# Patient Record
Sex: FEMALE | Race: WHITE | NOT HISPANIC OR LATINO | Employment: FULL TIME | URBAN - METROPOLITAN AREA
[De-identification: names, ages, dates, MRNs, and addresses within clinical notes are randomized per-mention and may not be internally consistent; named-entity substitution may affect disease eponyms.]

---

## 2017-01-17 ENCOUNTER — ALLSCRIPTS OFFICE VISIT (OUTPATIENT)
Dept: OTHER | Facility: OTHER | Age: 47
End: 2017-01-17

## 2017-01-17 ENCOUNTER — GENERIC CONVERSION - ENCOUNTER (OUTPATIENT)
Dept: OTHER | Facility: OTHER | Age: 47
End: 2017-01-17

## 2017-08-12 ENCOUNTER — ALLSCRIPTS OFFICE VISIT (OUTPATIENT)
Dept: OTHER | Facility: OTHER | Age: 47
End: 2017-08-12

## 2018-01-13 VITALS
DIASTOLIC BLOOD PRESSURE: 70 MMHG | HEIGHT: 66 IN | BODY MASS INDEX: 30.53 KG/M2 | SYSTOLIC BLOOD PRESSURE: 122 MMHG | HEART RATE: 88 BPM | WEIGHT: 190 LBS | OXYGEN SATURATION: 97 %

## 2018-01-13 VITALS
HEIGHT: 66 IN | BODY MASS INDEX: 30.86 KG/M2 | WEIGHT: 192 LBS | TEMPERATURE: 97.9 F | DIASTOLIC BLOOD PRESSURE: 60 MMHG | HEART RATE: 80 BPM | RESPIRATION RATE: 18 BRPM | SYSTOLIC BLOOD PRESSURE: 100 MMHG

## 2018-03-08 ENCOUNTER — OFFICE VISIT (OUTPATIENT)
Dept: FAMILY MEDICINE CLINIC | Facility: CLINIC | Age: 48
End: 2018-03-08
Payer: COMMERCIAL

## 2018-03-08 VITALS
SYSTOLIC BLOOD PRESSURE: 110 MMHG | HEART RATE: 90 BPM | RESPIRATION RATE: 16 BRPM | WEIGHT: 201 LBS | TEMPERATURE: 98.2 F | BODY MASS INDEX: 32.3 KG/M2 | OXYGEN SATURATION: 96 % | DIASTOLIC BLOOD PRESSURE: 60 MMHG | HEIGHT: 66 IN

## 2018-03-08 DIAGNOSIS — F41.9 ANXIETY: ICD-10-CM

## 2018-03-08 DIAGNOSIS — B37.3 CANDIDA VAGINITIS: ICD-10-CM

## 2018-03-08 DIAGNOSIS — J06.9 UPPER RESPIRATORY TRACT INFECTION, UNSPECIFIED TYPE: ICD-10-CM

## 2018-03-08 DIAGNOSIS — R05.3 COUGH, PERSISTENT: Primary | ICD-10-CM

## 2018-03-08 DIAGNOSIS — E66.09 CLASS 1 OBESITY DUE TO EXCESS CALORIES WITHOUT SERIOUS COMORBIDITY WITH BODY MASS INDEX (BMI) OF 32.0 TO 32.9 IN ADULT: ICD-10-CM

## 2018-03-08 PROCEDURE — 3008F BODY MASS INDEX DOCD: CPT | Performed by: FAMILY MEDICINE

## 2018-03-08 PROCEDURE — 99214 OFFICE O/P EST MOD 30 MIN: CPT | Performed by: FAMILY MEDICINE

## 2018-03-08 RX ORDER — BENZONATATE 100 MG/1
CAPSULE ORAL
COMMUNITY
Start: 2018-03-05 | End: 2018-03-08 | Stop reason: SDUPTHER

## 2018-03-08 RX ORDER — PHENTERMINE HYDROCHLORIDE 37.5 MG/1
37.5 CAPSULE ORAL EVERY MORNING
Qty: 30 CAPSULE | Refills: 2 | Status: SHIPPED | OUTPATIENT
Start: 2018-03-08 | End: 2020-12-08 | Stop reason: SDUPTHER

## 2018-03-08 RX ORDER — BENZONATATE 200 MG/1
200 CAPSULE ORAL 3 TIMES DAILY PRN
Qty: 45 CAPSULE | Refills: 0 | Status: SHIPPED | OUTPATIENT
Start: 2018-03-08 | End: 2019-02-05 | Stop reason: SDUPTHER

## 2018-03-08 RX ORDER — AMOXICILLIN AND CLAVULANATE POTASSIUM 875; 125 MG/1; MG/1
TABLET, FILM COATED ORAL
COMMUNITY
Start: 2018-03-05 | End: 2018-11-14 | Stop reason: SDUPTHER

## 2018-03-08 RX ORDER — ALPRAZOLAM 0.5 MG/1
0.5 TABLET ORAL 3 TIMES DAILY PRN
Qty: 30 TABLET | Refills: 0 | Status: SHIPPED | OUTPATIENT
Start: 2018-03-08 | End: 2019-02-05 | Stop reason: SDUPTHER

## 2018-03-08 RX ORDER — ALPRAZOLAM 0.5 MG/1
1 TABLET ORAL EVERY 6 HOURS
COMMUNITY
Start: 2016-02-04 | End: 2018-03-08 | Stop reason: SDUPTHER

## 2018-03-08 RX ORDER — FLUCONAZOLE 150 MG/1
TABLET ORAL
Qty: 2 TABLET | Refills: 0 | Status: SHIPPED | OUTPATIENT
Start: 2018-03-08 | End: 2018-03-15

## 2018-03-08 NOTE — PROGRESS NOTES
[address]        NAME: Scottie Sandhu is a 41 y o  female  : 1970    MRN: [id number]  DATE: 2018  TIME: 4:57 PM    Assessment and Plan   Cough, persistent [R05]  1  Cough, persistent  HYDROcodone-homatropine (HYDROMET) 5-1 5 mg/5 mL syrup    benzonatate (TESSALON) 200 MG capsule   2  Upper respiratory tract infection, unspecified type     3  Anxiety  ALPRAZolam (XANAX) 0 5 mg tablet   4  Class 1 obesity due to excess calories without serious comorbidity with body mass index (BMI) of 32 0 to 32 9 in adult  phentermine 37 5 MG capsule   5  Candida vaginitis  fluconazole (DIFLUCAN) 150 mg tablet         Patient Instructions     Patient Instructions     Ian Persaud out her Augmentin  Use the Tessalon during the day for the cough in use the hydrocodone cough syrup at night  Refill on Xanax to be use 2-3 times per day maximum as needed  Diflucan once weekly for vaginal yeast infection secondary to antibiotic  Trial of phentermine for weight loss via appetite suppressant  We should check it back about 2 months in to starting this  Chief Complaint     Chief Complaint   Patient presents with    Cough     post nasal drip, wants rx for cough medicine    History of Present Illness       Patient has been know of last 4-5 days  She called the [facility] and received a prescription for Augmentin  She had not had any fevers or chills  She has minimal sinus pressure with some postnasal drip  There been no night sweats or documented fevers  Cough currently has a very dry cough  It does disturb her sleep  Pt does note starting with vaginal itching since on Augmentin  Anxiety- uses Xanax prn- 1-3/week   Patient also interested in trying phentermine to help with appetite so she may lose some weight  Current BMI 32  Cough   Associated symptoms include rhinorrhea   Pertinent negatives include no chest pain, chills, ear pain, eye redness, fever, sore throat, shortness of breath or wheezing  Review of Systems   Review of Systems   Constitutional: Negative for appetite change, chills, diaphoresis and fever  HENT: Positive for rhinorrhea  Negative for ear pain, sinus pressure and sore throat  Eyes: Negative for discharge, redness and itching  Respiratory: Positive for cough  Negative for shortness of breath and wheezing  Cardiovascular: Negative for chest pain and palpitations  Rapid or slow heart rate   Gastrointestinal: Negative for abdominal pain, diarrhea, nausea and vomiting  Genitourinary: Positive for vaginal discharge  Negative for difficulty urinating and dysuria  Psychiatric/Behavioral: Positive for dysphoric mood and sleep disturbance  Negative for decreased concentration  The patient is nervous/anxious            Current Medications       Current Outpatient Prescriptions:     ALPRAZolam (XANAX) 0 5 mg tablet, Take 1 tablet (0 5 mg total) by mouth 3 (three) times a day as needed for anxiety, Disp: 30 tablet, Rfl: 0    HYDROcodone-homatropine (HYDROMET) 5-1 5 mg/5 mL syrup, Take 5 mL by mouth every 4 (four) hours as needed for cough Max Daily Amount: 30 mL, Disp: 473 mL, Rfl: 0    amoxicillin-clavulanate (AUGMENTIN) 875-125 mg per tablet, , Disp: , Rfl:     benzonatate (TESSALON) 200 MG capsule, Take 1 capsule (200 mg total) by mouth 3 (three) times a day as needed for cough, Disp: 45 capsule, Rfl: 0    fluconazole (DIFLUCAN) 150 mg tablet, 1 tab for vaginal itching, repeat 1 week if needed, Disp: 2 tablet, Rfl: 0    phentermine 37 5 MG capsule, Take 1 capsule (37 5 mg total) by mouth every morning, Disp: 30 capsule, Rfl: 2    Current Allergies     Allergies as of 03/08/2018 - Reviewed 03/08/2018   Allergen Reaction Noted    Doxycycline GI Intolerance and Vomiting 01/17/2017    Latex  05/09/2016            The following portions of the patient's history were reviewed and updated as appropriate: allergies, current medications, past family history, past medical history, past social history, past surgical history and problem list      No past medical history on file  No past surgical history on file  Family History   Problem Relation Age of Onset    Hypertension Father     Breast cancer Paternal Grandmother     Diabetes Paternal Grandmother     Substance Abuse Neg Hx     Mental illness Neg Hx          Medications have been verified  Objective   /60 (BP Location: Right arm, Patient Position: Sitting, Cuff Size: Large)   Pulse 90   Temp 98 2 °F (36 8 °C) (Oral)   Resp 16   Ht 5' 5 5" (1 664 m)   Wt 91 2 kg (201 lb)   SpO2 96%   BMI 32 94 kg/m²        Physical Exam     Physical Exam   Constitutional: She appears well-developed and well-nourished  No distress  HENT:   Head: Normocephalic and atraumatic  Right Ear: Tympanic membrane and external ear normal  No drainage  Left Ear: Tympanic membrane normal  There is drainage  Mouth/Throat: No oropharyngeal exudate  Eyes: Conjunctivae and EOM are normal  Right eye exhibits no discharge  Left eye exhibits no discharge  Neck: Normal range of motion  Neck supple  No thyromegaly present  Cardiovascular: Normal rate, regular rhythm and normal heart sounds  Pulmonary/Chest: Effort normal  No respiratory distress  She has no wheezes  She has no rales  Abdominal: Soft  She exhibits no mass  There is no tenderness  Lymphadenopathy:     She has no cervical adenopathy  Skin: Skin is warm and dry  Psychiatric: She has a normal mood and affect   Her behavior is normal

## 2018-03-08 NOTE — PATIENT INSTRUCTIONS
Finish out her Augmentin  Use the Tessalon during the day for the cough in use the hydrocodone cough syrup at night  Refill on Xanax to be use 2-3 times per day maximum as needed  Diflucan once weekly for vaginal yeast infection secondary to antibiotic  Trial of phentermine for weight loss via appetite suppressant  We should check it back about 2 months in to starting this

## 2018-04-02 ENCOUNTER — TELEPHONE (OUTPATIENT)
Dept: FAMILY MEDICINE CLINIC | Facility: CLINIC | Age: 48
End: 2018-04-02

## 2018-04-02 DIAGNOSIS — R05.3 COUGH, PERSISTENT: ICD-10-CM

## 2018-11-14 ENCOUNTER — OFFICE VISIT (OUTPATIENT)
Dept: FAMILY MEDICINE CLINIC | Facility: CLINIC | Age: 48
End: 2018-11-14
Payer: COMMERCIAL

## 2018-11-14 ENCOUNTER — TELEPHONE (OUTPATIENT)
Dept: FAMILY MEDICINE CLINIC | Facility: CLINIC | Age: 48
End: 2018-11-14

## 2018-11-14 VITALS
DIASTOLIC BLOOD PRESSURE: 76 MMHG | HEIGHT: 66 IN | WEIGHT: 187 LBS | BODY MASS INDEX: 30.05 KG/M2 | SYSTOLIC BLOOD PRESSURE: 118 MMHG | HEART RATE: 85 BPM | OXYGEN SATURATION: 98 %

## 2018-11-14 DIAGNOSIS — H66.90 ACUTE OTITIS MEDIA, UNSPECIFIED OTITIS MEDIA TYPE: Primary | ICD-10-CM

## 2018-11-14 PROCEDURE — 99213 OFFICE O/P EST LOW 20 MIN: CPT | Performed by: FAMILY MEDICINE

## 2018-11-14 PROCEDURE — 3008F BODY MASS INDEX DOCD: CPT | Performed by: FAMILY MEDICINE

## 2018-11-14 PROCEDURE — 1036F TOBACCO NON-USER: CPT | Performed by: FAMILY MEDICINE

## 2018-11-14 RX ORDER — AMOXICILLIN AND CLAVULANATE POTASSIUM 875; 125 MG/1; MG/1
1 TABLET, FILM COATED ORAL EVERY 12 HOURS SCHEDULED
Qty: 20 TABLET | Refills: 0 | Status: SHIPPED | OUTPATIENT
Start: 2018-11-14 | End: 2018-11-24

## 2018-11-14 NOTE — PROGRESS NOTES
[facility]        NAME: Trevor Lam is a 57 y o  female  : 1970    MRN: [id number]  DATE: 2018  TIME: 2:44 PM    Assessment and Plan   Acute otitis media, unspecified otitis media type [H66 90]  1  Acute otitis media, unspecified otitis media type  amoxicillin-clavulanate (AUGMENTIN) 875-125 mg per tablet         Patient Instructions     Patient Instructions   See meds---flush R ear if Sx cont          Chief Complaint     Chief Complaint   Patient presents with    Earache     sorethroat          History of Present Illness       R ear pain after flying      Earache    Pertinent negatives include no abdominal pain, coughing, diarrhea, rhinorrhea, sore throat or vomiting  Review of Systems   Review of Systems   Constitutional: Negative for appetite change, chills, diaphoresis and fever  HENT: Positive for ear pain  Negative for rhinorrhea, sinus pressure and sore throat  Eyes: Negative for discharge, redness and itching  Respiratory: Negative for cough, shortness of breath and wheezing  Cardiovascular: Negative for chest pain and palpitations  Gastrointestinal: Negative for abdominal pain, diarrhea, nausea and vomiting           Current Medications       Current Outpatient Prescriptions:     ALPRAZolam (XANAX) 0 5 mg tablet, Take 1 tablet (0 5 mg total) by mouth 3 (three) times a day as needed for anxiety, Disp: 30 tablet, Rfl: 0    amoxicillin-clavulanate (AUGMENTIN) 875-125 mg per tablet, Take 1 tablet by mouth every 12 (twelve) hours for 10 days, Disp: 20 tablet, Rfl: 0    benzonatate (TESSALON) 200 MG capsule, Take 1 capsule (200 mg total) by mouth 3 (three) times a day as needed for cough (Patient not taking: Reported on 2018 ), Disp: 45 capsule, Rfl: 0    HYDROcodone-homatropine (HYDROMET) 5-1 5 mg/5 mL syrup, Take 5 mL by mouth every 4 (four) hours as needed for cough Max Daily Amount: 30 mL (Patient not taking: Reported on 2018 ), Disp: 473 mL, Rfl: 0    phentermine 37 5 MG capsule, Take 1 capsule (37 5 mg total) by mouth every morning (Patient not taking: Reported on 11/14/2018 ), Disp: 30 capsule, Rfl: 2    Current Allergies     Allergies as of 11/14/2018 - Reviewed 11/14/2018   Allergen Reaction Noted    Doxycycline GI Intolerance and Vomiting 01/17/2017    Latex  05/09/2016            The following portions of the patient's history were reviewed and updated as appropriate: allergies, current medications, past family history, past medical history, past social history, past surgical history and problem list      History reviewed  No pertinent past medical history  History reviewed  No pertinent surgical history  Family History   Problem Relation Age of Onset    Hypertension Father     Breast cancer Paternal Grandmother     Diabetes Paternal Grandmother     Substance Abuse Neg Hx     Mental illness Neg Hx          Medications have been verified  Objective   /76   Pulse 85   Ht 5' 5 5" (1 664 m)   Wt 84 8 kg (187 lb)   SpO2 98%   BMI 30 65 kg/m²        Physical Exam     Physical Exam   Constitutional: She appears well-developed and well-nourished  No distress  HENT:   Right Ear: Tympanic membrane and ear canal normal  Tympanic membrane is not injected  Left Ear: Tympanic membrane and ear canal normal  Tympanic membrane is not injected  Nose: Nose normal    Mouth/Throat: Oropharynx is clear and moist and mucous membranes are normal    R ear full of cerumen   Eyes: Pupils are equal, round, and reactive to light  Conjunctivae and EOM are normal  Right eye exhibits no discharge  Left eye exhibits no discharge  Neck: Normal range of motion  Neck supple  No thyromegaly present  Cardiovascular: Normal rate, regular rhythm and normal heart sounds  No murmur heard  Pulmonary/Chest: Effort normal and breath sounds normal  No respiratory distress  She has no wheezes     Lymphadenopathy:     She has no cervical adenopathy  Skin: She is not diaphoretic  Nursing note and vitals reviewed

## 2018-11-14 NOTE — TELEPHONE ENCOUNTER
Sore throat, ear pain crackling and clogged, - just got back from [facility] her an appointment for tomorrow but pt wants to be seen today since she is off work today    Do you want to see patient today or should I send to urgent care    [phone]

## 2018-11-19 DIAGNOSIS — B37.9 YEAST INFECTION: Primary | ICD-10-CM

## 2018-11-19 RX ORDER — FLUCONAZOLE 150 MG/1
TABLET ORAL
Qty: 1 TABLET | Refills: 1 | Status: SHIPPED | OUTPATIENT
Start: 2018-11-19 | End: 2018-11-19

## 2018-11-28 DIAGNOSIS — R05.3 COUGH, PERSISTENT: ICD-10-CM

## 2018-11-28 NOTE — TELEPHONE ENCOUNTER
Spoke to patient, states she saw Dr Nico Saab 2 weeks ago ear ache, sore throat, cough    Was given antibiotic Rx and now cough has worsened  Would like same cough syrup that she had back in the spring from Dr Blade Hewitt      Rx written and sent to Dr Nico Saab to sign

## 2019-02-05 ENCOUNTER — TELEPHONE (OUTPATIENT)
Dept: FAMILY MEDICINE CLINIC | Facility: CLINIC | Age: 49
End: 2019-02-05

## 2019-02-05 ENCOUNTER — OFFICE VISIT (OUTPATIENT)
Dept: FAMILY MEDICINE CLINIC | Facility: CLINIC | Age: 49
End: 2019-02-05
Payer: COMMERCIAL

## 2019-02-05 VITALS
TEMPERATURE: 98.3 F | BODY MASS INDEX: 29.25 KG/M2 | HEIGHT: 66 IN | OXYGEN SATURATION: 98 % | DIASTOLIC BLOOD PRESSURE: 70 MMHG | WEIGHT: 182 LBS | HEART RATE: 88 BPM | RESPIRATION RATE: 16 BRPM | SYSTOLIC BLOOD PRESSURE: 100 MMHG

## 2019-02-05 DIAGNOSIS — F41.9 ANXIETY: ICD-10-CM

## 2019-02-05 DIAGNOSIS — G89.29 CHRONIC BILATERAL LOW BACK PAIN WITHOUT SCIATICA: ICD-10-CM

## 2019-02-05 DIAGNOSIS — R05.3 COUGH, PERSISTENT: ICD-10-CM

## 2019-02-05 DIAGNOSIS — R05.8 POST-VIRAL COUGH SYNDROME: ICD-10-CM

## 2019-02-05 DIAGNOSIS — M54.50 CHRONIC BILATERAL LOW BACK PAIN WITHOUT SCIATICA: ICD-10-CM

## 2019-02-05 DIAGNOSIS — E66.3 OVERWEIGHT (BMI 25.0-29.9): ICD-10-CM

## 2019-02-05 DIAGNOSIS — Z23 NEED FOR VACCINATION: Primary | ICD-10-CM

## 2019-02-05 PROCEDURE — 1036F TOBACCO NON-USER: CPT | Performed by: FAMILY MEDICINE

## 2019-02-05 PROCEDURE — 99214 OFFICE O/P EST MOD 30 MIN: CPT | Performed by: FAMILY MEDICINE

## 2019-02-05 PROCEDURE — 3008F BODY MASS INDEX DOCD: CPT | Performed by: FAMILY MEDICINE

## 2019-02-05 RX ORDER — METAXALONE 800 MG/1
800 TABLET ORAL 3 TIMES DAILY
Qty: 60 TABLET | Refills: 2 | Status: SHIPPED | OUTPATIENT
Start: 2019-02-05 | End: 2020-12-08 | Stop reason: SDUPTHER

## 2019-02-05 RX ORDER — ALPRAZOLAM 0.5 MG/1
0.5 TABLET ORAL 3 TIMES DAILY PRN
Qty: 30 TABLET | Refills: 2 | Status: SHIPPED | OUTPATIENT
Start: 2019-02-05 | End: 2020-02-24 | Stop reason: SDUPTHER

## 2019-02-05 RX ORDER — ALBUTEROL SULFATE 90 UG/1
2 AEROSOL, METERED RESPIRATORY (INHALATION) EVERY 6 HOURS PRN
Qty: 8.5 G | Refills: 1 | Status: SHIPPED | OUTPATIENT
Start: 2019-02-05 | End: 2022-08-03

## 2019-02-05 RX ORDER — PREDNISONE 20 MG/1
TABLET ORAL
Qty: 15 TABLET | Refills: 0 | Status: SHIPPED | OUTPATIENT
Start: 2019-02-05 | End: 2022-08-03

## 2019-02-05 RX ORDER — BENZONATATE 200 MG/1
200 CAPSULE ORAL 3 TIMES DAILY PRN
Qty: 45 CAPSULE | Refills: 0 | Status: SHIPPED | OUTPATIENT
Start: 2019-02-05 | End: 2020-02-24 | Stop reason: SDUPTHER

## 2019-02-05 NOTE — TELEPHONE ENCOUNTER
Patients states has had this cough since November, was seen 11/14/2018 for ear problems was given abx, then called 2weeks later with cough was given cough med--pt still c/o of this cough  Wants to know if she can get more cough meds?

## 2019-02-05 NOTE — PATIENT INSTRUCTIONS
Trial of short course of prednisone to help with the cough  Will refill the cough syrup  If this does not correct the problem I would advise a chest x-ray and a trial of a bronchodilator inhaler  Refill other medications for low back pain on an intermittent basis  Also for the anxiety with as needed Xanax usually only 1-2 times per week  Weight Management   AMBULATORY CARE:   Why it is important to manage your weight:  Being overweight increases your risk of health conditions such as heart disease, high blood pressure, type 2 diabetes, and certain types of cancer  It can also increase your risk for osteoarthritis, sleep apnea, and other respiratory problems  Aim for a slow, steady weight loss  Even a small amount of weight loss can lower your risk of health problems  How to lose weight safely:  A safe and healthy way to lose weight is to eat fewer calories and get regular exercise  You can lose up about 1 pound a week by decreasing the number of calories you eat by 500 calories each day  You can decrease calories by eating smaller portion sizes or by cutting out high-calorie foods  Read labels to find out how many calories are in the foods you eat  You can also burn calories with exercise such as walking, swimming, or biking  You will be more likely to keep weight off if you make these changes part of your lifestyle  Healthy meal plan for weight management:  A healthy meal plan includes a variety of foods, contains fewer calories, and helps you stay healthy  A healthy meal plan includes the following:  · Eat whole-grain foods more often  A healthy meal plan should contain fiber  Fiber is the part of grains, fruits, and vegetables that is not broken down by your body  Whole-grain foods are healthy and provide extra fiber in your diet  Some examples of whole-grain foods are whole-wheat breads and pastas, oatmeal, brown rice, and bulgur  · Eat a variety of vegetables every day    Include dark, leafy greens such as spinach, kale, theron greens, and mustard greens  Eat yellow and orange vegetables such as carrots, sweet potatoes, and winter squash  · Eat a variety of fruits every day  Choose fresh or canned fruit (canned in its own juice or light syrup) instead of juice  Fruit juice has very little or no fiber  · Eat low-fat dairy foods  Drink fat-free (skim) milk or 1% milk  Eat fat-free yogurt and low-fat cottage cheese  Try low-fat cheeses such as mozzarella and other reduced-fat cheeses  · Choose meat and other protein foods that are low in fat  Choose beans or other legumes such as split peas or lentils  Choose fish, skinless poultry (chicken or turkey), or lean cuts of red meat (beef or pork)  Before you cook meat or poultry, cut off any visible fat  · Use less fat and oil  Try baking foods instead of frying them  Add less fat, such as margarine, sour cream, regular salad dressing and mayonnaise to foods  Eat fewer high-fat foods  Some examples of high-fat foods include french fries, doughnuts, ice cream, and cakes  · Eat fewer sweets  Limit foods and drinks that are high in sugar  This includes candy, cookies, regular soda, and sweetened drinks  Ways to decrease calories:   · Eat smaller portions  ¨ Use a small plate with smaller servings  ¨ Do not eat second helpings  ¨ When you eat at a restaurant, ask for a box and place half of your meal in the box before you eat  ¨ Share an entrée with someone else  · Replace high-calorie snacks with healthy, low-calorie snacks  ¨ Choose fresh fruit, vegetables, fat-free rice cakes, or air-popped popcorn instead of potato chips, nuts, or chocolate  ¨ Choose water or calorie-free drinks instead of soda or sweetened drinks  · Eat regular meals  Skipping meals can lead to overeating later in the day   Eat a healthy snack in place of a meal if you do not have time to eat a regular meal      · Do not shop for groceries when you are hungry  You may be more likely to make unhealthy food choices  Take a grocery list of healthy foods and shop after you have eaten  Exercise:  Exercise at least 30 minutes per day on most days of the week  Some examples of exercise include walking, biking, dancing, and swimming  You can also fit in more physical activity by taking the stairs instead of the elevator or parking farther away from stores  Ask your healthcare provider about the best exercise plan for you  Other things to consider as you try to lose weight:   · Be aware of situations that may give you the urge to overeat, such as eating while watching television  Find ways to avoid these situations  For example, read a book, go for a walk, or do crafts  · Meet with a weight loss support group or friends who are also trying to lose weight  This may help you stay motivated to continue working on your weight loss goals  © 2017 [employer] [employer] Information is for End User's use only and may not be sold, redistributed or otherwise used for commercial purposes  All illustrations and images included in CareNotes® are the copyrighted property of [employer] , [employer]  or [employer]  The above information is an  only  It is not intended as medical advice for individual conditions or treatments  Talk to your doctor, nurse or pharmacist before following any medical regimen to see if it is safe and effective for you  Heart Healthy Diet   AMBULATORY CARE:   A heart healthy diet  is an eating plan low in total fat, unhealthy fats, and sodium (salt)  A heart healthy diet helps decrease your risk for heart disease and stroke  Limit the amount of fat you eat to 25% to 35% of your total daily calories  Limit sodium to less than 2,300 mg each day  Healthy fats:  Healthy fats can help improve cholesterol levels   The risk for heart disease is decreased when cholesterol levels are normal  Choose healthy fats, such as the following:  · Unsaturated fat  is found in foods such as soybean, canola, olive, corn, and safflower oils  It is also found in soft tub margarine that is made with liquid vegetable oil  · Omega-3 fat  is found in certain fish, such as salmon, tuna, and trout, and in walnuts and flaxseed  Unhealthy fats:  Unhealthy fats can cause unhealthy cholesterol levels in your blood and increase your risk of heart disease  Limit unhealthy fats, such as the following:  · Cholesterol  is found in animal foods, such as eggs and lobster, and in dairy products made from whole milk  Limit cholesterol to less than 300 milligrams (mg) each day  You may need to limit cholesterol to 200 mg each day if you have heart disease  · Saturated fat  is found in meats, such as whitley and hamburger  It is also found in chicken or turkey skin, whole milk, and butter  Limit saturated fat to less than 7% of your total daily calories  Limit saturated fat to less than 6% if you have heart disease or are at increased risk for it  · Trans fat  is found in packaged foods, such as potato chips and cookies  It is also in hard margarine, some fried foods, and shortening  Avoid trans fats as much as possible    Heart healthy foods and drinks to include:  Ask your dietitian or healthcare provider how many servings to have from each of the following food groups:  · Grains:      ¨ Whole-wheat breads, cereals, and pastas, and brown rice    ¨ Low-fat, low-sodium crackers and chips    · Vegetables:      ¨ Broccoli, green beans, green peas, and spinach    ¨ Collards, kale, and lima beans    ¨ Carrots, sweet potatoes, tomatoes, and peppers    ¨ Canned vegetables with no salt added    · Fruits:      ¨ Bananas, peaches, pears, and pineapple    ¨ Grapes, raisins, and dates    ¨ Oranges, tangerines, grapefruit, orange juice, and grapefruit juice    ¨ Apricots, mangoes, melons, and papaya    ¨ Raspberries and strawberries    ¨ Canned fruit with no added sugar    · Low-fat dairy products:      ¨ Nonfat (skim) milk, 1% milk, and low-fat almond, cashew, or soy milks fortified with calcium    ¨ Low-fat cheese, regular or frozen yogurt, and cottage cheese    · Meats and proteins , such as lean cuts of beef and pork (loin, leg, round), skinless chicken and turkey, legumes, soy products, egg whites, and nuts  Foods and drinks to limit or avoid:  Ask your dietitian or healthcare provider about these and other foods that are high in unhealthy fat, sodium, and sugar:  · Snack or packaged foods , such as frozen dinners, cookies, macaroni and cheese, and cereals with more than 300 mg of sodium per serving    · Canned or dry mixes  for cakes, soups, sauces, or gravies    · Vegetables with added sodium , such as instant potatoes, vegetables with added sauces, or regular canned vegetables    · Other foods high in sodium , such as ketchup, barbecue sauce, salad dressing, pickles, olives, soy sauce, and miso    · High-fat dairy foods  such as whole or 2% milk, cream cheese, or sour cream, and cheeses     · High-fat protein foods  such as high-fat cuts of beef (T-bone steaks, ribs), chicken or turkey with skin, and organ meats, such as liver    · Cured or smoked meats , such as hot dogs, whitley, and sausage    · Unhealthy fats and oils , such as butter, stick margarine, shortening, and cooking oils such as coconut or palm oil    · Food and drinks high in sugar , such as soft drinks (soda), sports drinks, sweetened tea, candy, cake, cookies, pies, and doughnuts  Other diet guidelines to follow:   · Eat more foods containing omega-3 fats  Eat fish high in omega-3 fats at least 2 times a week  · Limit alcohol  Too much alcohol can damage your heart and raise your blood pressure  Women should limit alcohol to 1 drink a day  Men should limit alcohol to 2 drinks a day  A drink of alcohol is 12 ounces of beer, 5 ounces of wine, or 1½ ounces of liquor  · Choose low-sodium foods  High-sodium foods can lead to high blood pressure  Add little or no salt to food you prepare  Use herbs and spices in place of salt  · Eat more fiber  to help lower cholesterol levels  Eat at least 5 servings of fruits and vegetables each day  Eat 3 ounces of whole-grain foods each day  Legumes (beans) are also a good source of fiber  Lifestyle guidelines:   · Do not smoke  Nicotine and other chemicals in cigarettes and cigars can cause lung and heart damage  Ask your healthcare provider for information if you currently smoke and need help to quit  E-cigarettes or smokeless tobacco still contain nicotine  Talk to your healthcare provider before you use these products  · Exercise regularly  to help you maintain a healthy weight and improve your blood pressure and cholesterol levels  Ask your healthcare provider about the best exercise plan for you  Do not start an exercise program without asking your healthcare provider  Follow up with your healthcare provider as directed:  Write down your questions so you remember to ask them during your visits  © 2017 [address] [facility] Information is for End User's use only and may not be sold, redistributed or otherwise used for commercial purposes  All illustrations and images included in CareNotes® are the copyrighted property of [employer] , [employer]  or [employer]  The above information is an  only  It is not intended as medical advice for individual conditions or treatments  Talk to your doctor, nurse or pharmacist before following any medical regimen to see if it is safe and effective for you

## 2019-02-05 NOTE — PROGRESS NOTES
[id number]         NAME: Larissa Stewart is a 57 y o  female  : 1970    MRN: [id number]  DATE: 2019  TIME: 5:35 PM    Assessment and Plan   Need for vaccination [Z23]  1  Need for vaccination  PREFERRED: influenza vaccine, 8627-2329, quadrivalent, recombinant, PF, 0 5 mL (FLUBLOK)   2  Post-viral cough syndrome  predniSONE 20 mg tablet    XR chest pa & lateral    albuterol (PROAIR HFA) 90 mcg/act inhaler   3  Anxiety  ALPRAZolam (XANAX) 0 5 mg tablet   4  Chronic bilateral low back pain without sciatica  metaxalone (SKELAXIN) 800 mg tablet   5  Overweight (BMI 25 0-29 9)     6  Cough, persistent  benzonatate (TESSALON) 200 MG capsule    HYDROcodone-homatropine (HYDROMET) 5-1 5 mg/5 mL syrup       No problem-specific Assessment & Plan notes found for this encounter  Patient Instructions     Patient Instructions   Trial of short course of prednisone to help with the cough  Will refill the cough syrup  If this does not correct the problem I would advise a chest x-ray and a trial of a bronchodilator inhaler  Refill other medications for low back pain on an intermittent basis  Also for the anxiety with as needed Xanax usually only 1-2 times per week  Weight Management   AMBULATORY CARE:   Why it is important to manage your weight:  Being overweight increases your risk of health conditions such as heart disease, high blood pressure, type 2 diabetes, and certain types of cancer  It can also increase your risk for osteoarthritis, sleep apnea, and other respiratory problems  Aim for a slow, steady weight loss  Even a small amount of weight loss can lower your risk of health problems  How to lose weight safely:  A safe and healthy way to lose weight is to eat fewer calories and get regular exercise  You can lose up about 1 pound a week by decreasing the number of calories you eat by 500 calories each day   You can decrease calories by eating smaller portion sizes or by cutting out high-calorie foods  Read labels to find out how many calories are in the foods you eat  You can also burn calories with exercise such as walking, swimming, or biking  You will be more likely to keep weight off if you make these changes part of your lifestyle  Healthy meal plan for weight management:  A healthy meal plan includes a variety of foods, contains fewer calories, and helps you stay healthy  A healthy meal plan includes the following:  · Eat whole-grain foods more often  A healthy meal plan should contain fiber  Fiber is the part of grains, fruits, and vegetables that is not broken down by your body  Whole-grain foods are healthy and provide extra fiber in your diet  Some examples of whole-grain foods are whole-wheat breads and pastas, oatmeal, brown rice, and bulgur  · Eat a variety of vegetables every day  Include dark, leafy greens such as spinach, kale, theron greens, and mustard greens  Eat yellow and orange vegetables such as carrots, sweet potatoes, and winter squash  · Eat a variety of fruits every day  Choose fresh or canned fruit (canned in its own juice or light syrup) instead of juice  Fruit juice has very little or no fiber  · Eat low-fat dairy foods  Drink fat-free (skim) milk or 1% milk  Eat fat-free yogurt and low-fat cottage cheese  Try low-fat cheeses such as mozzarella and other reduced-fat cheeses  · Choose meat and other protein foods that are low in fat  Choose beans or other legumes such as split peas or lentils  Choose fish, skinless poultry (chicken or turkey), or lean cuts of red meat (beef or pork)  Before you cook meat or poultry, cut off any visible fat  · Use less fat and oil  Try baking foods instead of frying them  Add less fat, such as margarine, sour cream, regular salad dressing and mayonnaise to foods  Eat fewer high-fat foods  Some examples of high-fat foods include french fries, doughnuts, ice cream, and cakes      · Eat fewer sweets  Limit foods and drinks that are high in sugar  This includes candy, cookies, regular soda, and sweetened drinks  Ways to decrease calories:   · Eat smaller portions  ¨ Use a small plate with smaller servings  ¨ Do not eat second helpings  ¨ When you eat at a restaurant, ask for a box and place half of your meal in the box before you eat  ¨ Share an entrée with someone else  · Replace high-calorie snacks with healthy, low-calorie snacks  ¨ Choose fresh fruit, vegetables, fat-free rice cakes, or air-popped popcorn instead of potato chips, nuts, or chocolate  ¨ Choose water or calorie-free drinks instead of soda or sweetened drinks  · Eat regular meals  Skipping meals can lead to overeating later in the day  Eat a healthy snack in place of a meal if you do not have time to eat a regular meal      · Do not shop for groceries when you are hungry  You may be more likely to make unhealthy food choices  Take a grocery list of healthy foods and shop after you have eaten  Exercise:  Exercise at least 30 minutes per day on most days of the week  Some examples of exercise include walking, biking, dancing, and swimming  You can also fit in more physical activity by taking the stairs instead of the elevator or parking farther away from stores  Ask your healthcare provider about the best exercise plan for you  Other things to consider as you try to lose weight:   · Be aware of situations that may give you the urge to overeat, such as eating while watching television  Find ways to avoid these situations  For example, read a book, go for a walk, or do crafts  · Meet with a weight loss support group or friends who are also trying to lose weight  This may help you stay motivated to continue working on your weight loss goals  © 2017 [employer] Information is for End User's use only and may not be sold, redistributed or otherwise used for commercial purposes   All illustrations and images included in CareNotes® are the copyrighted property of [employer]  or [employer]  The above information is an  only  It is not intended as medical advice for individual conditions or treatments  Talk to your doctor, nurse or pharmacist before following any medical regimen to see if it is safe and effective for you  Heart Healthy Diet   AMBULATORY CARE:   A heart healthy diet  is an eating plan low in total fat, unhealthy fats, and sodium (salt)  A heart healthy diet helps decrease your risk for heart disease and stroke  Limit the amount of fat you eat to 25% to 35% of your total daily calories  Limit sodium to less than 2,300 mg each day  Healthy fats:  Healthy fats can help improve cholesterol levels  The risk for heart disease is decreased when cholesterol levels are normal  Choose healthy fats, such as the following:  · Unsaturated fat  is found in foods such as soybean, canola, olive, corn, and safflower oils  It is also found in soft tub margarine that is made with liquid vegetable oil  · Omega-3 fat  is found in certain fish, such as salmon, tuna, and trout, and in walnuts and flaxseed  Unhealthy fats:  Unhealthy fats can cause unhealthy cholesterol levels in your blood and increase your risk of heart disease  Limit unhealthy fats, such as the following:  · Cholesterol  is found in animal foods, such as eggs and lobster, and in dairy products made from whole milk  Limit cholesterol to less than 300 milligrams (mg) each day  You may need to limit cholesterol to 200 mg each day if you have heart disease  · Saturated fat  is found in meats, such as whitley and hamburger  It is also found in chicken or turkey skin, whole milk, and butter  Limit saturated fat to less than 7% of your total daily calories  Limit saturated fat to less than 6% if you have heart disease or are at increased risk for it       · Trans fat  is found in packaged foods, such as potato chips and cookies  It is also in hard margarine, some fried foods, and shortening  Avoid trans fats as much as possible    Heart healthy foods and drinks to include:  Ask your dietitian or healthcare provider how many servings to have from each of the following food groups:  · Grains:      ¨ Whole-wheat breads, cereals, and pastas, and brown rice    ¨ Low-fat, low-sodium crackers and chips    · Vegetables:      ¨ Broccoli, green beans, green peas, and spinach    ¨ Collards, kale, and lima beans    ¨ Carrots, sweet potatoes, tomatoes, and peppers    ¨ Canned vegetables with no salt added    · Fruits:      ¨ Bananas, peaches, pears, and pineapple    ¨ Grapes, raisins, and dates    ¨ Oranges, tangerines, grapefruit, orange juice, and grapefruit juice    ¨ Apricots, mangoes, melons, and papaya    ¨ Raspberries and strawberries    ¨ Canned fruit with no added sugar    · Low-fat dairy products:      ¨ Nonfat (skim) milk, 1% milk, and low-fat almond, cashew, or soy milks fortified with calcium    ¨ Low-fat cheese, regular or frozen yogurt, and cottage cheese    · Meats and proteins , such as lean cuts of beef and pork (loin, leg, round), skinless chicken and turkey, legumes, soy products, egg whites, and nuts  Foods and drinks to limit or avoid:  Ask your dietitian or healthcare provider about these and other foods that are high in unhealthy fat, sodium, and sugar:  · Snack or packaged foods , such as frozen dinners, cookies, macaroni and cheese, and cereals with more than 300 mg of sodium per serving    · Canned or dry mixes  for cakes, soups, sauces, or gravies    · Vegetables with added sodium , such as instant potatoes, vegetables with added sauces, or regular canned vegetables    · Other foods high in sodium , such as ketchup, barbecue sauce, salad dressing, pickles, olives, soy sauce, and miso    · High-fat dairy foods  such as whole or 2% milk, cream cheese, or sour cream, and cheeses     · High-fat protein foods  such as high-fat cuts of beef (T-bone steaks, ribs), chicken or turkey with skin, and organ meats, such as liver    · Cured or smoked meats , such as hot dogs, whitley, and sausage    · Unhealthy fats and oils , such as butter, stick margarine, shortening, and cooking oils such as coconut or palm oil    · Food and drinks high in sugar , such as soft drinks (soda), sports drinks, sweetened tea, candy, cake, cookies, pies, and doughnuts  Other diet guidelines to follow:   · Eat more foods containing omega-3 fats  Eat fish high in omega-3 fats at least 2 times a week  · Limit alcohol  Too much alcohol can damage your heart and raise your blood pressure  Women should limit alcohol to 1 drink a day  Men should limit alcohol to 2 drinks a day  A drink of alcohol is 12 ounces of beer, 5 ounces of wine, or 1½ ounces of liquor  · Choose low-sodium foods  High-sodium foods can lead to high blood pressure  Add little or no salt to food you prepare  Use herbs and spices in place of salt  · Eat more fiber  to help lower cholesterol levels  Eat at least 5 servings of fruits and vegetables each day  Eat 3 ounces of whole-grain foods each day  Legumes (beans) are also a good source of fiber  Lifestyle guidelines:   · Do not smoke  Nicotine and other chemicals in cigarettes and cigars can cause lung and heart damage  Ask your healthcare provider for information if you currently smoke and need help to quit  E-cigarettes or smokeless tobacco still contain nicotine  Talk to your healthcare provider before you use these products  · Exercise regularly  to help you maintain a healthy weight and improve your blood pressure and cholesterol levels  Ask your healthcare provider about the best exercise plan for you  Do not start an exercise program without asking your healthcare provider  Follow up with your healthcare provider as directed:  Write down your questions so you remember to ask them during your visits     © 2017 [facility] Information is for End User's use only and may not be sold, redistributed or otherwise used for commercial purposes  All illustrations and images included in CareNotes® are the copyrighted property of [employer]  or [employer]  The above information is an  only  It is not intended as medical advice for individual conditions or treatments  Talk to your doctor, nurse or pharmacist before following any medical regimen to see if it is safe and effective for you  Chief Complaint     Chief Complaint   Patient presents with    Cough     ongoing cough         History of Present Illness       Patient comes in to evaluate for coughing  This has been fairly persistent over the last 2 and half months since having an ear infection in November  Cough is minimally productive  There is no fevers, night sweats, or chills noted  She does not sense wheezing  Did have antibiotics for the initial ear infection  Patient also needs refill on medications for anxiety  This has been stable  Also has chronic episodic low back pain  She gets relief with an as-needed Skelaxin  Review of Systems   Review of Systems   Constitutional: Negative for appetite change, chills, diaphoresis and fever  HENT: Positive for congestion and rhinorrhea  Negative for ear pain, sinus pressure and sore throat  Eyes: Negative for discharge, redness and itching  Respiratory: Positive for cough  Negative for shortness of breath and wheezing  Cardiovascular: Negative for chest pain and palpitations  Gastrointestinal: Negative for abdominal pain, diarrhea, nausea and vomiting  Psychiatric/Behavioral: Positive for sleep disturbance  Negative for decreased concentration and dysphoric mood  The patient is nervous/anxious            Current Medications       Current Outpatient Prescriptions:     albuterol (PROAIR HFA) 90 mcg/act inhaler, Inhale 2 puffs every 6 (six) hours as needed for wheezing, Disp: 8 5 g, Rfl: 1    ALPRAZolam (XANAX) 0 5 mg tablet, Take 1 tablet (0 5 mg total) by mouth 3 (three) times a day as needed for anxiety, Disp: 30 tablet, Rfl: 2    benzonatate (TESSALON) 200 MG capsule, Take 1 capsule (200 mg total) by mouth 3 (three) times a day as needed for cough, Disp: 45 capsule, Rfl: 0    HYDROcodone-homatropine (HYDROMET) 5-1 5 mg/5 mL syrup, Take 5 mL by mouth every 4 (four) hours as needed for cough Max Daily Amount: 30 mL, Disp: 473 mL, Rfl: 0    metaxalone (SKELAXIN) 800 mg tablet, Take 1 tablet (800 mg total) by mouth 3 (three) times a day, Disp: 60 tablet, Rfl: 2    phentermine 37 5 MG capsule, Take 1 capsule (37 5 mg total) by mouth every morning (Patient not taking: Reported on 11/14/2018 ), Disp: 30 capsule, Rfl: 2    predniSONE 20 mg tablet, 1 tab twice daily for 5 days, then 1 tab daily for 5 days  , Disp: 15 tablet, Rfl: 0    Current Allergies     Allergies as of 02/05/2019 - Reviewed 02/05/2019   Allergen Reaction Noted    Doxycycline GI Intolerance and Vomiting 01/17/2017    Latex  05/09/2016            The following portions of the patient's history were reviewed and updated as appropriate: allergies, current medications, past family history, past medical history, past social history, past surgical history and problem list      No past medical history on file  No past surgical history on file  Family History   Problem Relation Age of Onset    Hypertension Father     Breast cancer Paternal Grandmother     Diabetes Paternal Grandmother     Substance Abuse Neg Hx     Mental illness Neg Hx          Medications have been verified          Objective   /70 (BP Location: Left arm, Patient Position: Sitting, Cuff Size: Standard)   Pulse 88   Temp 98 3 °F (36 8 °C) (Oral)   Resp 16   Ht 5' 5 5" (1 664 m)   Wt 82 6 kg (182 lb)   SpO2 98%   BMI 29 83 kg/m²        Physical Exam     Physical Exam   Constitutional: She appears well-developed and well-nourished  No distress  HENT:   Head: Normocephalic and atraumatic  Right Ear: Tympanic membrane and external ear normal  No drainage  Left Ear: Tympanic membrane normal  No drainage  Nose: Rhinorrhea present  Right sinus exhibits no maxillary sinus tenderness  Left sinus exhibits no maxillary sinus tenderness  Mouth/Throat: No oropharyngeal exudate  Eyes: Conjunctivae and EOM are normal  Right eye exhibits no discharge  Left eye exhibits no discharge  Neck: Normal range of motion  Neck supple  No thyromegaly present  Cardiovascular: Normal rate, regular rhythm and normal heart sounds  Pulmonary/Chest: Effort normal  No respiratory distress  She has no wheezes  She has no rales  Musculoskeletal:        Lumbar back: She exhibits tenderness and bony tenderness  She exhibits no swelling and no spasm  Lymphadenopathy:     She has no cervical adenopathy  Skin: Skin is warm and dry  BMI Counseling: Body mass index is 29 83 kg/m²  Discussed the patient's BMI with her  The BMI is above average  BMI counseling and education was provided to the patient  Nutrition recommendations include reducing portion sizes, decreasing overall calorie intake, 3-5 servings of fruits/vegetables daily, moderation in carbohydrate intake and increasing intake of lean protein  Exercise recommendations include moderate aerobic physical activity for 150 minutes/week and exercising 3-5 times per week

## 2019-02-06 ENCOUNTER — TELEPHONE (OUTPATIENT)
Dept: FAMILY MEDICINE CLINIC | Facility: CLINIC | Age: 49
End: 2019-02-06

## 2019-02-06 DIAGNOSIS — R05.3 COUGH, PERSISTENT: ICD-10-CM

## 2019-02-07 NOTE — TELEPHONE ENCOUNTER
Call from Pharmacist @ [address] [place] [address]       To advise that they can not fill the Rx for the Hydromet that was prescribed yesterday until 2/11/19  Patient had just got an Rx filled a day earlier on 2/5/19 for 240ml which is now the insurance companies limit at a time            [facility] reviewed:    Patient filled Hydromet as below:  12/03/18  473ml = 15 day supply  02/05/19  240ml = 8 day supply    Note sent to Dr Dale Jacob as Da Bob

## 2020-02-24 ENCOUNTER — OFFICE VISIT (OUTPATIENT)
Dept: FAMILY MEDICINE CLINIC | Facility: CLINIC | Age: 50
End: 2020-02-24
Payer: COMMERCIAL

## 2020-02-24 ENCOUNTER — TELEPHONE (OUTPATIENT)
Dept: OTHER | Facility: OTHER | Age: 50
End: 2020-02-24

## 2020-02-24 VITALS
TEMPERATURE: 97.2 F | WEIGHT: 176 LBS | DIASTOLIC BLOOD PRESSURE: 64 MMHG | SYSTOLIC BLOOD PRESSURE: 98 MMHG | HEART RATE: 77 BPM | OXYGEN SATURATION: 97 % | HEIGHT: 65 IN | BODY MASS INDEX: 29.32 KG/M2

## 2020-02-24 DIAGNOSIS — B37.9 ANTIBIOTIC-INDUCED YEAST INFECTION: ICD-10-CM

## 2020-02-24 DIAGNOSIS — F41.9 ANXIETY: ICD-10-CM

## 2020-02-24 DIAGNOSIS — T36.95XA ANTIBIOTIC-INDUCED YEAST INFECTION: ICD-10-CM

## 2020-02-24 DIAGNOSIS — R05.3 COUGH, PERSISTENT: ICD-10-CM

## 2020-02-24 DIAGNOSIS — J01.00 ACUTE NON-RECURRENT MAXILLARY SINUSITIS: Primary | ICD-10-CM

## 2020-02-24 PROCEDURE — 1036F TOBACCO NON-USER: CPT | Performed by: NURSE PRACTITIONER

## 2020-02-24 PROCEDURE — 99213 OFFICE O/P EST LOW 20 MIN: CPT | Performed by: NURSE PRACTITIONER

## 2020-02-24 PROCEDURE — 3008F BODY MASS INDEX DOCD: CPT | Performed by: NURSE PRACTITIONER

## 2020-02-24 RX ORDER — BENZONATATE 200 MG/1
200 CAPSULE ORAL 3 TIMES DAILY PRN
Qty: 45 CAPSULE | Refills: 0 | Status: SHIPPED | OUTPATIENT
Start: 2020-02-24 | End: 2022-08-03

## 2020-02-24 RX ORDER — ALPRAZOLAM 0.5 MG/1
0.5 TABLET ORAL 3 TIMES DAILY PRN
Qty: 30 TABLET | Refills: 2 | Status: SHIPPED | OUTPATIENT
Start: 2020-02-24 | End: 2020-12-08 | Stop reason: SDUPTHER

## 2020-02-24 RX ORDER — FLUCONAZOLE 150 MG/1
150 TABLET ORAL ONCE
Qty: 2 TABLET | Refills: 0 | Status: SHIPPED | OUTPATIENT
Start: 2020-02-24 | End: 2020-02-24

## 2020-02-24 RX ORDER — AZITHROMYCIN 250 MG/1
TABLET, FILM COATED ORAL
Qty: 6 TABLET | Refills: 0 | Status: SHIPPED | OUTPATIENT
Start: 2020-02-24 | End: 2020-02-28

## 2020-02-24 RX ORDER — FLUCONAZOLE 150 MG/1
150 TABLET ORAL ONCE
Qty: 1 TABLET | Refills: 0 | Status: SHIPPED | OUTPATIENT
Start: 2020-02-24 | End: 2020-02-24

## 2020-02-24 NOTE — PROGRESS NOTES
[facility]        NAME: Karmyn Davila is a 54 y o  female  : 1970    MRN: [id number]  DATE: 2020  TIME: 6:35 PM    Assessment and Plan   Acute non-recurrent maxillary sinusitis [J01 00]  1  Acute non-recurrent maxillary sinusitis  azithromycin (ZITHROMAX) 250 mg tablet   2  Antibiotic-induced yeast infection  fluconazole (DIFLUCAN) 150 mg tablet    DISCONTINUED: fluconazole (DIFLUCAN) 150 mg tablet   3  Cough, persistent  HYDROcodone-homatropine (Hydromet) 5-1 5 mg/5 mL syrup    benzonatate (TESSALON) 200 MG capsule   4  Anxiety  ALPRAZolam (XANAX) 0 5 mg tablet         Patient Instructions     Patient Instructions   Discussed viral vs bacterial infections  ABX as ordered  Diflucan as ordered for yeast infection  Salt water gargles as directed for sore throat  Call/return with any problems or concerns    Schedule appointment for annual physical          Chief Complaint     Chief Complaint   Patient presents with    Cough     sore thorat head pressure         History of Present Illness       C/o sinus pain and pressure, sore throat, cough, congestion x 6 days  Requesting refills for cough syrup  Needs refill for Xanax  Takes as needed for anxiety  States if an antibiotic is given she will need diflucan for yeast infection due to taking antibiotics      Review of Systems   Review of Systems   Constitutional: Negative for activity change, chills, fatigue and fever  HENT: Positive for congestion, ear pain, postnasal drip, rhinorrhea, sinus pressure, sinus pain and sore throat  Eyes: Negative for pain, discharge and redness  Respiratory: Positive for cough  Negative for wheezing  Cardiovascular: Negative for chest pain  Gastrointestinal: Negative for constipation, diarrhea, nausea and vomiting  Musculoskeletal: Negative for myalgias  Skin: Negative for rash  Neurological: Positive for headaches  Negative for dizziness           Current Medications Current Outpatient Medications:     ALPRAZolam (XANAX) 0 5 mg tablet, Take 1 tablet (0 5 mg total) by mouth 3 (three) times a day as needed for anxiety, Disp: 30 tablet, Rfl: 2    albuterol (PROAIR HFA) 90 mcg/act inhaler, Inhale 2 puffs every 6 (six) hours as needed for wheezing (Patient not taking: Reported on 2/24/2020), Disp: 8 5 g, Rfl: 1    azithromycin (ZITHROMAX) 250 mg tablet, Take 2 tablets today then 1 tablet daily x 4 days, Disp: 6 tablet, Rfl: 0    benzonatate (TESSALON) 200 MG capsule, Take 1 capsule (200 mg total) by mouth 3 (three) times a day as needed for cough, Disp: 45 capsule, Rfl: 0    fluconazole (DIFLUCAN) 150 mg tablet, Take 1 tablet (150 mg total) by mouth once for 1 dose Take one tablet today, repeat in 1 week, Disp: 2 tablet, Rfl: 0    HYDROcodone-homatropine (Hydromet) 5-1 5 mg/5 mL syrup, Take 5 mL by mouth every 4 (four) hours as needed for coughMax Daily Amount: 30 mL, Disp: 240 mL, Rfl: 0    metaxalone (SKELAXIN) 800 mg tablet, Take 1 tablet (800 mg total) by mouth 3 (three) times a day (Patient not taking: Reported on 2/24/2020), Disp: 60 tablet, Rfl: 2    phentermine 37 5 MG capsule, Take 1 capsule (37 5 mg total) by mouth every morning (Patient not taking: Reported on 11/14/2018 ), Disp: 30 capsule, Rfl: 2    predniSONE 20 mg tablet, 1 tab twice daily for 5 days, then 1 tab daily for 5 days  (Patient not taking: Reported on 2/24/2020), Disp: 15 tablet, Rfl: 0    Current Allergies     Allergies as of 02/24/2020 - Reviewed 02/24/2020   Allergen Reaction Noted    Doxycycline GI Intolerance and Vomiting 01/17/2017    Latex  05/09/2016            The following portions of the patient's history were reviewed and updated as appropriate: allergies, current medications, past family history, past medical history, past social history, past surgical history and problem list      History reviewed  No pertinent past medical history  History reviewed   No pertinent surgical history  Family History   Problem Relation Age of Onset    Hypertension Father     Breast cancer Paternal Grandmother     Diabetes Paternal Grandmother     Substance Abuse Neg Hx     Mental illness Neg Hx          Medications have been verified  Objective   BP 98/64 (BP Location: Left arm, Patient Position: Sitting, Cuff Size: Large)   Pulse 77   Temp (!) 97 2 °F (36 2 °C) (Tympanic)   Ht 5' 5" (1 651 m)   Wt 79 8 kg (176 lb)   LMP 02/19/2020 (Exact Date)   SpO2 97%   BMI 29 29 kg/m²        Physical Exam     Physical Exam   Constitutional: She is oriented to person, place, and time  She appears well-developed and well-nourished  No distress  HENT:   Head: Normocephalic and atraumatic  Right Ear: Tympanic membrane, external ear and ear canal normal    Left Ear: Tympanic membrane, external ear and ear canal normal    Nose: Rhinorrhea present  No epistaxis  Right sinus exhibits maxillary sinus tenderness  Left sinus exhibits maxillary sinus tenderness  Mouth/Throat: Uvula is midline and mucous membranes are normal  Posterior oropharyngeal erythema present  No oropharyngeal exudate  Cardiovascular: Normal rate, regular rhythm and normal heart sounds  Exam reveals no gallop and no friction rub  No murmur heard  Pulmonary/Chest: Effort normal and breath sounds normal  No respiratory distress  She has no wheezes  She has no rales  Abdominal: She exhibits no distension  Musculoskeletal: Normal range of motion  Neurological: She is alert and oriented to person, place, and time  Skin: She is not diaphoretic  Psychiatric: She has a normal mood and affect  Her behavior is normal  Judgment and thought content normal    Nursing note and vitals reviewed  BMI Counseling: Body mass index is 29 29 kg/m²  The BMI is above normal  Nutrition recommendations include reducing portion sizes, moderation in carbohydrate intake and increasing intake of lean protein   Exercise recommendations include exercising 3-5 times per week

## 2020-02-26 NOTE — TELEPHONE ENCOUNTER
Called and spoke to [employer] at [employer]  He states that this was handled as they called office back yesterday to confirm that the fax they received on 2/24/2020 for Xanax was ok as it was not signed    This note is now marked as complete

## 2020-12-08 VITALS — BODY MASS INDEX: 29.32 KG/M2 | TEMPERATURE: 97.3 F | WEIGHT: 176 LBS | HEIGHT: 65 IN

## 2020-12-08 DIAGNOSIS — E66.09 CLASS 1 OBESITY DUE TO EXCESS CALORIES WITHOUT SERIOUS COMORBIDITY WITH BODY MASS INDEX (BMI) OF 32.0 TO 32.9 IN ADULT: ICD-10-CM

## 2020-12-08 DIAGNOSIS — G89.29 CHRONIC BILATERAL LOW BACK PAIN WITHOUT SCIATICA: ICD-10-CM

## 2020-12-08 DIAGNOSIS — F41.9 ANXIETY: ICD-10-CM

## 2020-12-08 DIAGNOSIS — M54.50 CHRONIC BILATERAL LOW BACK PAIN WITHOUT SCIATICA: ICD-10-CM

## 2020-12-08 RX ORDER — METAXALONE 800 MG/1
800 TABLET ORAL 3 TIMES DAILY
Qty: 60 TABLET | Refills: 0 | Status: SHIPPED | OUTPATIENT
Start: 2020-12-08 | End: 2021-07-28 | Stop reason: SDUPTHER

## 2020-12-08 RX ORDER — ALPRAZOLAM 0.5 MG/1
0.5 TABLET ORAL 3 TIMES DAILY PRN
Qty: 30 TABLET | Refills: 0 | Status: SHIPPED | OUTPATIENT
Start: 2020-12-08 | End: 2021-07-28 | Stop reason: SDUPTHER

## 2020-12-08 RX ORDER — PHENTERMINE HYDROCHLORIDE 37.5 MG/1
37.5 CAPSULE ORAL EVERY MORNING
Qty: 30 CAPSULE | Refills: 0 | Status: SHIPPED | OUTPATIENT
Start: 2020-12-08 | End: 2021-07-28 | Stop reason: SDUPTHER

## 2020-12-09 ENCOUNTER — TELEMEDICINE (OUTPATIENT)
Dept: FAMILY MEDICINE CLINIC | Facility: CLINIC | Age: 50
End: 2020-12-09
Payer: COMMERCIAL

## 2020-12-09 DIAGNOSIS — Z20.822 EXPOSURE TO COVID-19 VIRUS: ICD-10-CM

## 2020-12-09 DIAGNOSIS — Z20.822 EXPOSURE TO COVID-19 VIRUS: Primary | ICD-10-CM

## 2020-12-09 PROCEDURE — 3725F SCREEN DEPRESSION PERFORMED: CPT | Performed by: NURSE PRACTITIONER

## 2020-12-09 PROCEDURE — 99213 OFFICE O/P EST LOW 20 MIN: CPT | Performed by: NURSE PRACTITIONER

## 2020-12-09 PROCEDURE — 3008F BODY MASS INDEX DOCD: CPT | Performed by: NURSE PRACTITIONER

## 2020-12-09 PROCEDURE — 1036F TOBACCO NON-USER: CPT | Performed by: NURSE PRACTITIONER

## 2020-12-09 PROCEDURE — U0003 INFECTIOUS AGENT DETECTION BY NUCLEIC ACID (DNA OR RNA); SEVERE ACUTE RESPIRATORY SYNDROME CORONAVIRUS 2 (SARS-COV-2) (CORONAVIRUS DISEASE [COVID-19]), AMPLIFIED PROBE TECHNIQUE, MAKING USE OF HIGH THROUGHPUT TECHNOLOGIES AS DESCRIBED BY CMS-2020-01-R: HCPCS | Performed by: NURSE PRACTITIONER

## 2020-12-10 LAB — SARS-COV-2 RNA SPEC QL NAA+PROBE: NOT DETECTED

## 2020-12-11 ENCOUNTER — TELEPHONE (OUTPATIENT)
Dept: FAMILY MEDICINE CLINIC | Facility: CLINIC | Age: 50
End: 2020-12-11

## 2021-04-20 ENCOUNTER — TELEMEDICINE (OUTPATIENT)
Dept: FAMILY MEDICINE CLINIC | Facility: CLINIC | Age: 51
End: 2021-04-20
Payer: COMMERCIAL

## 2021-04-20 VITALS — TEMPERATURE: 99.3 F | BODY MASS INDEX: 31.65 KG/M2 | HEIGHT: 65 IN | WEIGHT: 190 LBS

## 2021-04-20 DIAGNOSIS — B34.9 VIRAL INFECTION, UNSPECIFIED: Primary | ICD-10-CM

## 2021-04-20 DIAGNOSIS — B34.9 VIRAL INFECTION, UNSPECIFIED: ICD-10-CM

## 2021-04-20 PROCEDURE — U0003 INFECTIOUS AGENT DETECTION BY NUCLEIC ACID (DNA OR RNA); SEVERE ACUTE RESPIRATORY SYNDROME CORONAVIRUS 2 (SARS-COV-2) (CORONAVIRUS DISEASE [COVID-19]), AMPLIFIED PROBE TECHNIQUE, MAKING USE OF HIGH THROUGHPUT TECHNOLOGIES AS DESCRIBED BY CMS-2020-01-R: HCPCS | Performed by: NURSE PRACTITIONER

## 2021-04-20 PROCEDURE — 3008F BODY MASS INDEX DOCD: CPT | Performed by: FAMILY MEDICINE

## 2021-04-20 PROCEDURE — 99213 OFFICE O/P EST LOW 20 MIN: CPT | Performed by: NURSE PRACTITIONER

## 2021-04-20 PROCEDURE — U0005 INFEC AGEN DETEC AMPLI PROBE: HCPCS | Performed by: NURSE PRACTITIONER

## 2021-04-20 NOTE — PROGRESS NOTES
COVID-19 Outpatient Progress Note    Assessment/Plan:    Problem List Items Addressed This Visit     None      Visit Diagnoses     Viral infection, unspecified    -  Primary    Relevant Orders    Novel Coronavirus (Covid-19),PCR [facility] - Collected at Mobile [facility] or [facility]         Disposition:     I recommended COVID-19 PCR testing on or after day 5 since last exposure and if negative can end quarantine after 7 days  Patient was instructed to watch for symptoms until 14 days after exposure  If patient were to develop symptoms, they should immediately self isolate and call our office for further guidance  I referred patient to one of our centralized sites for a COVID-19 swab  Covid test as ordered  Remain home and quarantine as directed  Office will call you with results  Increase fluids/rest   OTC cold/cough/allergy medications as directed for treat your symptoms  Call with any problems or concernsm    I have spent 15 minutes directly with the patient  Greater than 50% of this time was spent in counseling/coordination of care regarding: instructions for management and patient and family education  Encounter provider EMA Markahm    Provider located at [address] [place], [place] [id number]    Recent Visits  No visits were found meeting these conditions  Showing recent visits within past 7 days and meeting all other requirements     Today's Visits  Date Type Provider Dept   04/20/21 Telemedicine EMA Casarez [facility]   Showing today's visits and meeting all other requirements     Future Appointments  No visits were found meeting these conditions  Showing future appointments within next 150 days and meeting all other requirements      This virtual check-in was done via [employer] and patient was informed that this is not a secure, HIPAA-compliant platform  She agrees to proceed      Patient agrees to participate in a virtual check in via telephone or video visit instead of presenting to the office to address urgent/immediate medical needs  Patient is aware this is a billable service  After connecting through [facility], the patient was identified by name and date of birth  Johanna Fox was informed that this was a telemedicine visit and that the exam was being conducted confidentially over secure lines  My office door was closed  No one else was in the room  Johanna Fox acknowledged consent and understanding of privacy and security of the telemedicine visit  I informed the patient that I have reviewed her record in Epic and presented the opportunity for her to ask any questions regarding the visit today  The patient agreed to participate  Subjective:   Johanna Fox is a 52 y o  female who is concerned about COVID-19  Patient's symptoms include fatigue, nasal congestion, anosmia, loss of taste, cough, myalgias and headache  Patient denies fever, chills, malaise, rhinorrhea, sore throat, shortness of breath, chest tightness, abdominal pain, nausea, vomiting and diarrhea  Date of symptom onset: 4/10/2021  Date of exposure: 4/5/2021    Exposure:   Contact with a person who is under investigation (PUI) for or who is positive for COVID-19 within the last 14 days?: Yes    Hospitalized recently for fever and/or lower respiratory symptoms?: No      Currently a healthcare worker that is involved in direct patient care?: No      Works in a special setting where the risk of COVID-19 transmission may be high? (this may include long-term care, correctional and correction facilities; homeless shelters; assisted-living facilities and group homes ): No      Resident in a special setting where the risk of COVID-19 transmission may be high? (this may include long-term care, correctional and correction facilities; homeless shelters; assisted-living facilities and group homes ): No      Exposed on 4/5/2021   Started with body aches, fatigue, sinus congestion on 4/10/21  Loss of taste and smell 4/17/21    Lab Results   Component Value Date    SARSCOV2 Not Detected 12/09/2020     History reviewed  No pertinent past medical history  History reviewed  No pertinent surgical history  Current Outpatient Medications   Medication Sig Dispense Refill    ALPRAZolam (XANAX) 0 5 mg tablet Take 1 tablet (0 5 mg total) by mouth 3 (three) times a day as needed for anxiety 30 tablet 0    benzonatate (TESSALON) 200 MG capsule Take 1 capsule (200 mg total) by mouth 3 (three) times a day as needed for cough 45 capsule 0    HYDROcodone-homatropine (Hydromet) 5-1 5 mg/5 mL syrup Take 5 mL by mouth every 4 (four) hours as needed for coughMax Daily Amount: 30 mL 240 mL 0    metaxalone (SKELAXIN) 800 mg tablet Take 1 tablet (800 mg total) by mouth 3 (three) times a day 60 tablet 0    phentermine 37 5 MG capsule Take 1 capsule (37 5 mg total) by mouth every morning 30 capsule 0    albuterol (PROAIR HFA) 90 mcg/act inhaler Inhale 2 puffs every 6 (six) hours as needed for wheezing (Patient not taking: Reported on 2/24/2020) 8 5 g 1    predniSONE 20 mg tablet 1 tab twice daily for 5 days, then 1 tab daily for 5 days  (Patient not taking: Reported on 4/20/2021) 15 tablet 0     No current facility-administered medications for this visit  Allergies   Allergen Reactions    Doxycycline GI Intolerance and Vomiting    Latex        Review of Systems   Constitutional: Positive for fatigue  Negative for chills and fever  HENT: Positive for congestion  Negative for rhinorrhea and sore throat  Respiratory: Positive for cough  Negative for chest tightness and shortness of breath  Gastrointestinal: Negative for abdominal pain, diarrhea, nausea and vomiting  Musculoskeletal: Positive for myalgias  Neurological: Positive for headaches       Objective:    Vitals:    04/20/21 1029   Temp: 99 3 °F (37 4 °C)   TempSrc: Temporal   Weight: 86 2 kg (190 lb)   Height: 5' 5" (1 651 m)       Physical Exam  Constitutional:       General: She is not in acute distress  Appearance: She is well-developed  She is not ill-appearing  Pulmonary:      Effort: Pulmonary effort is normal  No respiratory distress  Skin:     Coloration: Skin is not pale  Findings: No erythema  Neurological:      Mental Status: She is alert and oriented to person, place, and time  Psychiatric:         Behavior: Behavior normal        VIRTUAL VISIT DISCLAIMER    Aster Peñaanabell acknowledges that she has consented to an online visit or consultation  She understands that the online visit is based solely on information provided by her, and that, in the absence of a face-to-face physical evaluation by the physician, the diagnosis she receives is both limited and provisional in terms of accuracy and completeness  This is not intended to replace a full medical face-to-face evaluation by the physician  Aster Romano understands and accepts these terms

## 2021-04-21 LAB — SARS-COV-2 RNA RESP QL NAA+PROBE: POSITIVE

## 2021-04-22 ENCOUNTER — TELEMEDICINE (OUTPATIENT)
Dept: FAMILY MEDICINE CLINIC | Facility: CLINIC | Age: 51
End: 2021-04-22
Payer: COMMERCIAL

## 2021-04-22 ENCOUNTER — TELEPHONE (OUTPATIENT)
Dept: FAMILY MEDICINE CLINIC | Facility: CLINIC | Age: 51
End: 2021-04-22

## 2021-04-22 DIAGNOSIS — U07.1 COVID-19: Primary | ICD-10-CM

## 2021-04-22 PROCEDURE — 1036F TOBACCO NON-USER: CPT | Performed by: FAMILY MEDICINE

## 2021-04-22 PROCEDURE — 99213 OFFICE O/P EST LOW 20 MIN: CPT | Performed by: FAMILY MEDICINE

## 2021-04-22 NOTE — TELEPHONE ENCOUNTER
----- Message from [address] [place] [address],[address] sent at 4/22/2021 11:01 AM EDT -----  Call pt  Positive covid test  Isolate 10 days from onset of symptoms and fever free  Schedule f/up for Monday

## 2021-04-22 NOTE — PROGRESS NOTES
COVID-19 Outpatient Progress Note    Assessment/Plan:    Problem List Items Addressed This Visit     None         Disposition:     I recommended continued isolation until at least 24 hours have passed since recovery defined as resolution of fever without the use of fever-reducing medications AND improvement in COVID symptoms AND 10 days have passed since onset of symptoms (or 10 days have passed since date of first positive viral diagnostic test for asymptomatic patients)  I have spent 20 minutes directly with the patient  Greater than 50% of this time was spent in counseling/coordination of care regarding: instructions for management, patient and family education and impressions  As patient still having symptoms, would wait another 4-5 days before returning to normal activities  Also as  had similar symptoms but was never tested, would test him today  This would just document his infection and affect visitation with  Daughter and vaccine status  Encounter provider Sebastian Sharma MD    Provider located at [address] [place] [address]    Recent Visits  Date Type Provider Dept   04/20/21 Telemedicine EMA Ponce [address] recent visits within past 7 days and meeting all other requirements     Future Appointments  No visits were found meeting these conditions  Showing future appointments within next 150 days and meeting all other requirements      This virtual check-in was done via [employer] and patient was informed that this is a secure, HIPAA-compliant platform  She agrees to proceed  Patient agrees to participate in a virtual check in via telephone or video visit instead of presenting to the office to address urgent/immediate medical needs  Patient is aware this is a billable service      After connecting through [facility], the patient was identified by name and date of birth  Loli Correa was informed that this was a telemedicine visit and that the exam was being conducted confidentially over secure lines  My office door was closed  No one else was in the room  Loli Correa acknowledged consent and understanding of privacy and security of the telemedicine visit  I informed the patient that I have reviewed her record in Epic and presented the opportunity for her to ask any questions regarding the visit today  The patient agreed to participate  Subjective:   Loli Correa is a 52 y o  female who has been screened for COVID-19  Symptom change since last report: improving  Patient's symptoms include malaise, nasal congestion, anosmia, loss of taste, cough and headache  Patient denies fever, chills, fatigue, sore throat, shortness of breath, chest tightness, abdominal pain, nausea, vomiting, diarrhea and myalgias  Shaheed Hayward has been staying home and has isolated themselves in her home  She is taking care to not share personal items and is cleaning all surfaces that are touched often, like counters, tabletops, and doorknobs using household cleaning sprays or wipes  She is wearing a mask when she leaves her room  Date of symptom onset: 4/10/2021  Date of exposure: 4/5/2021  Date of positive COVID-19 PCR: 4/20/2021    Lab Results   Component Value Date    SARSCOV2 Positive (A) 04/20/2021    SARSCOV2 Not Detected 12/09/2020     No past medical history on file  No past surgical history on file    Current Outpatient Medications   Medication Sig Dispense Refill    albuterol (PROAIR HFA) 90 mcg/act inhaler Inhale 2 puffs every 6 (six) hours as needed for wheezing (Patient not taking: Reported on 2/24/2020) 8 5 g 1    ALPRAZolam (XANAX) 0 5 mg tablet Take 1 tablet (0 5 mg total) by mouth 3 (three) times a day as needed for anxiety 30 tablet 0    benzonatate (TESSALON) 200 MG capsule Take 1 capsule (200 mg total) by mouth 3 (three) times a day as needed for cough 45 capsule 0    HYDROcodone-homatropine (Hydromet) 5-1 5 mg/5 mL syrup Take 5 mL by mouth every 4 (four) hours as needed for coughMax Daily Amount: 30 mL 240 mL 0    metaxalone (SKELAXIN) 800 mg tablet Take 1 tablet (800 mg total) by mouth 3 (three) times a day 60 tablet 0    phentermine 37 5 MG capsule Take 1 capsule (37 5 mg total) by mouth every morning 30 capsule 0    predniSONE 20 mg tablet 1 tab twice daily for 5 days, then 1 tab daily for 5 days  (Patient not taking: Reported on 4/20/2021) 15 tablet 0     No current facility-administered medications for this visit  Allergies   Allergen Reactions    Doxycycline GI Intolerance and Vomiting    Latex        Review of Systems   Constitutional: Negative for chills, fatigue and fever  HENT: Positive for congestion  Negative for sore throat  Respiratory: Positive for cough  Negative for chest tightness and shortness of breath  Gastrointestinal: Negative for abdominal pain, diarrhea, nausea and vomiting  Musculoskeletal: Negative for myalgias  Neurological: Positive for headaches  Objective: There were no vitals filed for this visit  Physical Exam  Constitutional:       General: She is not in acute distress  Appearance: Normal appearance  She is not ill-appearing  HENT:      Head: Normocephalic and atraumatic  Nose: Congestion present  Pulmonary:      Effort: Pulmonary effort is normal  No respiratory distress  Neurological:      Mental Status: She is alert  Psychiatric:         Mood and Affect: Mood normal          Behavior: Behavior normal        VIRTUAL VISIT DISCLAIMER    Garth Dove acknowledges that she has consented to an online visit or consultation  She understands that the online visit is based solely on information provided by her, and that, in the absence of a face-to-face physical evaluation by the physician, the diagnosis she receives is both limited and provisional in terms of accuracy and completeness   This is not intended to replace a full medical face-to-face evaluation by the physician  Roma Gilman understands and accepts these terms

## 2021-07-28 ENCOUNTER — OFFICE VISIT (OUTPATIENT)
Dept: FAMILY MEDICINE CLINIC | Facility: CLINIC | Age: 51
End: 2021-07-28
Payer: COMMERCIAL

## 2021-07-28 VITALS
SYSTOLIC BLOOD PRESSURE: 124 MMHG | OXYGEN SATURATION: 95 % | HEART RATE: 84 BPM | WEIGHT: 201.6 LBS | BODY MASS INDEX: 33.59 KG/M2 | HEIGHT: 65 IN | RESPIRATION RATE: 16 BRPM | DIASTOLIC BLOOD PRESSURE: 78 MMHG

## 2021-07-28 DIAGNOSIS — E66.09 CLASS 1 OBESITY DUE TO EXCESS CALORIES WITHOUT SERIOUS COMORBIDITY WITH BODY MASS INDEX (BMI) OF 32.0 TO 32.9 IN ADULT: ICD-10-CM

## 2021-07-28 DIAGNOSIS — G89.29 CHRONIC BILATERAL LOW BACK PAIN WITHOUT SCIATICA: ICD-10-CM

## 2021-07-28 DIAGNOSIS — R53.83 FATIGUE, UNSPECIFIED TYPE: ICD-10-CM

## 2021-07-28 DIAGNOSIS — E78.2 MIXED HYPERLIPIDEMIA: Primary | ICD-10-CM

## 2021-07-28 DIAGNOSIS — F41.9 ANXIETY: ICD-10-CM

## 2021-07-28 DIAGNOSIS — Z00.00 WELLNESS EXAMINATION: ICD-10-CM

## 2021-07-28 DIAGNOSIS — L28.0 NEURODERMATITIS: ICD-10-CM

## 2021-07-28 DIAGNOSIS — M54.50 CHRONIC BILATERAL LOW BACK PAIN WITHOUT SCIATICA: ICD-10-CM

## 2021-07-28 PROCEDURE — 99214 OFFICE O/P EST MOD 30 MIN: CPT | Performed by: FAMILY MEDICINE

## 2021-07-28 PROCEDURE — 1036F TOBACCO NON-USER: CPT | Performed by: FAMILY MEDICINE

## 2021-07-28 PROCEDURE — 3008F BODY MASS INDEX DOCD: CPT | Performed by: FAMILY MEDICINE

## 2021-07-28 PROCEDURE — 99396 PREV VISIT EST AGE 40-64: CPT | Performed by: FAMILY MEDICINE

## 2021-07-28 RX ORDER — METAXALONE 800 MG/1
800 TABLET ORAL 3 TIMES DAILY
Qty: 90 TABLET | Refills: 3 | Status: SHIPPED | OUTPATIENT
Start: 2021-07-28 | End: 2022-08-01 | Stop reason: SDUPTHER

## 2021-07-28 RX ORDER — LIDOCAINE HYDROCHLORIDE 20 MG/ML
JELLY TOPICAL AS NEEDED
Qty: 30 ML | Refills: 5 | Status: SHIPPED | OUTPATIENT
Start: 2021-07-28 | End: 2022-08-03

## 2021-07-28 RX ORDER — PREDNISONE 20 MG/1
TABLET ORAL
Qty: 15 TABLET | Refills: 0 | Status: SHIPPED | OUTPATIENT
Start: 2021-07-28 | End: 2022-08-03

## 2021-07-28 RX ORDER — PHENTERMINE HYDROCHLORIDE 37.5 MG/1
37.5 CAPSULE ORAL EVERY MORNING
Qty: 30 CAPSULE | Refills: 2 | Status: SHIPPED | OUTPATIENT
Start: 2021-07-28

## 2021-07-28 RX ORDER — HYDROXYZINE PAMOATE 50 MG/1
50 CAPSULE ORAL 3 TIMES DAILY PRN
Qty: 30 CAPSULE | Refills: 3 | Status: SHIPPED | OUTPATIENT
Start: 2021-07-28 | End: 2022-08-03

## 2021-07-28 RX ORDER — ALPRAZOLAM 0.5 MG/1
0.5 TABLET ORAL 3 TIMES DAILY PRN
Qty: 90 TABLET | Refills: 5 | Status: SHIPPED | OUTPATIENT
Start: 2021-07-28 | End: 2022-02-26 | Stop reason: SDUPTHER

## 2021-07-28 NOTE — PROGRESS NOTES
HPI:  Yemi Perez is a 54 y o  female here for her yearly health maintenance exam    Patient Active Problem List   Diagnosis    Anxiety     History reviewed  No pertinent past medical history  1  Advanced Directive: n     2  Durable Power of  for Healthcare: n     3  Social History:           Drug and alcohol History: n                  4  Immunizations up to date: y                 Lifestyle:                           Healthy Diet:y                          Alcohol Use:y                          Tobacco Use:n                          Regular exercise:y                          Weight concerns:y                               5   Over the past 2 weeks, how often have you been bothered by the following:              Little interest or pleasure in doing things:n              Felling down, depressed or hopeless:n       Current Outpatient Medications   Medication Sig Dispense Refill    ALPRAZolam (XANAX) 0 5 mg tablet Take 1 tablet (0 5 mg total) by mouth 3 (three) times a day as needed for anxiety 90 tablet 5    metaxalone (SKELAXIN) 800 mg tablet Take 1 tablet (800 mg total) by mouth 3 (three) times a day 90 tablet 3    phentermine 37 5 MG capsule Take 1 capsule (37 5 mg total) by mouth every morning 30 capsule 2    albuterol (PROAIR HFA) 90 mcg/act inhaler Inhale 2 puffs every 6 (six) hours as needed for wheezing (Patient not taking: Reported on 2/24/2020) 8 5 g 1    benzonatate (TESSALON) 200 MG capsule Take 1 capsule (200 mg total) by mouth 3 (three) times a day as needed for cough (Patient not taking: Reported on 7/28/2021) 45 capsule 0    HYDROcodone-homatropine (Hydromet) 5-1 5 mg/5 mL syrup Take 5 mL by mouth every 4 (four) hours as needed for coughMax Daily Amount: 30 mL (Patient not taking: Reported on 7/28/2021) 240 mL 0    hydrOXYzine pamoate (VISTARIL) 50 mg capsule Take 1 capsule (50 mg total) by mouth 3 (three) times a day as needed for itching 30 capsule 3    lidocaine (XYLOCAINE) 2 % topical gel Apply topically as needed for mild pain 30 mL 5    predniSONE 20 mg tablet 1 tab twice daily for 5 days, then 1 tab daily for 5 days  (Patient not taking: Reported on 4/20/2021) 15 tablet 0    predniSONE 20 mg tablet TAKE 1 TABLET BID X 5 DAYS THEN TAKE 1 TABLET QD FOR 5 DAYS 15 tablet 0     No current facility-administered medications for this visit  Allergies   Allergen Reactions    Doxycycline GI Intolerance and Vomiting    Latex     Other Hives     Immunization History   Administered Date(s) Administered    Influenza Quadrivalent Preservative Free 3 years and older IM 01/17/2017    Tdap 01/12/2015       Patient Care Team:  Paresh Curiel MD as PCP - General    Review of Systems   Constitutional: Negative for appetite change, chills, diaphoresis and fever  HENT: Negative for ear pain, rhinorrhea, sinus pressure and sore throat  Eyes: Negative for discharge, redness and itching  Respiratory: Negative for cough, shortness of breath and wheezing  Cardiovascular: Negative for chest pain and palpitations  Gastrointestinal: Negative for abdominal pain, diarrhea, nausea and vomiting  Physical Exam :  Physical Exam  Vitals and nursing note reviewed  Constitutional:       General: She is not in acute distress  Appearance: She is well-developed  She is not diaphoretic  HENT:      Right Ear: Tympanic membrane, ear canal and external ear normal  Tympanic membrane is not injected  Left Ear: Tympanic membrane, ear canal and external ear normal  Tympanic membrane is not injected  Nose: Nose normal    Eyes:      General:         Right eye: No discharge  Left eye: No discharge  Conjunctiva/sclera: Conjunctivae normal       Pupils: Pupils are equal, round, and reactive to light  Neck:      Thyroid: No thyromegaly  Cardiovascular:      Rate and Rhythm: Normal rate and regular rhythm  Heart sounds: Normal heart sounds  No murmur heard       Pulmonary: Effort: Pulmonary effort is normal  No respiratory distress  Breath sounds: Normal breath sounds  No wheezing  Musculoskeletal:      Cervical back: Normal range of motion and neck supple  Lymphadenopathy:      Cervical: No cervical adenopathy  Assessment and Plan:  1  Mixed hyperlipidemia  Comprehensive metabolic panel    Lipid Panel with Direct LDL reflex    TSH, 3rd generation with Free T4 reflex    T4, free    Comprehensive metabolic panel    Lipid Panel with Direct LDL reflex    TSH, 3rd generation with Free T4 reflex    T4, free   2  Fatigue, unspecified type  Comprehensive metabolic panel    Lipid Panel with Direct LDL reflex    TSH, 3rd generation with Free T4 reflex    T4, free    Comprehensive metabolic panel    Lipid Panel with Direct LDL reflex    TSH, 3rd generation with Free T4 reflex    T4, free   3  Wellness examination     4  Neurodermatitis  lidocaine (XYLOCAINE) 2 % topical gel    predniSONE 20 mg tablet    hydrOXYzine pamoate (VISTARIL) 50 mg capsule   5  Chronic bilateral low back pain without sciatica  metaxalone (SKELAXIN) 800 mg tablet   6  Anxiety  ALPRAZolam (XANAX) 0 5 mg tablet   7   Class 1 obesity due to excess calories without serious comorbidity with body mass index (BMI) of 32 0 to 32 9 in adult  phentermine 37 5 MG capsule       Health Maintenance Due   Topic Date Due    Hepatitis C Screening  Never done    HIV Screening  Never done    COVID-19 Vaccine (1) Never done    Cervical Cancer Screening  Never done    Breast Cancer Screening: Mammogram  06/22/2021    Influenza Vaccine (1) 09/01/2021    BMI: Followup Plan  12/08/2021    Depression Screening PHQ  12/09/2021

## 2021-07-28 NOTE — PROGRESS NOTES
[facility]        NAME: Florence Hylton is a 54 y o  female  : 1970    MRN: [id number]  DATE: 2021  TIME: 10:44 AM    Assessment and Plan   Mixed hyperlipidemia [E78 2]  1  Mixed hyperlipidemia  Comprehensive metabolic panel    Lipid Panel with Direct LDL reflex    TSH, 3rd generation with Free T4 reflex    T4, free    Comprehensive metabolic panel    Lipid Panel with Direct LDL reflex    TSH, 3rd generation with Free T4 reflex    T4, free   2  Fatigue, unspecified type  Comprehensive metabolic panel    Lipid Panel with Direct LDL reflex    TSH, 3rd generation with Free T4 reflex    T4, free    Comprehensive metabolic panel    Lipid Panel with Direct LDL reflex    TSH, 3rd generation with Free T4 reflex    T4, free   3  Wellness examination     4  Neurodermatitis  lidocaine (XYLOCAINE) 2 % topical gel    predniSONE 20 mg tablet    hydrOXYzine pamoate (VISTARIL) 50 mg capsule   5  Chronic bilateral low back pain without sciatica  metaxalone (SKELAXIN) 800 mg tablet   6  Anxiety  ALPRAZolam (XANAX) 0 5 mg tablet   7  Class 1 obesity due to excess calories without serious comorbidity with body mass index (BMI) of 32 0 to 32 9 in adult  phentermine 37 5 MG capsule         Patient Instructions     There are no Patient Instructions on file for this visit  Chief Complaint     Chief Complaint   Patient presents with    Physical Exam         History of Present Illness       F/u assessed med cond--stable      Review of Systems   Review of Systems   Constitutional: Negative for fatigue, fever and unexpected weight change  HENT: Negative for congestion, sinus pain and sore throat  Eyes: Negative for visual disturbance  Respiratory: Negative for shortness of breath and wheezing  Cardiovascular: Negative for chest pain and palpitations  Gastrointestinal: Negative for abdominal pain, nausea and vomiting  Musculoskeletal: Negative  Negative for arthralgias and myalgias  Neurological: Negative for syncope, weakness and numbness  Psychiatric/Behavioral: Negative  Negative for confusion, dysphoric mood and suicidal ideas  Current Medications       Current Outpatient Medications:     ALPRAZolam (XANAX) 0 5 mg tablet, Take 1 tablet (0 5 mg total) by mouth 3 (three) times a day as needed for anxiety, Disp: 90 tablet, Rfl: 5    metaxalone (SKELAXIN) 800 mg tablet, Take 1 tablet (800 mg total) by mouth 3 (three) times a day, Disp: 90 tablet, Rfl: 3    phentermine 37 5 MG capsule, Take 1 capsule (37 5 mg total) by mouth every morning, Disp: 30 capsule, Rfl: 2    albuterol (PROAIR HFA) 90 mcg/act inhaler, Inhale 2 puffs every 6 (six) hours as needed for wheezing (Patient not taking: Reported on 2/24/2020), Disp: 8 5 g, Rfl: 1    benzonatate (TESSALON) 200 MG capsule, Take 1 capsule (200 mg total) by mouth 3 (three) times a day as needed for cough (Patient not taking: Reported on 7/28/2021), Disp: 45 capsule, Rfl: 0    HYDROcodone-homatropine (Hydromet) 5-1 5 mg/5 mL syrup, Take 5 mL by mouth every 4 (four) hours as needed for coughMax Daily Amount: 30 mL (Patient not taking: Reported on 7/28/2021), Disp: 240 mL, Rfl: 0    hydrOXYzine pamoate (VISTARIL) 50 mg capsule, Take 1 capsule (50 mg total) by mouth 3 (three) times a day as needed for itching, Disp: 30 capsule, Rfl: 3    lidocaine (XYLOCAINE) 2 % topical gel, Apply topically as needed for mild pain, Disp: 30 mL, Rfl: 5    predniSONE 20 mg tablet, 1 tab twice daily for 5 days, then 1 tab daily for 5 days   (Patient not taking: Reported on 4/20/2021), Disp: 15 tablet, Rfl: 0    predniSONE 20 mg tablet, TAKE 1 TABLET BID X 5 DAYS THEN TAKE 1 TABLET QD FOR 5 DAYS, Disp: 15 tablet, Rfl: 0    Current Allergies     Allergies as of 07/28/2021 - Reviewed 07/28/2021   Allergen Reaction Noted    Doxycycline GI Intolerance and Vomiting 01/17/2017    Latex  05/09/2016    Other Hives 07/28/2021            The following portions of the patient's history were reviewed and updated as appropriate: allergies, current medications, past family history, past medical history, past social history, past surgical history and problem list      History reviewed  No pertinent past medical history  History reviewed  No pertinent surgical history  Family History   Problem Relation Age of Onset    Hypertension Father     Breast cancer Paternal Grandmother     Diabetes Paternal Grandmother     Substance Abuse Neg Hx     Mental illness Neg Hx          Medications have been verified  Objective   /78   Pulse 84   Resp 16   Ht 5' 5" (1 651 m)   Wt 91 4 kg (201 lb 9 6 oz)   LMP 07/27/2021   SpO2 95%   BMI 33 55 kg/m²        Physical Exam     Physical Exam  Constitutional:       Appearance: She is well-developed  HENT:      Right Ear: Ear canal normal  Tympanic membrane is not injected  Left Ear: Ear canal normal  Tympanic membrane is not injected  Nose: Nose normal    Eyes:      General:         Right eye: No discharge  Left eye: No discharge  Conjunctiva/sclera: Conjunctivae normal       Pupils: Pupils are equal, round, and reactive to light  Neck:      Thyroid: No thyromegaly  Cardiovascular:      Rate and Rhythm: Normal rate and regular rhythm  Heart sounds: Normal heart sounds  No murmur heard  Pulmonary:      Effort: Pulmonary effort is normal  No respiratory distress  Breath sounds: Normal breath sounds  No wheezing  Abdominal:      General: Bowel sounds are normal  There is no distension  Palpations: Abdomen is soft  Tenderness: There is no abdominal tenderness  Musculoskeletal:         General: Normal range of motion  Cervical back: Normal range of motion and neck supple  Lymphadenopathy:      Cervical: No cervical adenopathy  Skin:     General: Skin is warm and dry     Neurological:      Mental Status: She is alert and oriented to person, place, and time  She is not disoriented  Sensory: No sensory deficit  Gait: Gait normal       Deep Tendon Reflexes: Reflexes are normal and symmetric  Psychiatric:         Speech: Speech normal          Behavior: Behavior normal          Thought Content:  Thought content normal          Judgment: Judgment normal

## 2021-07-29 LAB
ALBUMIN SERPL-MCNC: 3.8 G/DL (ref 3.6–5.1)
ALBUMIN/GLOB SERPL: 1.6 (CALC) (ref 1–2.5)
ALP SERPL-CCNC: 68 U/L (ref 31–125)
ALT SERPL-CCNC: 10 U/L (ref 6–29)
AST SERPL-CCNC: 12 U/L (ref 10–35)
BILIRUB SERPL-MCNC: 0.4 MG/DL (ref 0.2–1.2)
BUN SERPL-MCNC: 14 MG/DL (ref 7–25)
BUN/CREAT SERPL: NORMAL (CALC) (ref 6–22)
CALCIUM SERPL-MCNC: 8.8 MG/DL (ref 8.6–10.2)
CHLORIDE SERPL-SCNC: 106 MMOL/L (ref 98–110)
CHOLEST SERPL-MCNC: 217 MG/DL
CHOLEST/HDLC SERPL: 3.6 (CALC)
CO2 SERPL-SCNC: 29 MMOL/L (ref 20–32)
CREAT SERPL-MCNC: 0.69 MG/DL (ref 0.5–1.1)
GLOBULIN SER CALC-MCNC: 2.4 G/DL (CALC) (ref 1.9–3.7)
GLUCOSE SERPL-MCNC: 84 MG/DL (ref 65–99)
HDLC SERPL-MCNC: 61 MG/DL
LDLC SERPL CALC-MCNC: 139 MG/DL (CALC)
NONHDLC SERPL-MCNC: 156 MG/DL (CALC)
POTASSIUM SERPL-SCNC: 4.1 MMOL/L (ref 3.5–5.3)
PROT SERPL-MCNC: 6.2 G/DL (ref 6.1–8.1)
SL AMB EGFR AFRICAN AMERICAN: 118 ML/MIN/1.73M2
SL AMB EGFR NON AFRICAN AMERICAN: 102 ML/MIN/1.73M2
SODIUM SERPL-SCNC: 139 MMOL/L (ref 135–146)
T4 FREE SERPL-MCNC: 1.1 NG/DL (ref 0.8–1.8)
TRIGL SERPL-MCNC: 80 MG/DL
TSH SERPL-ACNC: 2.7 MIU/L

## 2021-07-30 ENCOUNTER — TELEPHONE (OUTPATIENT)
Dept: FAMILY MEDICINE CLINIC | Facility: CLINIC | Age: 51
End: 2021-07-30

## 2021-07-30 NOTE — TELEPHONE ENCOUNTER
----- Message from Kinza Velazco MD sent at 7/30/2021  7:37 AM EDT -----  MM/LABS 12MOS--current labs stable

## 2021-07-30 NOTE — TELEPHONE ENCOUNTER
Pt aware--pt is concerned about the cholesterol numbers--pt states there is high blood pressures & heart disease that runs in the family and wants to now if you think she would need further testing?

## 2021-08-02 ENCOUNTER — TELEPHONE (OUTPATIENT)
Dept: FAMILY MEDICINE CLINIC | Facility: CLINIC | Age: 51
End: 2021-08-02

## 2021-11-11 ENCOUNTER — OFFICE VISIT (OUTPATIENT)
Dept: DERMATOLOGY | Facility: CLINIC | Age: 51
End: 2021-11-11
Payer: COMMERCIAL

## 2021-11-11 VITALS — HEIGHT: 66 IN | TEMPERATURE: 98.1 F | WEIGHT: 196 LBS | BODY MASS INDEX: 31.5 KG/M2

## 2021-11-11 DIAGNOSIS — Z12.83 SKIN EXAM, SCREENING FOR CANCER: Primary | ICD-10-CM

## 2021-11-11 DIAGNOSIS — Z78.9 NORMAL SKIN APPEARANCE: ICD-10-CM

## 2021-11-11 PROBLEM — L85.8 KERATOSIS PILARIS: Status: ACTIVE | Noted: 2021-11-11

## 2021-11-11 PROCEDURE — 99203 OFFICE O/P NEW LOW 30 MIN: CPT | Performed by: DERMATOLOGY

## 2021-11-11 PROCEDURE — 1036F TOBACCO NON-USER: CPT | Performed by: DERMATOLOGY

## 2021-11-11 PROCEDURE — 3008F BODY MASS INDEX DOCD: CPT | Performed by: DERMATOLOGY

## 2022-01-04 ENCOUNTER — TELEMEDICINE (OUTPATIENT)
Dept: FAMILY MEDICINE CLINIC | Facility: CLINIC | Age: 52
End: 2022-01-04
Payer: COMMERCIAL

## 2022-01-04 DIAGNOSIS — Z20.822 EXPOSURE TO COVID-19 VIRUS: Primary | ICD-10-CM

## 2022-01-04 PROCEDURE — U0003 INFECTIOUS AGENT DETECTION BY NUCLEIC ACID (DNA OR RNA); SEVERE ACUTE RESPIRATORY SYNDROME CORONAVIRUS 2 (SARS-COV-2) (CORONAVIRUS DISEASE [COVID-19]), AMPLIFIED PROBE TECHNIQUE, MAKING USE OF HIGH THROUGHPUT TECHNOLOGIES AS DESCRIBED BY CMS-2020-01-R: HCPCS | Performed by: FAMILY MEDICINE

## 2022-01-04 PROCEDURE — U0005 INFEC AGEN DETEC AMPLI PROBE: HCPCS | Performed by: FAMILY MEDICINE

## 2022-01-04 PROCEDURE — 99213 OFFICE O/P EST LOW 20 MIN: CPT | Performed by: FAMILY MEDICINE

## 2022-01-04 NOTE — PROGRESS NOTES
COVID-19 Outpatient Progress Note    Assessment/Plan:    Problem List Items Addressed This Visit     None         Disposition:     I recommended the patient to come to our office to perform rapid antigen testing for COVID-19  Recommended patient to come to the office to test for COVID-19  I have spent 10 minutes directly with the patient  Greater than 50% of this time was spent in counseling/coordination of care regarding: instructions for management and patient and family education  Encounter provider Loc Fox MD    Provider located at [address] [place] [id number]    Recent Visits  No visits were found meeting these conditions  Showing recent visits within past 7 days and meeting all other requirements  Future Appointments  No visits were found meeting these conditions  Showing future appointments within next 150 days and meeting all other requirements     This virtual check-in was done via [facility] and patient was informed that this is a secure, HIPAA-compliant platform  She agrees to proceed  Patient agrees to participate in a virtual check in via telephone or video visit instead of presenting to the office to address urgent/immediate medical needs  Patient is aware this is a billable service  After connecting through [facility], the patient was identified by name and date of birth  Arthur Alvarado was informed that this was a telemedicine visit and that the exam was being conducted confidentially over secure lines  My office door was closed  No one else was in the room  Arthur Alvarado acknowledged consent and understanding of privacy and security of the telemedicine visit  I informed the patient that I have reviewed her record in Epic and presented the opportunity for her to ask any questions regarding the visit today  The patient agreed to participate      Verification of patient location:  Patient is located in the following state in which I hold an active license: [place]    Subjective:   Pedro Levin is a 54 y o  female who is concerned about COVID-19  Patient's symptoms include chills, fatigue, malaise, nasal congestion, rhinorrhea, sore throat, cough and headache  Patient denies fever, anosmia, loss of taste, abdominal pain, nausea, vomiting, diarrhea and myalgias  Date of symptom onset: 1/1/2022  COVID-19 vaccination status: Not vaccinated    Exposure:   Contact with a person who is under investigation (PUI) for or who is positive for COVID-19 within the last 14 days?: Yes    Hospitalized recently for fever and/or lower respiratory symptoms?: No      Currently a healthcare worker that is involved in direct patient care?: No      Works in a special setting where the risk of COVID-19 transmission may be high? (this may include long-term care, correctional and assisted facilities; homeless shelters; assisted-living facilities and group homes ): No      Resident in a special setting where the risk of COVID-19 transmission may be high? (this may include long-term care, correctional and assisted facilities; homeless shelters; assisted-living facilities and group homes ): No      Lab Results   Component Value Date    SARSCOV2 Positive (A) 04/20/2021    Tivis Brooms Not Detected 12/09/2020     No past medical history on file  No past surgical history on file    Current Outpatient Medications   Medication Sig Dispense Refill    albuterol (PROAIR HFA) 90 mcg/act inhaler Inhale 2 puffs every 6 (six) hours as needed for wheezing (Patient not taking: Reported on 2/24/2020) 8 5 g 1    ALPRAZolam (XANAX) 0 5 mg tablet Take 1 tablet (0 5 mg total) by mouth 3 (three) times a day as needed for anxiety 90 tablet 5    benzonatate (TESSALON) 200 MG capsule Take 1 capsule (200 mg total) by mouth 3 (three) times a day as needed for cough (Patient not taking: Reported on 7/28/2021) 45 capsule 0    HYDROcodone-homatropine (Hydromet) 5-1 5 mg/5 mL syrup Take 5 mL by mouth every 4 (four) hours as needed for coughMax Daily Amount: 30 mL (Patient not taking: Reported on 7/28/2021) 240 mL 0    hydrOXYzine pamoate (VISTARIL) 50 mg capsule Take 1 capsule (50 mg total) by mouth 3 (three) times a day as needed for itching (Patient not taking: Reported on 11/11/2021 ) 30 capsule 3    lidocaine (XYLOCAINE) 2 % topical gel Apply topically as needed for mild pain (Patient not taking: Reported on 11/11/2021 ) 30 mL 5    metaxalone (SKELAXIN) 800 mg tablet Take 1 tablet (800 mg total) by mouth 3 (three) times a day (Patient taking differently: Take 800 mg by mouth 3 (three) times a day prn ) 90 tablet 3    phentermine 37 5 MG capsule Take 1 capsule (37 5 mg total) by mouth every morning 30 capsule 2    predniSONE 20 mg tablet 1 tab twice daily for 5 days, then 1 tab daily for 5 days  (Patient not taking: Reported on 4/20/2021) 15 tablet 0    predniSONE 20 mg tablet TAKE 1 TABLET BID X 5 DAYS THEN TAKE 1 TABLET QD FOR 5 DAYS (Patient not taking: Reported on 11/11/2021 ) 15 tablet 0     No current facility-administered medications for this visit  Allergies   Allergen Reactions    Iodine I 131 Tositumomab Rash    Doxycycline GI Intolerance and Vomiting    Latex     Other Hives       Review of Systems   Constitutional: Positive for chills and fatigue  Negative for fever  HENT: Positive for congestion, rhinorrhea and sore throat  Respiratory: Positive for cough  Gastrointestinal: Negative for abdominal pain, diarrhea, nausea and vomiting  Musculoskeletal: Negative for myalgias  Neurological: Positive for headaches  Objective: There were no vitals filed for this visit  Physical Exam  Constitutional:       General: She is not in acute distress  Appearance: Normal appearance  She is not ill-appearing or diaphoretic  HENT:      Head: Normocephalic and atraumatic  Nose: Congestion present     Pulmonary: Effort: Pulmonary effort is normal  No respiratory distress  Neurological:      Mental Status: She is alert  Psychiatric:         Mood and Affect: Mood normal          Behavior: Behavior normal          VIRTUAL VISIT DISCLAIMER    Elena López verbally agrees to participate in [employer]  Pt is aware that [employer] could be limited without vital signs or the ability to perform a full hands-on physical Virgen Hammer understands she or the provider may request at any time to terminate the video visit and request the patient to seek care or treatment in person

## 2022-01-06 LAB — SARS-COV-2 RNA RESP QL NAA+PROBE: NEGATIVE

## 2022-01-06 NOTE — RESULT ENCOUNTER NOTE
Call patient with lab result-COVID test negative  Get progress report  This would suggest some other viral illness

## 2022-01-07 ENCOUNTER — TELEPHONE (OUTPATIENT)
Dept: FAMILY MEDICINE CLINIC | Facility: CLINIC | Age: 52
End: 2022-01-07

## 2022-01-07 NOTE — TELEPHONE ENCOUNTER
----- Message from Annamarie Alejandre MD sent at 1/6/2022  4:50 PM EST -----  Call patient with lab result-COVID test negative  Get progress report  This would suggest some other viral illness

## 2022-02-26 DIAGNOSIS — F41.9 ANXIETY: ICD-10-CM

## 2022-02-26 RX ORDER — ALPRAZOLAM 0.5 MG/1
0.5 TABLET ORAL 3 TIMES DAILY PRN
Qty: 90 TABLET | Refills: 0 | Status: SHIPPED | OUTPATIENT
Start: 2022-02-26 | End: 2022-08-01 | Stop reason: SDUPTHER

## 2022-08-01 DIAGNOSIS — G89.29 CHRONIC BILATERAL LOW BACK PAIN WITHOUT SCIATICA: ICD-10-CM

## 2022-08-01 DIAGNOSIS — M54.50 CHRONIC BILATERAL LOW BACK PAIN WITHOUT SCIATICA: ICD-10-CM

## 2022-08-01 DIAGNOSIS — F41.9 ANXIETY: ICD-10-CM

## 2022-08-01 RX ORDER — METAXALONE 800 MG/1
800 TABLET ORAL 3 TIMES DAILY
Qty: 90 TABLET | Refills: 5 | Status: SHIPPED | OUTPATIENT
Start: 2022-08-01 | End: 2022-09-20

## 2022-08-01 RX ORDER — ALPRAZOLAM 0.5 MG/1
0.5 TABLET ORAL 3 TIMES DAILY PRN
Qty: 90 TABLET | Refills: 5 | Status: SHIPPED | OUTPATIENT
Start: 2022-08-01 | End: 2022-09-27 | Stop reason: SDUPTHER

## 2022-08-01 NOTE — TELEPHONE ENCOUNTER
Pt called in again to request her 2 rx refills  Pt scheduled for med check w/TW 8/4/22  Please approve and send rx

## 2022-08-03 ENCOUNTER — OFFICE VISIT (OUTPATIENT)
Dept: FAMILY MEDICINE CLINIC | Facility: CLINIC | Age: 52
End: 2022-08-03
Payer: COMMERCIAL

## 2022-08-03 VITALS
WEIGHT: 197 LBS | BODY MASS INDEX: 32.82 KG/M2 | HEIGHT: 65 IN | OXYGEN SATURATION: 99 % | TEMPERATURE: 98.5 F | DIASTOLIC BLOOD PRESSURE: 81 MMHG | HEART RATE: 80 BPM | SYSTOLIC BLOOD PRESSURE: 114 MMHG

## 2022-08-03 DIAGNOSIS — Z12.11 SCREEN FOR COLON CANCER: Primary | ICD-10-CM

## 2022-08-03 DIAGNOSIS — Z00.00 WELLNESS EXAMINATION: ICD-10-CM

## 2022-08-03 DIAGNOSIS — F51.01 PRIMARY INSOMNIA: ICD-10-CM

## 2022-08-03 DIAGNOSIS — F41.9 ANXIETY: ICD-10-CM

## 2022-08-03 DIAGNOSIS — E78.2 MIXED HYPERLIPIDEMIA: ICD-10-CM

## 2022-08-03 PROCEDURE — 99214 OFFICE O/P EST MOD 30 MIN: CPT | Performed by: FAMILY MEDICINE

## 2022-08-03 PROCEDURE — 99396 PREV VISIT EST AGE 40-64: CPT | Performed by: FAMILY MEDICINE

## 2022-08-03 PROCEDURE — 3725F SCREEN DEPRESSION PERFORMED: CPT | Performed by: FAMILY MEDICINE

## 2022-08-03 RX ORDER — FAMOTIDINE 20 MG/1
20 TABLET, FILM COATED ORAL 2 TIMES DAILY
Qty: 30 TABLET | Refills: 5 | Status: SHIPPED | OUTPATIENT
Start: 2022-08-03 | End: 2022-09-20

## 2022-08-03 RX ORDER — QUETIAPINE FUMARATE 25 MG/1
25 TABLET, FILM COATED ORAL
Qty: 90 TABLET | Refills: 1 | Status: SHIPPED | OUTPATIENT
Start: 2022-08-03 | End: 2022-09-20

## 2022-08-03 NOTE — PROGRESS NOTES
HPI:  Rizwan Norris is a 50 y o  female here for her yearly health maintenance exam    Patient Active Problem List   Diagnosis    Anxiety    Keratosis pilaris     History reviewed  No pertinent past medical history  1  Advanced Directive: n     2  Durable Power of  for Healthcare: n     3  Social History:           Drug and alcohol History: n                  4  Immunizations up to date: y                 Lifestyle:                           Healthy Diet:y                          Alcohol Use:n                          Tobacco Use:n                          Regular exercise:y                          Weight concerns:n                               5  Over the past 2 weeks, how often have you been bothered by the following:              Little interest or pleasure in doing things:n              Felling down, depressed or hopeless:n       Current Outpatient Medications   Medication Sig Dispense Refill    ALPRAZolam (XANAX) 0 5 mg tablet Take 1 tablet (0 5 mg total) by mouth 3 (three) times a day as needed for anxiety 90 tablet 5    metaxalone (SKELAXIN) 800 mg tablet Take 1 tablet (800 mg total) by mouth 3 (three) times a day prn 90 tablet 5    phentermine 37 5 MG capsule Take 1 capsule (37 5 mg total) by mouth every morning (Patient not taking: Reported on 8/3/2022) 30 capsule 2     No current facility-administered medications for this visit  Allergies   Allergen Reactions    Iodine I 131 Tositumomab Rash    Doxycycline GI Intolerance and Vomiting    Latex     Other Hives     Immunization History   Administered Date(s) Administered    Influenza Quadrivalent Preservative Free 3 years and older IM 01/17/2017    Tdap 01/12/2015       Patient Care Team:  Nicole Valenzuela MD as PCP - General    Review of Systems   Constitutional: Negative for fatigue, fever and unexpected weight change  HENT: Negative for congestion, sinus pain and sore throat  Eyes: Negative for visual disturbance  Respiratory: Negative for shortness of breath and wheezing  Cardiovascular: Negative for chest pain and palpitations  Gastrointestinal: Negative for abdominal pain, nausea and vomiting  Musculoskeletal: Negative  Negative for arthralgias and myalgias  Neurological: Negative for syncope, weakness and numbness  Psychiatric/Behavioral: Negative  Negative for confusion, dysphoric mood and suicidal ideas  Physical Exam :  Physical Exam  Constitutional:       Appearance: She is well-developed  HENT:      Right Ear: Ear canal normal  Tympanic membrane is not injected  Left Ear: Ear canal normal  Tympanic membrane is not injected  Nose: Nose normal    Eyes:      General:         Right eye: No discharge  Left eye: No discharge  Conjunctiva/sclera: Conjunctivae normal       Pupils: Pupils are equal, round, and reactive to light  Neck:      Thyroid: No thyromegaly  Cardiovascular:      Rate and Rhythm: Normal rate and regular rhythm  Heart sounds: Normal heart sounds  No murmur heard  Pulmonary:      Effort: Pulmonary effort is normal  No respiratory distress  Breath sounds: Normal breath sounds  No wheezing  Abdominal:      General: Bowel sounds are normal  There is no distension  Palpations: Abdomen is soft  Tenderness: There is no abdominal tenderness  Musculoskeletal:         General: Normal range of motion  Cervical back: Normal range of motion and neck supple  Lymphadenopathy:      Cervical: No cervical adenopathy  Skin:     General: Skin is warm and dry  Neurological:      Mental Status: She is alert and oriented to person, place, and time  She is not disoriented  Sensory: No sensory deficit  Gait: Gait normal       Deep Tendon Reflexes: Reflexes are normal and symmetric  Psychiatric:         Speech: Speech normal          Behavior: Behavior normal          Thought Content:  Thought content normal          Judgment: Judgment normal            Assessment and Plan:  1  Screen for colon cancer  Cologuard   2  Wellness examination     3   Anxiety         Health Maintenance Due   Topic Date Due    Hepatitis C Screening  Never done    COVID-19 Vaccine (1) Never done    Depression Follow-up Plan  Never done    HIV Screening  Never done    Cervical Cancer Screening  Never done    Colorectal Cancer Screening  Never done    BMI: Followup Plan  02/24/2021    Influenza Vaccine (1) 09/01/2022

## 2022-08-03 NOTE — PROGRESS NOTES
Assessment/Plan:    No problem-specific Assessment & Plan notes found for this encounter  Diagnoses and all orders for this visit:    Screen for colon cancer  -     Cologuard    Wellness examination    Anxiety    Primary insomnia  -     QUEtiapine (SEROquel) 25 mg tablet; Take 1 tablet (25 mg total) by mouth daily at bedtime    Mixed hyperlipidemia  -     famotidine (PEPCID) 20 mg tablet; Take 1 tablet (20 mg total) by mouth 2 (two) times a day  -     CBC and differential; Future  -     Comprehensive metabolic panel; Future  -     Lipid Panel with Direct LDL reflex; Future  -     T4, free; Future  -     TSH, 3rd generation with Free T4 reflex; Future  -     CBC and differential  -     Comprehensive metabolic panel  -     Lipid Panel with Direct LDL reflex  -     T4, free  -     TSH, 3rd generation with Free T4 reflex        Depression Screening Follow-up Plan: Patient's depression screening was positive with a PHQ-2 score of 4  Their PHQ-9 score was 17  Patient assessed for underlying major depression  They have no active suicidal ideations  Brief counseling provided and recommend additional follow-up/re-evaluation next office visit  Subjective:   Chief Complaint   Patient presents with    Physical Exam        Patient ID: Pedro Levin is a 50 y o  female  HPI    The following portions of the patient's history were reviewed and updated as appropriate: allergies, current medications, past family history, past medical history, past social history, past surgical history and problem list     Review of Systems   Constitutional: Negative for appetite change, chills, diaphoresis and fever  HENT: Negative for ear pain, rhinorrhea, sinus pressure and sore throat  Eyes: Negative for discharge, redness and itching  Respiratory: Negative for cough, shortness of breath and wheezing  Cardiovascular: Negative for chest pain and palpitations     Gastrointestinal: Negative for abdominal pain, diarrhea, nausea and vomiting  Objective:  Vitals:    08/03/22 1451   BP: 114/81   BP Location: Right arm   Patient Position: Sitting   Cuff Size: Large   Pulse: 80   Temp: 98 5 °F (36 9 °C)   TempSrc: Tympanic   SpO2: 99%   Weight: 89 4 kg (197 lb)   Height: 5' 5" (1 651 m)      Physical Exam  Vitals and nursing note reviewed  Constitutional:       General: She is not in acute distress  Appearance: She is well-developed  She is not diaphoretic  HENT:      Right Ear: Tympanic membrane, ear canal and external ear normal  Tympanic membrane is not injected  Left Ear: Tympanic membrane, ear canal and external ear normal  Tympanic membrane is not injected  Nose: Nose normal    Eyes:      General:         Right eye: No discharge  Left eye: No discharge  Conjunctiva/sclera: Conjunctivae normal       Pupils: Pupils are equal, round, and reactive to light  Neck:      Thyroid: No thyromegaly  Cardiovascular:      Rate and Rhythm: Normal rate and regular rhythm  Heart sounds: Normal heart sounds  No murmur heard  Pulmonary:      Effort: Pulmonary effort is normal  No respiratory distress  Breath sounds: Normal breath sounds  No wheezing  Musculoskeletal:      Cervical back: Normal range of motion and neck supple  Lymphadenopathy:      Cervical: No cervical adenopathy

## 2022-08-23 LAB
ALBUMIN SERPL-MCNC: 4 G/DL (ref 3.6–5.1)
ALBUMIN/GLOB SERPL: 1.5 (CALC) (ref 1–2.5)
ALP SERPL-CCNC: 92 U/L (ref 37–153)
ALT SERPL-CCNC: 8 U/L (ref 6–29)
AST SERPL-CCNC: 12 U/L (ref 10–35)
BASOPHILS # BLD AUTO: 70 CELLS/UL (ref 0–200)
BASOPHILS NFR BLD AUTO: 1 %
BILIRUB SERPL-MCNC: 0.5 MG/DL (ref 0.2–1.2)
BUN SERPL-MCNC: 10 MG/DL (ref 7–25)
BUN/CREAT SERPL: NORMAL (CALC) (ref 6–22)
CALCIUM SERPL-MCNC: 8.7 MG/DL (ref 8.6–10.4)
CHLORIDE SERPL-SCNC: 104 MMOL/L (ref 98–110)
CHOLEST SERPL-MCNC: 236 MG/DL
CHOLEST/HDLC SERPL: 3.4 (CALC)
CO2 SERPL-SCNC: 24 MMOL/L (ref 20–32)
CREAT SERPL-MCNC: 0.77 MG/DL (ref 0.5–1.03)
EOSINOPHIL # BLD AUTO: 168 CELLS/UL (ref 15–500)
EOSINOPHIL NFR BLD AUTO: 2.4 %
ERYTHROCYTE [DISTWIDTH] IN BLOOD BY AUTOMATED COUNT: 13.5 % (ref 11–15)
GFR/BSA.PRED SERPLBLD CYS-BASED-ARV: 94 ML/MIN/1.73M2
GLOBULIN SER CALC-MCNC: 2.7 G/DL (CALC) (ref 1.9–3.7)
GLUCOSE SERPL-MCNC: 95 MG/DL (ref 65–99)
HCT VFR BLD AUTO: 37.7 % (ref 35–45)
HDLC SERPL-MCNC: 69 MG/DL
HGB BLD-MCNC: 12 G/DL (ref 11.7–15.5)
LDLC SERPL CALC-MCNC: 139 MG/DL (CALC)
LYMPHOCYTES # BLD AUTO: 1330 CELLS/UL (ref 850–3900)
LYMPHOCYTES NFR BLD AUTO: 19 %
MCH RBC QN AUTO: 27.6 PG (ref 27–33)
MCHC RBC AUTO-ENTMCNC: 31.8 G/DL (ref 32–36)
MCV RBC AUTO: 86.9 FL (ref 80–100)
MONOCYTES # BLD AUTO: 581 CELLS/UL (ref 200–950)
MONOCYTES NFR BLD AUTO: 8.3 %
NEUTROPHILS # BLD AUTO: 4851 CELLS/UL (ref 1500–7800)
NEUTROPHILS NFR BLD AUTO: 69.3 %
NONHDLC SERPL-MCNC: 167 MG/DL (CALC)
PLATELET # BLD AUTO: 301 THOUSAND/UL (ref 140–400)
PMV BLD REES-ECKER: 10.1 FL (ref 7.5–12.5)
POTASSIUM SERPL-SCNC: 4.1 MMOL/L (ref 3.5–5.3)
PROT SERPL-MCNC: 6.7 G/DL (ref 6.1–8.1)
RBC # BLD AUTO: 4.34 MILLION/UL (ref 3.8–5.1)
SODIUM SERPL-SCNC: 137 MMOL/L (ref 135–146)
T4 FREE SERPL-MCNC: 1.3 NG/DL (ref 0.8–1.8)
TRIGL SERPL-MCNC: 152 MG/DL
TSH SERPL-ACNC: 4.23 MIU/L
WBC # BLD AUTO: 7 THOUSAND/UL (ref 3.8–10.8)

## 2022-08-24 ENCOUNTER — TELEPHONE (OUTPATIENT)
Dept: FAMILY MEDICINE CLINIC | Facility: CLINIC | Age: 52
End: 2022-08-24

## 2022-08-24 NOTE — TELEPHONE ENCOUNTER
Pt is aware of her lab results and TW's interpretation  Pt awaits her cologuard results  Pt had her annual on 08/3/2022 and is therefore not due again until 08/3/2023

## 2022-08-24 NOTE — TELEPHONE ENCOUNTER
----- Message from Jc Jj MD sent at 8/24/2022  7:39 AM EDT -----  Labs OK---chol elev but HDL good--due annual?

## 2022-08-27 LAB — COLOGUARD RESULT REPORTABLE: NEGATIVE

## 2022-08-29 ENCOUNTER — TELEPHONE (OUTPATIENT)
Dept: FAMILY MEDICINE CLINIC | Facility: CLINIC | Age: 52
End: 2022-08-29

## 2022-09-19 ENCOUNTER — CONSULT (OUTPATIENT)
Dept: VASCULAR SURGERY | Facility: CLINIC | Age: 52
End: 2022-09-19
Payer: COMMERCIAL

## 2022-09-19 ENCOUNTER — HOSPITAL ENCOUNTER (OUTPATIENT)
Dept: VASCULAR ULTRASOUND | Facility: HOSPITAL | Age: 52
Discharge: HOME | End: 2022-09-19
Payer: COMMERCIAL

## 2022-09-19 ENCOUNTER — APPOINTMENT (EMERGENCY)
Dept: CT IMAGING | Facility: HOSPITAL | Age: 52
DRG: 176 | End: 2022-09-19
Payer: COMMERCIAL

## 2022-09-19 ENCOUNTER — HOSPITAL ENCOUNTER (INPATIENT)
Facility: HOSPITAL | Age: 52
LOS: 1 days | Discharge: HOME | DRG: 176 | End: 2022-09-20
Attending: EMERGENCY MEDICINE | Admitting: INTERNAL MEDICINE
Payer: COMMERCIAL

## 2022-09-19 VITALS
HEIGHT: 65 IN | HEART RATE: 102 BPM | BODY MASS INDEX: 34.26 KG/M2 | SYSTOLIC BLOOD PRESSURE: 110 MMHG | DIASTOLIC BLOOD PRESSURE: 62 MMHG | WEIGHT: 205.6 LBS

## 2022-09-19 DIAGNOSIS — I83.811 VARICOSE VEINS OF LEG WITH PAIN, RIGHT: ICD-10-CM

## 2022-09-19 DIAGNOSIS — I80.01 THROMBOPHLEBITIS OF SUPERFICIAL VEINS OF RIGHT LOWER EXTREMITY: Primary | ICD-10-CM

## 2022-09-19 DIAGNOSIS — R07.9 CHEST PAIN: ICD-10-CM

## 2022-09-19 DIAGNOSIS — I26.99 MULTIPLE PULMONARY EMBOLI (HCC): Primary | ICD-10-CM

## 2022-09-19 DIAGNOSIS — F41.9 ANXIETY: ICD-10-CM

## 2022-09-19 DIAGNOSIS — I82.401 DEEP VEIN THROMBOSIS (DVT) OF RIGHT LOWER EXTREMITY (HCC): ICD-10-CM

## 2022-09-19 DIAGNOSIS — I80.01 THROMBOPHLEBITIS OF SUPERFICIAL VEINS OF RIGHT LOWER EXTREMITY: ICD-10-CM

## 2022-09-19 LAB
ALBUMIN SERPL BCP-MCNC: 4 G/DL (ref 3.5–5)
ALP SERPL-CCNC: 92 U/L (ref 34–104)
ALT SERPL W P-5'-P-CCNC: 7 U/L (ref 7–52)
ANION GAP SERPL CALCULATED.3IONS-SCNC: 8 MMOL/L (ref 4–13)
AST SERPL W P-5'-P-CCNC: 12 U/L (ref 13–39)
BASOPHILS # BLD AUTO: 0.1 THOUSANDS/ΜL (ref 0–0.1)
BASOPHILS NFR BLD AUTO: 1 % (ref 0–1)
BILIRUB SERPL-MCNC: 0.41 MG/DL (ref 0.2–1)
BUN SERPL-MCNC: 16 MG/DL (ref 5–25)
CALCIUM SERPL-MCNC: 8.9 MG/DL (ref 8.4–10.2)
CARDIAC TROPONIN I PNL SERPL HS: <2 NG/L
CARDIAC TROPONIN I PNL SERPL HS: <2 NG/L
CHLORIDE SERPL-SCNC: 105 MMOL/L (ref 96–108)
CO2 SERPL-SCNC: 26 MMOL/L (ref 21–32)
CREAT SERPL-MCNC: 0.72 MG/DL (ref 0.6–1.3)
EOSINOPHIL # BLD AUTO: 0.24 THOUSAND/ΜL (ref 0–0.61)
EOSINOPHIL NFR BLD AUTO: 3 % (ref 0–6)
ERYTHROCYTE [DISTWIDTH] IN BLOOD BY AUTOMATED COUNT: 14.4 % (ref 11.6–15.1)
GFR SERPL CREATININE-BSD FRML MDRD: 97 ML/MIN/1.73SQ M
GLUCOSE SERPL-MCNC: 86 MG/DL (ref 65–140)
GLUCOSE SERPL-MCNC: 96 MG/DL (ref 65–140)
HCT VFR BLD AUTO: 36.6 % (ref 34.8–46.1)
HGB BLD-MCNC: 11.3 G/DL (ref 11.5–15.4)
HOLD SPECIMEN: NORMAL
IMM GRANULOCYTES # BLD AUTO: 0.02 THOUSAND/UL (ref 0–0.2)
IMM GRANULOCYTES NFR BLD AUTO: 0 % (ref 0–2)
LYMPHOCYTES # BLD AUTO: 1.83 THOUSANDS/ΜL (ref 0.6–4.47)
LYMPHOCYTES NFR BLD AUTO: 23 % (ref 14–44)
MCH RBC QN AUTO: 26.6 PG (ref 26.8–34.3)
MCHC RBC AUTO-ENTMCNC: 30.9 G/DL (ref 31.4–37.4)
MCV RBC AUTO: 86 FL (ref 82–98)
MONOCYTES # BLD AUTO: 0.69 THOUSAND/ΜL (ref 0.17–1.22)
MONOCYTES NFR BLD AUTO: 9 % (ref 4–12)
NEUTROPHILS # BLD AUTO: 4.95 THOUSANDS/ΜL (ref 1.85–7.62)
NEUTS SEG NFR BLD AUTO: 64 % (ref 43–75)
NRBC BLD AUTO-RTO: 0 /100 WBCS
PLATELET # BLD AUTO: 307 THOUSANDS/UL (ref 149–390)
PMV BLD AUTO: 9.5 FL (ref 8.9–12.7)
POTASSIUM SERPL-SCNC: 3.8 MMOL/L (ref 3.5–5.3)
PROT SERPL-MCNC: 7.3 G/DL (ref 6.4–8.4)
RBC # BLD AUTO: 4.25 MILLION/UL (ref 3.81–5.12)
SODIUM SERPL-SCNC: 139 MMOL/L (ref 135–147)
WBC # BLD AUTO: 7.83 THOUSAND/UL (ref 4.31–10.16)

## 2022-09-19 PROCEDURE — 81240 F2 GENE: CPT | Performed by: EMERGENCY MEDICINE

## 2022-09-19 PROCEDURE — 85303 CLOT INHIBIT PROT C ACTIVITY: CPT | Performed by: EMERGENCY MEDICINE

## 2022-09-19 PROCEDURE — 84484 ASSAY OF TROPONIN QUANT: CPT | Performed by: EMERGENCY MEDICINE

## 2022-09-19 PROCEDURE — 85306 CLOT INHIBIT PROT S FREE: CPT | Performed by: EMERGENCY MEDICINE

## 2022-09-19 PROCEDURE — 86147 CARDIOLIPIN ANTIBODY EA IG: CPT | Performed by: EMERGENCY MEDICINE

## 2022-09-19 PROCEDURE — G1004 CDSM NDSC: HCPCS

## 2022-09-19 PROCEDURE — 93005 ELECTROCARDIOGRAM TRACING: CPT

## 2022-09-19 PROCEDURE — 85613 RUSSELL VIPER VENOM DILUTED: CPT | Performed by: EMERGENCY MEDICINE

## 2022-09-19 PROCEDURE — 99285 EMERGENCY DEPT VISIT HI MDM: CPT

## 2022-09-19 PROCEDURE — 80053 COMPREHEN METABOLIC PANEL: CPT | Performed by: EMERGENCY MEDICINE

## 2022-09-19 PROCEDURE — 85732 THROMBOPLASTIN TIME PARTIAL: CPT | Performed by: EMERGENCY MEDICINE

## 2022-09-19 PROCEDURE — 93971 EXTREMITY STUDY: CPT

## 2022-09-19 PROCEDURE — 82948 REAGENT STRIP/BLOOD GLUCOSE: CPT

## 2022-09-19 PROCEDURE — 93971 EXTREMITY STUDY: CPT | Performed by: SURGERY

## 2022-09-19 PROCEDURE — 85670 THROMBIN TIME PLASMA: CPT | Performed by: EMERGENCY MEDICINE

## 2022-09-19 PROCEDURE — 99204 OFFICE O/P NEW MOD 45 MIN: CPT | Performed by: PHYSICIAN ASSISTANT

## 2022-09-19 PROCEDURE — 86146 BETA-2 GLYCOPROTEIN ANTIBODY: CPT | Performed by: EMERGENCY MEDICINE

## 2022-09-19 PROCEDURE — 99284 EMERGENCY DEPT VISIT MOD MDM: CPT | Performed by: EMERGENCY MEDICINE

## 2022-09-19 PROCEDURE — 85300 ANTITHROMBIN III ACTIVITY: CPT | Performed by: EMERGENCY MEDICINE

## 2022-09-19 PROCEDURE — 36415 COLL VENOUS BLD VENIPUNCTURE: CPT

## 2022-09-19 PROCEDURE — 85305 CLOT INHIBIT PROT S TOTAL: CPT | Performed by: EMERGENCY MEDICINE

## 2022-09-19 PROCEDURE — 85025 COMPLETE CBC W/AUTO DIFF WBC: CPT | Performed by: EMERGENCY MEDICINE

## 2022-09-19 PROCEDURE — 85705 THROMBOPLASTIN INHIBITION: CPT | Performed by: EMERGENCY MEDICINE

## 2022-09-19 PROCEDURE — 71275 CT ANGIOGRAPHY CHEST: CPT

## 2022-09-19 PROCEDURE — 81241 F5 GENE: CPT | Performed by: EMERGENCY MEDICINE

## 2022-09-19 RX ORDER — DIAZEPAM 5 MG/ML
2.5 INJECTION, SOLUTION INTRAMUSCULAR; INTRAVENOUS ONCE
Status: COMPLETED | OUTPATIENT
Start: 2022-09-19 | End: 2022-09-19

## 2022-09-19 RX ORDER — ENOXAPARIN SODIUM 100 MG/ML
1 INJECTION SUBCUTANEOUS EVERY 12 HOURS SCHEDULED
Status: DISCONTINUED | OUTPATIENT
Start: 2022-09-19 | End: 2022-09-20

## 2022-09-19 RX ADMIN — MORPHINE SULFATE 2 MG: 2 INJECTION, SOLUTION INTRAMUSCULAR; INTRAVENOUS at 23:25

## 2022-09-19 RX ADMIN — ENOXAPARIN SODIUM 90 MG: 100 INJECTION SUBCUTANEOUS at 23:25

## 2022-09-19 RX ADMIN — DIAZEPAM 2.5 MG: 10 INJECTION, SOLUTION INTRAMUSCULAR; INTRAVENOUS at 23:26

## 2022-09-19 RX ADMIN — IOHEXOL 85 ML: 350 INJECTION, SOLUTION INTRAVENOUS at 21:38

## 2022-09-19 NOTE — PROGRESS NOTES
Assessment/Plan:    Superficial thrombophlebitis (R anterior thigh)  -     Compression Stocking  -     VAS lower limb venous duplex study, unilateral/limited; Future  -2 weeks of increased pain, redness and firm veins over the R thigh and lateral calf  -No increased activity, trauma or long car rides  -17 year Hx of RLE varicose veins with pain, aching, itching    -Exam:      RIGHT anterior thigh is pink with firm vein   RLE varicosities over the anterior thigh running laterally down the leg into the calf  The lateral leg veins are tender but not firm  Varicosities are up to about 1 cm wide   No leg swelling or stigmata of chronic venous stasis changes  Skin healthy and intact otherwise  Calf non-tender  A/P: Patient with R Leg redness and pain associated with tender varicose veins x 2 weeks    -stat venous duplex to evaluate superficial thrombophlebitis, r/o DVT  -conservative measures for right thigh superficial thrombophlebitis  -thigh-high compression, periodic elevation, warm compresses  -occasional use of NSAIDs for pain may be used (denies c/i)  -call 911 or go to the emergency department if you develop chest pain or increased shortness of breath  -follow-up in about 6-8 weeks or sooner, if needed      Varicose veins with pain  -     Compression Stocking  -     VAS lower limb venous duplex study, unilateral/limited; Future  -17 year Hx of RLE varicose veins since pregnancy  -we had a long and detailed discussion regarding her history, sx and treatment for venous disease  -continue with compression, periodic elevation, exercise and weight loss  -we will re-evaluate vv once superficial thrombophlebitis resolves      Subjective:      Patient ID: Jose Batista is a 50 y o  female  Pt is new and is a self ref for VV of ble w/ no testing  Pt c/o pain, itching and discoloration in RL w / bulging vein from upper thigh to calf        HPI    Jose Batista is a 50 y o  female with Hx anxiety who presents for evaluation of varicose veins  Patient reports daily symptoms of painful varicose veins for 17 years  A week or 2 ago she developed sudden purple-bruising appearance and and severe bulging of varicosities over the R anterior thigh and lateral leg  The area has been extremely painful in the -mid distal anterior thigh has returned pink/red mid to distal thigh which is painful and tender  There seems to be palpable, firm varicosity under the redness  She is been raising her legs and has taken a couple aspirin  No deep calf pain or tenderness  She has chronic shortness of breath for months but has not discussed this with her family doctor so I advised her to do so  She complains bitterly of daily symptoms of right leg pain, aching and tenderness  She often has an achy and itchy R leg  She wore compression 17 years ago for brief time after pregnancy during which time she had a right ankle superficial thrombophlebitis  She tells me that she is prone to clots and is not allowed to take any kind of hormone replacement or BCP  She thinks she is had blood clots in the past but she is never been on an anticoagulant  She reports that her father has arterial and venous disease  The following portions of the patient's history were reviewed and updated as appropriate: allergies, current medications, past family history, past medical history, past social history, past surgical history and problem list     Review of Systems   Constitutional: Negative  HENT: Negative  Eyes: Negative  Respiratory: Negative  Cardiovascular: Negative  Painful veins in rl   Gastrointestinal: Negative  Endocrine: Negative  Genitourinary: Negative  Musculoskeletal: Negative  Rl pain   Skin: Negative  Allergic/Immunologic: Negative  Neurological: Negative  Psychiatric/Behavioral: Negative          Objective:  /62 (BP Location: Right arm, Patient Position: Sitting, Cuff Size: Standard)   Pulse 102   Ht 5' 5" (1 651 m)   Wt 93 3 kg (205 lb 9 6 oz)   BMI 34 21 kg/m²                 RIGHT anterior thigh is pink with firm vein  There are right lower extremity varicosities over the anterior thigh running laterally down the leg into the calf  The lateral leg veins are tender but not firm  No leg swelling or stigmata of chronic venous stasis changes  Skin healthy and intact otherwise  Calf non-tender  Physical Exam  Vitals and nursing note reviewed  Constitutional:       Appearance: She is well-developed  HENT:      Head: Normocephalic and atraumatic  Eyes:      Pupils: Pupils are equal, round, and reactive to light  Neck:      Thyroid: No thyromegaly  Vascular: No JVD  Trachea: Trachea normal    Cardiovascular:      Rate and Rhythm: Normal rate and regular rhythm  Pulses:           Carotid pulses are 2+ on the right side and 2+ on the left side  Radial pulses are 2+ on the right side and 2+ on the left side  Dorsalis pedis pulses are 2+ on the right side  Heart sounds: Normal heart sounds, S1 normal and S2 normal  No murmur heard  No friction rub  No gallop  Pulmonary:      Effort: Pulmonary effort is normal  No accessory muscle usage or respiratory distress  Breath sounds: Normal breath sounds  No wheezing or rales  Abdominal:      General: Bowel sounds are normal  There is no distension  Palpations: Abdomen is soft  Tenderness: There is no abdominal tenderness  Musculoskeletal:         General: No deformity  Normal range of motion  Cervical back: Neck supple  Right lower leg: No edema  Left lower leg: No edema  Skin:     General: Skin is warm and dry  Findings: No lesion or rash  Nails: There is no clubbing  Neurological:      Mental Status: She is alert and oriented to person, place, and time        Comments: Grossly normal    Psychiatric:         Behavior: Behavior is cooperative  I have reviewed and made appropriate changes to the review of systems input by the medical assistant  Vitals:    09/19/22 1403   BP: 110/62   BP Location: Right arm   Patient Position: Sitting   Cuff Size: Standard   Pulse: 102   Weight: 93 3 kg (205 lb 9 6 oz)   Height: 5' 5" (1 651 m)       Patient Active Problem List   Diagnosis    Anxiety    Keratosis pilaris    Primary insomnia       No past surgical history on file      Family History   Problem Relation Age of Onset    Hypertension Father     Breast cancer Paternal Grandmother     Diabetes Paternal Grandmother     Substance Abuse Neg Hx     Mental illness Neg Hx        Social History     Socioeconomic History    Marital status: /Civil Union     Spouse name: Not on file    Number of children: Not on file    Years of education: Not on file    Highest education level: Not on file   Occupational History    Not on file   Tobacco Use    Smoking status: Never Smoker    Smokeless tobacco: Never Used   Vaping Use    Vaping Use: Every day    Substances: Flavoring   Substance and Sexual Activity    Alcohol use: Yes     Comment: seldom    Drug use: No    Sexual activity: Yes     Partners: Male   Other Topics Concern    Not on file   Social History Narrative    Not on file     Social Determinants of Health     Financial Resource Strain: Not on file   Food Insecurity: Not on file   Transportation Needs: Not on file   Physical Activity: Not on file   Stress: Not on file   Social Connections: Not on file   Intimate Partner Violence: Not on file   Housing Stability: Not on file       Allergies   Allergen Reactions    Iodine I 131 Tositumomab Rash    Doxycycline GI Intolerance and Vomiting    Latex     Other Hives         Current Outpatient Medications:     ALPRAZolam (XANAX) 0 5 mg tablet, Take 1 tablet (0 5 mg total) by mouth 3 (three) times a day as needed for anxiety (Patient not taking: Reported on 9/19/2022), Disp: 90 tablet, Rfl: 5    famotidine (PEPCID) 20 mg tablet, Take 1 tablet (20 mg total) by mouth 2 (two) times a day, Disp: 30 tablet, Rfl: 5    metaxalone (SKELAXIN) 800 mg tablet, Take 1 tablet (800 mg total) by mouth 3 (three) times a day prn (Patient not taking: Reported on 9/19/2022), Disp: 90 tablet, Rfl: 5    phentermine 37 5 MG capsule, Take 1 capsule (37 5 mg total) by mouth every morning (Patient not taking: No sig reported), Disp: 30 capsule, Rfl: 2    QUEtiapine (SEROquel) 25 mg tablet, Take 1 tablet (25 mg total) by mouth daily at bedtime, Disp: 90 tablet, Rfl: 1

## 2022-09-19 NOTE — PATIENT INSTRUCTIONS
SVT  -R Leg redness, pain and tender varicose veins  -up to 1mm wide varicose veins  -conservative measures for right thigh superficial thrombophlebitis  -thigh-high compression, periodic elevation, warm compresses  -if no other reason why he can not take it, gentle/occasional use of NSAIDs (ibuprofen) for pain may be used  -stat venous duplex to evaluate superficial thrombophlebitis  -call 911 or go to the emergency department if you develop chest pain or increased shortness of breath  -follow-up in about 6-8 weeks or sooner, if needed    Varicose veins with pain  -continue with compression, periodic elevation, exercise and weight loss  -we will re-evaluate once superficial thrombophlebitis resolves

## 2022-09-20 ENCOUNTER — APPOINTMENT (INPATIENT)
Dept: NON INVASIVE DIAGNOSTICS | Facility: HOSPITAL | Age: 52
DRG: 176 | End: 2022-09-20
Payer: COMMERCIAL

## 2022-09-20 VITALS
TEMPERATURE: 98.6 F | SYSTOLIC BLOOD PRESSURE: 113 MMHG | HEIGHT: 65 IN | DIASTOLIC BLOOD PRESSURE: 71 MMHG | BODY MASS INDEX: 33.66 KG/M2 | RESPIRATION RATE: 20 BRPM | OXYGEN SATURATION: 96 % | HEART RATE: 65 BPM | WEIGHT: 202 LBS

## 2022-09-20 PROBLEM — G43.909 MIGRAINES: Status: ACTIVE | Noted: 2022-09-20

## 2022-09-20 LAB
ANION GAP SERPL CALCULATED.3IONS-SCNC: 6 MMOL/L (ref 4–13)
AORTIC ROOT: 3 CM
APICAL FOUR CHAMBER EJECTION FRACTION: 63 %
ASCENDING AORTA: 3.1 CM
ATRIAL RATE: 84 BPM
B2 GLYCOPROT1 IGA SERPL IA-ACNC: 1.4
B2 GLYCOPROT1 IGG SERPL IA-ACNC: 0.8
B2 GLYCOPROT1 IGM SERPL IA-ACNC: <2.9
BUN SERPL-MCNC: 11 MG/DL (ref 5–25)
CALCIUM SERPL-MCNC: 7.9 MG/DL (ref 8.4–10.2)
CARDIOLIPIN IGA SER IA-ACNC: 2.3
CARDIOLIPIN IGG SER IA-ACNC: 1
CARDIOLIPIN IGM SER IA-ACNC: 4.6
CHLORIDE SERPL-SCNC: 106 MMOL/L (ref 96–108)
CO2 SERPL-SCNC: 23 MMOL/L (ref 21–32)
CREAT SERPL-MCNC: 0.68 MG/DL (ref 0.6–1.3)
DEPRECATED AT III PPP: 90 % OF NORMAL (ref 92–136)
E WAVE DECELERATION TIME: 183 MS
ERYTHROCYTE [DISTWIDTH] IN BLOOD BY AUTOMATED COUNT: 14.5 % (ref 11.6–15.1)
FLUAV RNA RESP QL NAA+PROBE: NEGATIVE
FLUBV RNA RESP QL NAA+PROBE: NEGATIVE
FRACTIONAL SHORTENING: 53 (ref 28–44)
GFR SERPL CREATININE-BSD FRML MDRD: 102 ML/MIN/1.73SQ M
GLUCOSE SERPL-MCNC: 120 MG/DL (ref 65–140)
HCT VFR BLD AUTO: 32.8 % (ref 34.8–46.1)
HGB BLD-MCNC: 10.1 G/DL (ref 11.5–15.4)
INTERVENTRICULAR SEPTUM IN DIASTOLE (PARASTERNAL SHORT AXIS VIEW): 0.9 CM
INTERVENTRICULAR SEPTUM: 0.9 CM (ref 0.6–1.1)
LAAS-AP2: 12.8 CM2
LAAS-AP4: 18 CM2
LEFT ATRIUM SIZE: 3.2 CM
LEFT INTERNAL DIMENSION IN SYSTOLE: 2.1 CM (ref 2.1–4)
LEFT VENTRICULAR INTERNAL DIMENSION IN DIASTOLE: 4.5 CM (ref 3.5–6)
LEFT VENTRICULAR POSTERIOR WALL IN END DIASTOLE: 1 CM
LEFT VENTRICULAR STROKE VOLUME: 77 ML
LVSV (TEICH): 77 ML
MCH RBC QN AUTO: 26.5 PG (ref 26.8–34.3)
MCHC RBC AUTO-ENTMCNC: 30.8 G/DL (ref 31.4–37.4)
MCV RBC AUTO: 86 FL (ref 82–98)
MV E'TISSUE VEL-SEP: 11 CM/S
MV PEAK A VEL: 0.59 M/S
MV PEAK E VEL: 80 CM/S
MV STENOSIS PRESSURE HALF TIME: 53 MS
MV VALVE AREA P 1/2 METHOD: 4.15
P AXIS: 57 DEGREES
PLATELET # BLD AUTO: 253 THOUSANDS/UL (ref 149–390)
PMV BLD AUTO: 9.5 FL (ref 8.9–12.7)
POTASSIUM SERPL-SCNC: 3.5 MMOL/L (ref 3.5–5.3)
PR INTERVAL: 152 MS
QRS AXIS: -12 DEGREES
QRSD INTERVAL: 70 MS
QT INTERVAL: 376 MS
QTC INTERVAL: 444 MS
RBC # BLD AUTO: 3.81 MILLION/UL (ref 3.81–5.12)
RIGHT ATRIAL 2D VOLUME: 27 ML
RIGHT ATRIUM AREA SYSTOLE A4C: 12.5 CM2
RIGHT VENTRICLE ID DIMENSION: 3.5 CM
RSV RNA RESP QL NAA+PROBE: NEGATIVE
SARS-COV-2 RNA RESP QL NAA+PROBE: NEGATIVE
SL CV LEFT ATRIUM LENGTH A2C: 4.4 CM
SL CV LV EF: 60
SL CV PED ECHO LEFT VENTRICLE DIASTOLIC VOLUME (MOD BIPLANE) 2D: 91 ML
SL CV PED ECHO LEFT VENTRICLE SYSTOLIC VOLUME (MOD BIPLANE) 2D: 15 ML
SODIUM SERPL-SCNC: 135 MMOL/L (ref 135–147)
T WAVE AXIS: 54 DEGREES
TR MAX PG: 24 MMHG
TR PEAK VELOCITY: 2.4 M/S
TRICUSPID VALVE PEAK REGURGITATION VELOCITY: 2.43 M/S
VENTRICULAR RATE: 84 BPM
WBC # BLD AUTO: 6.6 THOUSAND/UL (ref 4.31–10.16)

## 2022-09-20 PROCEDURE — 85027 COMPLETE CBC AUTOMATED: CPT | Performed by: NURSE PRACTITIONER

## 2022-09-20 PROCEDURE — 93010 ELECTROCARDIOGRAM REPORT: CPT | Performed by: INTERNAL MEDICINE

## 2022-09-20 PROCEDURE — 99236 HOSP IP/OBS SAME DATE HI 85: CPT | Performed by: INTERNAL MEDICINE

## 2022-09-20 PROCEDURE — 0241U HB NFCT DS VIR RESP RNA 4 TRGT: CPT | Performed by: NURSE PRACTITIONER

## 2022-09-20 PROCEDURE — 93306 TTE W/DOPPLER COMPLETE: CPT | Performed by: INTERNAL MEDICINE

## 2022-09-20 PROCEDURE — 80048 BASIC METABOLIC PNL TOTAL CA: CPT | Performed by: NURSE PRACTITIONER

## 2022-09-20 PROCEDURE — 93306 TTE W/DOPPLER COMPLETE: CPT

## 2022-09-20 RX ORDER — IBUPROFEN 400 MG/1
400 TABLET ORAL EVERY 8 HOURS SCHEDULED
Status: DISCONTINUED | OUTPATIENT
Start: 2022-09-20 | End: 2022-09-20 | Stop reason: HOSPADM

## 2022-09-20 RX ORDER — OXYCODONE HYDROCHLORIDE 5 MG/1
5 TABLET ORAL EVERY 4 HOURS PRN
Status: DISCONTINUED | OUTPATIENT
Start: 2022-09-20 | End: 2022-09-20 | Stop reason: HOSPADM

## 2022-09-20 RX ORDER — ACETAMINOPHEN 325 MG/1
650 TABLET ORAL EVERY 6 HOURS PRN
Status: DISCONTINUED | OUTPATIENT
Start: 2022-09-20 | End: 2022-09-20 | Stop reason: HOSPADM

## 2022-09-20 RX ORDER — ALPRAZOLAM 0.5 MG/1
0.5 TABLET ORAL 3 TIMES DAILY PRN
Status: DISCONTINUED | OUTPATIENT
Start: 2022-09-20 | End: 2022-09-20 | Stop reason: HOSPADM

## 2022-09-20 RX ORDER — OXYCODONE HYDROCHLORIDE 5 MG/1
2.5 TABLET ORAL EVERY 6 HOURS PRN
Qty: 15 TABLET | Refills: 0 | Status: SHIPPED | OUTPATIENT
Start: 2022-09-20 | End: 2022-09-27 | Stop reason: SDUPTHER

## 2022-09-20 RX ORDER — OXYCODONE HYDROCHLORIDE 5 MG/1
2.5 TABLET ORAL EVERY 4 HOURS PRN
Status: DISCONTINUED | OUTPATIENT
Start: 2022-09-20 | End: 2022-09-20 | Stop reason: HOSPADM

## 2022-09-20 RX ORDER — ACETAMINOPHEN 325 MG/1
650 TABLET ORAL EVERY 6 HOURS PRN
Qty: 30 TABLET | Refills: 0 | Status: SHIPPED | OUTPATIENT
Start: 2022-09-20 | End: 2022-09-27 | Stop reason: SDUPTHER

## 2022-09-20 RX ORDER — ALPRAZOLAM 0.25 MG/1
0.25 TABLET ORAL 3 TIMES DAILY PRN
Status: DISCONTINUED | OUTPATIENT
Start: 2022-09-20 | End: 2022-09-20

## 2022-09-20 RX ORDER — IBUPROFEN 400 MG/1
400 TABLET ORAL EVERY 8 HOURS SCHEDULED
Qty: 30 TABLET | Refills: 0 | Status: CANCELLED | OUTPATIENT
Start: 2022-09-20

## 2022-09-20 RX ADMIN — APIXABAN 10 MG: 5 TABLET, FILM COATED ORAL at 16:07

## 2022-09-20 RX ADMIN — ALPRAZOLAM 0.5 MG: 0.5 TABLET ORAL at 03:46

## 2022-09-20 RX ADMIN — IBUPROFEN 400 MG: 400 TABLET, FILM COATED ORAL at 13:53

## 2022-09-20 RX ADMIN — ENOXAPARIN SODIUM 90 MG: 100 INJECTION SUBCUTANEOUS at 07:50

## 2022-09-20 RX ADMIN — OXYCODONE HYDROCHLORIDE 5 MG: 5 TABLET ORAL at 03:46

## 2022-09-20 RX ADMIN — OXYCODONE HYDROCHLORIDE 5 MG: 5 TABLET ORAL at 08:01

## 2022-09-20 NOTE — UTILIZATION REVIEW
Initial Clinical Review    Admission: Date/Time/Statement:   Admission Orders (From admission, onward)     Ordered        09/19/22 2247  INPATIENT ADMISSION  Once                      Orders Placed This Encounter   Procedures    INPATIENT ADMISSION     Standing Status:   Standing     Number of Occurrences:   1     Order Specific Question:   Level of Care     Answer:   Med Surg [16]     Order Specific Question:   Estimated length of stay     Answer:   More than 2 Midnights     Order Specific Question:   Certification     Answer:   I certify that inpatient services are medically necessary for this patient for a duration of greater than two midnights  See H&P and MD Progress Notes for additional information about the patient's course of treatment  ED Arrival Information     Expected   -    Arrival   9/19/2022 18:58    Acuity   Emergent            Means of arrival   Walk-In    Escorted by   Spouse    Service   Hospitalist    Admission type   Emergency            Arrival complaint   Blood clot/SOB            Chief Complaint   Patient presents with    Medical Problem     Pt reports having a Vascular US today and was diagnosed with a DVT in right leg  Pt reports continuing SOB and CP for last few weeks  Sent in for eval and to start eliquis  Initial Presentation: 50 y o  female  PMH of varicosities, anxiety, migraines, possible blood clot with birth of her son,never on [facility], +COVID 01/2022 who presents to ED from home with acute DVT of RLE on outpatient ultrasound  Notes ongoing R calf pain, swelling for the past 2 weeks, was seen earlier in the day by vascular for possible thrombophlebitis  Pt referred to the ED for further work up after US results were available  Pt also reports occasional SOB ans chest discomfort  On exam,  pt anxious, no edema BLE , normal pulmonary effort   CTA chest shows multiple pulmonary emboli  Pt admitted as inpatient with Pulmonary emboli, RLE DVT   PLan - start SQ therapeutic Lovenox, ECHO, thrombosis panel, vascular consult  Date: 9/20   Day 2:   Vascular surgery consult-acute occlusive DVT in the R soleal vein and superficial thrombophlebitis in the anterior accessory saphenous vein  Patient also with multiple bilateral PE  Pulse exam:Femoral: Right: 2+ , Left: 2+   Popliteal: Right: 1+ , Left: 1+   DP: Right: 1+ , Left: 1+   PT: Right: 1+,  Left: 1+  Plan - Thigh hi compression stockings, AC per internal med, Aqua K pad to R anterior thigh , no vascular surgery intervention    Medicine-Pt has a lot of anxiety in regards to her symptoms  Reports chest heaviness w/ deep breaths , Also reports r calf pain  Lungs are clear bilateral  mild left calf swelling with tenderness, also right thigh varicosities and swelling in area of thrombosed vein  Pt to be transitioned to Eliquis   Patient apparently had trouble obtaining this medication on the outpatient setting due to insurance coverage  She will work with the  in obtaining medication prior to discharge  2D echocardiogram was done which was unremarkable for any right heart strain    EF was 60%       ED Triage Vitals   Temperature Pulse Respirations Blood Pressure SpO2   09/19/22 1911 09/19/22 1911 09/19/22 1911 09/19/22 1911 09/19/22 1911   98 2 °F (36 8 °C) 96 20 120/82 98 %      Temp Source Heart Rate Source Patient Position - Orthostatic VS BP Location FiO2 (%)   09/19/22 1911 09/19/22 1911 09/19/22 1911 09/19/22 1911 --   Oral Monitor Sitting Left arm       Pain Score       09/19/22 1915       6          Wt Readings from Last 1 Encounters:   09/20/22 91 6 kg (202 lb)     Additional Vital Signs:   Date/Time Temp Pulse Resp BP MAP (mmHg) SpO2   09/20/22 0815 -- 65 -- 113/71 -- --   09/20/22 07:21:11 98 6 °F (37 °C) 67 20 113/71 85 96 %   09/20/22 0500 -- -- -- -- -- --   09/20/22 03:36:30 98 7 °F (37 1 °C) -- 22 122/69 87 --   09/20/22 0242 -- 74 20 130/68 -- 100 %   09/19/22 2230 -- 86 22 124/58 83 99 %   09/19/22 2015 -- 83 19 126/60 79 100 %       Pertinent Labs/Diagnostic Test Results:   9/19 ECG-Normal sinus rhythm  Poor anterior R wave progression is noted  9/20 ECHO TTE-    Left Ventricle: Left ventricular cavity size is normal  Wall thickness is normal  The left ventricular ejection fraction is 60%  Systolic function is normal  Wall motion is normal  Diastolic function is normal     Right Ventricle: Right ventricular cavity size is normal  Systolic function is normal     Tricuspid Valve: There is trace regurgitation  There is no indirect evidence of pulmonary hypertension          CTA ED chest PE Study   Final Result by John Munoz MD (09/19 2213)      Multiple pulmonary emboli the largest within the distal right main pulmonary artery extending into right middle and lower lobar branches and pulmonary emboli within segmental branches within bilateral upper and lower lobes  Measured RV/LV ratio is within    normal limits at less than 0 9  I personally discussed this study with Lizbeth Villarreal on 9/19/2022 at 10:12 PM                Workstation performed: [id number]         9/19 Venous duplex RLE-as outpt  RIGHT LOWER LIMB  Evidence of acute occlusive deep vein thrombosis noted in the soleal vein in  the calf  Evidence of superficial thrombophlebitis noted in the anterior accessory  saphenous vein at the proximal and mid thigh, as well as varicosities in the  distal lateral thigh  Doppler evaluation shows a normal response to augmentation maneuvers  Popliteal and anterior tibial arterial Doppler waveforms are triphasic  LEFT LOWER LIMB LIMITED  Evaluation shows no evidence of thrombus in the common femoral vein  Doppler evaluation shows a normal response to augmentation maneuvers        Results from last 7 days   Lab Units 09/20/22  0102   SARS-COV-2  Negative     Results from last 7 days   Lab Units 09/20/22  0610 09/19/22  1925   WBC Thousand/uL 6 60 7 83   HEMOGLOBIN g/dL 10 1* 11 3*   HEMATOCRIT % 32 8* 36 6 PLATELETS Thousands/uL 253 307   NEUTROS ABS Thousands/µL  --  4 95         Results from last 7 days   Lab Units 09/20/22  0610 09/19/22  1925   SODIUM mmol/L 135 139   POTASSIUM mmol/L 3 5 3 8   CHLORIDE mmol/L 106 105   CO2 mmol/L 23 26   ANION GAP mmol/L 6 8   BUN mg/dL 11 16   CREATININE mg/dL 0 68 0 72   EGFR ml/min/1 73sq m 102 97   CALCIUM mg/dL 7 9* 8 9     Results from last 7 days   Lab Units 09/19/22  1925   AST U/L 12*   ALT U/L 7   ALK PHOS U/L 92   TOTAL PROTEIN g/dL 7 3   ALBUMIN g/dL 4 0   TOTAL BILIRUBIN mg/dL 0 41     Results from last 7 days   Lab Units 09/19/22  2319   POC GLUCOSE mg/dl 96     Results from last 7 days   Lab Units 09/20/22  0610 09/19/22  1925   GLUCOSE RANDOM mg/dL 120 86           Results from last 7 days   Lab Units 09/19/22  2242 09/19/22  1925   HS TNI 0HR ng/L  --  <2   HS TNI 2HR ng/L <2  --                Results from last 7 days   Lab Units 09/20/22  0102   INFLUENZA A PCR  Negative   INFLUENZA B PCR  Negative   RSV PCR  Negative         ED Treatment:   Medication Administration from 09/19/2022 1857 to 09/20/2022 0335       Date/Time Order Dose Route Action     09/19/2022 2138 iohexol (OMNIPAQUE) 350 MG/ML injection (SINGLE-DOSE) 100 mL 85 mL Intravenous Given     09/19/2022 2325 enoxaparin (LOVENOX) subcutaneous injection 90 mg 90 mg Subcutaneous Given     09/19/2022 2325 morphine injection 2 mg 2 mg Intravenous Given     09/19/2022 2326 diazepam (VALIUM) injection 2 5 mg 2 5 mg Intravenous Given        History reviewed  No pertinent past medical history    Present on Admission:   Varicose veins of leg with pain, right   Anxiety   Primary insomnia      Admitting Diagnosis: Multiple pulmonary emboli (HCC) [I26 99]  Varicose veins of leg with pain, right [I83 811]  Deep vein thrombosis (DVT) of right lower extremity ([address] ) [I82 401]  Age/Sex: 50 y o  female  Admission Orders:  Scheduled Medications:  enoxaparin, 1 mg/kg, Subcutaneous, Q12H EMILI  ibuprofen, 400 mg, Oral, Q8H [address]      Continuous IV Infusions:     PRN Meds:  acetaminophen, 650 mg, Oral, Q6H PRN  ALPRAZolam, 0 5 mg, Oral, TID PRN x1 9/20  oxyCODONE, 2 5 mg, Oral, Q4H PRN  oxyCODONE, 5 mg, Oral, Q4H PRN x2 9/20    OOB as blue   Thigh hi compression stockings  spirometry       IP CONSULT TO VASCULAR SURGERY  IP CONSULT TO CASE MANAGEMENT    Network Utilization Review Department  ATTENTION: Please call with any questions or concerns to [phone] and carefully listen to the prompts so that you are directed to the right person  All voicemails are confidential   Farrel Bodily all requests for admission clinical reviews, approved or denied determinations and any other requests to dedicated fax number below belonging to the campus where the patient is receiving treatment   List of dedicated fax numbers for the Facilities:  [address] DENIALS (Administrative/Medical Necessity) [phone]   [address] (Maternity/NICU/Pediatrics) [phone]   [address] [place] [address]  [address] Medical [place] [address] [address] [place] [address] [address] [facility] [place] [address] [phone]

## 2022-09-20 NOTE — ASSESSMENT & PLAN NOTE
· Evaluated by vascular as outpatient for superficial thrombophlebitis with concern for DVT  Outpatient doppler revealed acute occlusive DVT in the right soleal vein  Hx RLE varicosities, COVID 1/2022  · Negative cologuard 8/2022  Negative mammo 5/2022  Denies appetite change or unplanned weight loss  Reports chronic LLQ abdominal pain which has been extensively worked up without significant findings  · CTA chest shows multiple bilateral pulmonary emboli  Largest within the distal right main pulmonary artery extending into the right middle and lower lobar branches  RV / LV <0 9  · ECHO unremarkable    Plan:   · Follow up thrombosis panel outpatient  · Consulted CM started eliquis 10 mg BID for 7 days with plan to continue 5 mg BID thereafter

## 2022-09-20 NOTE — DISCHARGE SUMMARY
[facility]  Discharge- [facility] Abraham[facility] 1970, 50 y o  female MRN: [id number]  Unit/Bed#: S -01 Encounter: [id number]  Primary Care Provider: Abby Mahan MD   Date and time admitted to hospital: 9/19/2022  7:42 PM    * Pulmonary emboli [facility])  Assessment & Plan  · Evaluated by vascular as outpatient for superficial thrombophlebitis with concern for DVT  Outpatient doppler revealed acute occlusive DVT in the right soleal vein  Hx RLE varicosities, COVID 1/2022  · Negative cologuard 8/2022  Negative mammo 5/2022  Denies appetite change or unplanned weight loss  Reports chronic LLQ abdominal pain which has been extensively worked up without significant findings  · CTA chest shows multiple bilateral pulmonary emboli  Largest within the distal right main pulmonary artery extending into the right middle and lower lobar branches  RV / LV <0 9  · ECHO unremarkable    Plan:   · Follow up thrombosis panel outpatient  · Consulted CM started eliquis 10 mg BID for 7 days with plan to continue 5 mg BID thereafter  Migraines  Assessment & Plan  · Reports taking motrin 800 mg when she has migraines  Advise she should limit NSAIDs as able now with blood thinner on board       Varicose veins of leg with pain, right  Assessment & Plan  · Risk factor for primary problem  · A/c as above    Primary insomnia  Assessment & Plan  · Continue xanax HS PRN    Anxiety  Assessment & Plan  · Continue home xanax HS PRN      Medical Problems             Resolved Problems  Date Reviewed: 9/19/2022   None               Discharging Resident: Kristy Hooks MD  Discharging Attending: Honey Park MD  PCP: Abby Mahan MD  Admission Date:   Admission Orders (From admission, onward)     Ordered        09/19/22 2247  [facility] [facility] [place]  Once                      Discharge Date: 09/20/22    Consultations During Hospital Stay:  · vascular    Procedures Performed:   · CT PE study     Significant Findings / Test Results:   · Outpatient doppler revealed acute occlusive DVT in the right soleal vein  · CTA chest shows multiple bilateral pulmonary emboli  Largest within the distal right main pulmonary artery extending into the right middle and lower lobar branches  RV / LV <0 9      Incidental Findings:   · ECHO unremarkable    Test Results Pending at Discharge (will require follow up): · Thrombosis panel     Outpatient Tests Requested:  · None    Complications:  None    Reason for Admission: DVT/PE    Hospital Course:   Whitney Koyanagi is a 50 y o  female patient who originally presented to the hospital on 9/19/2022 due to acute DVT of RLE on outpatient ultrasound  She was seen earlier that day by vascular for possible thrombophlebitis  She was referred to the ED for further work up after US results were available  She noted ongoing R calf pain, swelling for the past 2 weeks prior to admission  She also reports occasional shortness of breath and chest discomfort  PE study in the ED showed multiple bilateral pulmonary emboli as above  She was started on therapeutic lovenox and transitioned to eliquis following price check per case management consult  IS and compression stockigns provided  Patient instructed to follow up with primary care provider within one week of hospital discharge  History of prior DVT noted in chart  Please see above list of diagnoses and related plan for additional information  Condition at Discharge: fair    Discharge Day Visit / Exam:   * Please refer to separate progress note for these details *    Discussion with Family: Updated  ( and father) at bedside  Discharge instructions/Information to patient and family:   See after visit summary for information provided to patient and family  Provisions for Follow-Up Care:  See after visit summary for information related to follow-up care and any pertinent home health orders  Disposition:   Home    Planned Readmission: None    Discharge Medications:  See after visit summary for reconciled discharge medications provided to patient and/or family        **Please Note: This note may have been constructed using a voice recognition system**

## 2022-09-20 NOTE — CONSULTS
Inpatient consult to Vascular Surgery  Consult performed by: Ronal Lieberman PA-C  Consult ordered by: Smiley Wallace, [facility]           Consultation - Vascular Surgery   Andrews Webster 50 y o  female MRN: [id number]  Unit/Bed#: S -01 Encounter: [id number]        Assessment  Patient is a 50year old female with RLE acute occlusive DVT in the soleal vein and superficial thrombophlebitis in the anterior accessory saphenous vein  Patient also with multiple bilateral PE     RLE Duplex: Evidence of acute occlusive deep vein thrombosis noted in the soleal vein in  the calf  Evidence of superficial thrombophlebitis noted in the anterior accessory  saphenous vein at the proximal and mid thigh, as well as varicosities in the  distal lateral thigh  Plan   - anticoagulation per primary team   - Thigh high compression stockings b/l LE   - aqua K pad to right anterior thigh for superficial thrombophlebitis   - NSAIDs for superficial thrombophlebitis   - no vascular surgery surgical intervention    - follow-up outpatient with vascular surgery   - rest of care per primary team              _______________________________________________________________  Physician Requesting Consult: Alice Cochran MD    HPI: Andrews Webster is a 50y o  year old female who presents with right anterior thigh pain  Patient was being evaluated at the outpatient vascular center for varicose vein pain in her RLE  Patient had history of DVT in that extremity about 15 years ago during pregnancy  Patient denies any DVT or PE since that time  Patient states that her pain started in her right lower leg and then she noticed redness and pain in her right anterior thigh yesterday  Patient had outpatient RLE duplex performed and was found to have a DVT  Patient was then evaluated in the ED and CTA showed multiple bilateral PE    Patient states her right lower leg pain is resolved now, but she still has pain in her right anterior thigh     Historical Information   History reviewed  No pertinent past medical history  History reviewed  No pertinent surgical history  Social History   Social History     Substance and Sexual Activity   Alcohol Use Yes    Alcohol/week: 4 0 standard drinks    Types: 2 Glasses of wine, 1 Cans of beer, 1 Shots of liquor per week    Comment: seldom     Social History     Substance and Sexual Activity   Drug Use No     Social History     Tobacco Use   Smoking Status Former Smoker   Smokeless Tobacco Never Used     Family History:   Family History   Problem Relation Age of Onset    Hypertension Father     Breast cancer Paternal Grandmother     Diabetes Paternal Grandmother     Substance Abuse Neg Hx     Mental illness Neg Hx    }    Meds/Allergies   Home meds:   Prior to Admission medications    Medication Sig Start Date End Date Taking?  Authorizing Provider   ALPRAZolam Stellamolly Tamayo) 0 5 mg tablet Take 1 tablet (0 5 mg total) by mouth 3 (three) times a day as needed for anxiety  Patient not taking: Reported on 9/19/2022 8/1/22   Baldomero Sanchez MD   [facility]) 800 mg tablet Take 1 tablet (800 mg total) by mouth 3 (three) times a day prn  Patient not taking: Reported on 9/19/2022 8/1/22   Baldomero Sanchez MD   phentermine 37 5 MG capsule Take 1 capsule (37 5 mg total) by mouth every morning  Patient not taking: No sig reported 7/28/21   Baldomero Sanchez MD   famotidine (PEPCID) 20 mg tablet Take 1 tablet (20 mg total) by mouth 2 (two) times a day 8/3/22 9/20/22  Baldomero Sanchez MD   QUEtiapine (SEROquel) 25 mg tablet Take 1 tablet (25 mg total) by mouth daily at bedtime 8/3/22 9/20/22  Baldomero Sanchez MD     Scheduled Meds:  Current Facility-Administered Medications   Medication Dose Route Frequency Provider Last Rate    acetaminophen  650 mg Oral Q6H PRN EMA Garcia      ALPRAZolam  0 5 mg Oral TID PRN EMA Garcia      enoxaparin  1 mg/kg Subcutaneous Q12H 66 Olivia Shirley MD      oxyCODONE  2 5 mg Oral Q4H PRN Jose F Frizzle, CRNP      oxyCODONE  5 mg Oral Q4H PRN Jose F Frizzle, CRNP       Continuous Infusions:   PRN Meds:    acetaminophen    ALPRAZolam    oxyCODONE    oxyCODONE    ALLERGIES:   Allergies   Allergen Reactions    Iodine I 131 Tositumomab Rash     OK for CT per Dr Shavon Lee Doxycycline GI Intolerance and Vomiting    Latex     Other Hives       Review of Systems:  General: negative  Cardiovascular: positive for - chest pain  Respiratory: positive for - shortness of breath  Gastrointestinal: no abdominal pain, change in bowel habits, or black or bloody stools  Genitourinary: no dysuria, trouble voiding, or hematuria  Musculoskeletal: positive for - pain in right anterior thigh and swelling in right anterior thigh  Neurological: no TIA or stroke symptoms  Hematological and Lymphatic: negative  Dermatological : negative  Psychological: negative  Ophthalmic: negative  ENT: negative      Objective   Vitals:  Blood pressure 113/71, pulse 65, temperature 98 6 °F (37 °C), temperature source Oral, resp  rate 20, height 5' 5" (1 651 m), weight 91 6 kg (202 lb), last menstrual period 09/05/2022, SpO2 96 %  Body mass index is 33 61 kg/m²          I/Os:  I/O     None          Invasive Lines/Tubes:  Invasive Devices  Report    Peripheral Intravenous Line  Duration           Peripheral IV 09/19/22 Right Antecubital <1 day                Physical Exam      Pulse exam:  Femoral: Right: 2+                   Left: 2+  Popliteal: Right: 1+                    Left: 1+  DP: Right: 1+          Left: 1+  PT: Right: 1+         Left: 1+    Lab Results and Cultures:   COVID:   Last COVID19 Screening Values     None         CBC:   Results from last 7 days   Lab Units 09/20/22  0610 09/19/22  1925   WBC Thousand/uL 6 60 7 83   HEMOGLOBIN g/dL 10 1* 11 3*   HEMATOCRIT % 32 8* 36 6   PLATELETS Thousands/uL 253 307     BMP/CMP:  Results from last 7 days   Lab Units 09/20/22  0610 09/19/22 1925   POTASSIUM mmol/L 3 5 3 8 CHLORIDE mmol/L 106 105   CO2 mmol/L 23 26   BUN mg/dL 11 16   CREATININE mg/dL 0 68 0 72   CALCIUM mg/dL 7 9* 8 9     Coags:     Lipid panel:     HgbA1c: No results found for: HGBA1C    Urinalysis: No results found for: COLORU, CLARITYU, SPECGRAV, PHUR, LEUKOCYTESUR, NITRITE, PROTEINUA, GLUCOSEU, KETONESU, BILIRUBINUR, BLOODU,   Urine Culture: No results found for: URINECX  Wound Culure: No results found for: WOUNDCULT  Blood Culture: No results found for: BLOODCX    Imaging Studies: I have personally reviewed pertinent reports         Code Status: Level 1 - Full Code  Advance Directive and Living Will:      Power of :    POLST:          Taryn Moncada PA-C  9/20/2022

## 2022-09-20 NOTE — H&P
[facility]  H&P- [facility] 1970, 50 y o  female MRN: [id number]  Unit/Bed#: ED-24 Encounter: [id number]  Primary Care Provider: Roberta Rousseau MD   Date and time admitted to hospital: 9/19/2022  7:42 PM    * Pulmonary emboli [facility])  Assessment & Plan  · Evaluated by vascular as outpatient for superficial thrombophlebitis with concern for DVT  Outpatient doppler revealed acute occlusive DVT in the right soleal vein  Hx RLE varicosities, COVID 1/2022  · Negative cologuard 8/2022  Negative mammo 5/2022  Denies appetite change or unplanned weight loss  Reports chronic LLQ abdominal pain which has been extensively worked up without significant findings  · CTA chest shows multiple bilateral pulmonary emboli  Largest within the distal right main pulmonary artery extending into the right middle and lower lobar branches  RV / LV <0 9  · Check echo   · Follow up thrombosis panel  · Start therapeutic lovenox and consult CM  · Vascular consulted     Varicose veins of leg with pain, right  Assessment & Plan  · Risk factor for primary problem  · A/c as above    Migraines  Assessment & Plan  · Reports taking motrin 800 mg when she has migraines  Advise she should limit NSAIDs as able now with blood thinner on board  Primary insomnia  Assessment & Plan  · Continue xanax as needed    Anxiety  Assessment & Plan  · Required dose of valium in ED   · Continue xanax PRN    VTE Pharmacologic Prophylaxis: VTE Score: 5 High Risk (Score >/= 5) - Pharmacological DVT Prophylaxis Ordered: enoxaparin (Lovenox)  Sequential Compression Devices Ordered  Code Status: No Order full  Discussion with family: fam at bedside  Anticipated Length of Stay: Patient will be admitted on an inpatient basis with an anticipated length of stay of greater than 2 midnights secondary to anticoagulation  new pe dvt       Total Time for Visit, including Counseling / Coordination of Care: 70 minutes Greater than 50% of this total time spent on direct patient counseling and coordination of care  Chief Complaint: acute DVT     History of Present Illness:  Heidi Armendariz is a 50 y o  female with a PMH of varicosities, anxiety, migraines who presents with acute DVT of RLE on outpatient ultrasound  She was seen earlier in the day by vascular for possible thrombophlebitis  She was referred to the ED for further work up after US results were available  She notes ongoing R calf pain, swelling for the past 2 weeks     She also notes occasional shortness of breath and chest discomfort  She underwent PE study as above  She was started on therapeutic lovenox  CM is consulted for pricing  Reports possible blood clot >15 years ago with the birth of her son  Was never started on anticoagulation  Denies any known history of clotting disorder within her family  Patient's father with what sounds like significant PAD, CAD  Review of Systems:  Review of Systems   Respiratory: Positive for shortness of breath  Cardiovascular: Positive for chest pain  Musculoskeletal:        Right leg pain   All other systems reviewed and are negative  Past Medical and Surgical History:   History reviewed  No pertinent past medical history  History reviewed  No pertinent surgical history  Meds/Allergies:  Prior to Admission medications    Medication Sig Start Date End Date Taking?  Authorizing Provider   ALPVALARIEZoliberty Navarro 0 5 mg tablet Take 1 tablet (0 5 mg total) by mouth 3 (three) times a day as needed for anxiety  Patient not taking: Reported on 9/19/2022 8/1/22   Juan Taylor MD   famotidine (PEPCID) 20 mg tablet Take 1 tablet (20 mg total) by mouth 2 (two) times a day 8/3/22   Juan Taylor MD   metaxalone [facility]) 800 mg tablet Take 1 tablet (800 mg total) by mouth 3 (three) times a day prn  Patient not taking: Reported on 9/19/2022 8/1/22   Juan Taylor MD   phentermine 37 5 MG capsule Take 1 capsule (37 5 mg total) by mouth every morning  Patient not taking: No sig reported 7/28/21   Nicole Valenzuela MD   QUEtiapine (SEROquel) 25 mg tablet Take 1 tablet (25 mg total) by mouth daily at bedtime 8/3/22   Nicole Valenzuela MD     I have reviewed home medications with a medical source (PCP, Pharmacy, other)  Allergies: Allergies   Allergen Reactions    Iodine I 131 Tositumomab Rash     OK for CT per Dr Masood Stone Doxycycline GI Intolerance and Vomiting    Latex     Other Hives       Social History:  Marital Status: /Civil Union   Occupation:   Patient Pre-hospital Living Situation: Home  Patient Pre-hospital Level of Mobility: walks  Patient Pre-hospital Diet Restrictions:   Substance Use History:   Social History     Substance and Sexual Activity   Alcohol Use Yes    Alcohol/week: 4 0 standard drinks    Types: 2 Glasses of wine, 1 Cans of beer, 1 Shots of liquor per week    Comment: seldom     Social History     Tobacco Use   Smoking Status Former Smoker   Smokeless Tobacco Never Used     Social History     Substance and Sexual Activity   Drug Use No       Family History:  Family History   Problem Relation Age of Onset    Hypertension Father     Breast cancer Paternal Grandmother     Diabetes Paternal Grandmother     Substance Abuse Neg Hx     Mental illness Neg Hx        Physical Exam:     Vitals:   Blood Pressure: 124/58 (09/19/22 2230)  Pulse: 86 (09/19/22 2230)  Temperature: 98 2 °F (36 8 °C) (09/19/22 1911)  Temp Source: Oral (09/19/22 1911)  Respirations: 22 (09/19/22 2230)  Weight - Scale: 92 kg (202 lb 13 2 oz) (09/19/22 1915)  SpO2: 99 % (09/19/22 2230)    Physical Exam  Constitutional:       General: She is not in acute distress  Appearance: Normal appearance  She is not ill-appearing  HENT:      Head: Normocephalic and atraumatic  Right Ear: External ear normal       Left Ear: External ear normal       Mouth/Throat:      Pharynx: Oropharynx is clear     Eyes:      General: No scleral icterus  Extraocular Movements: Extraocular movements intact  Conjunctiva/sclera: Conjunctivae normal    Cardiovascular:      Rate and Rhythm: Normal rate and regular rhythm  Pulses: Normal pulses  Heart sounds: Normal heart sounds  Pulmonary:      Effort: Pulmonary effort is normal  No respiratory distress  Breath sounds: No wheezing, rhonchi or rales  Chest:      Chest wall: No tenderness  Abdominal:      General: Bowel sounds are normal       Palpations: Abdomen is soft  Musculoskeletal:         General: Normal range of motion  Cervical back: Normal range of motion  No rigidity or tenderness  Right lower leg: No edema  Left lower leg: No edema  Skin:     General: Skin is warm  Capillary Refill: Capillary refill takes less than 2 seconds  Neurological:      General: No focal deficit present  Mental Status: She is alert and oriented to person, place, and time  Psychiatric:         Mood and Affect: Mood is anxious           Speech: Speech normal          Behavior: Behavior normal           Additional Data:     Lab Results:  Results from last 7 days   Lab Units 09/19/22  1925   WBC Thousand/uL 7 83   HEMOGLOBIN g/dL 11 3*   HEMATOCRIT % 36 6   PLATELETS Thousands/uL 307   NEUTROS PCT % 64   LYMPHS PCT % 23   MONOS PCT % 9   EOS PCT % 3     Results from last 7 days   Lab Units 09/19/22  1925   SODIUM mmol/L 139   POTASSIUM mmol/L 3 8   CHLORIDE mmol/L 105   CO2 mmol/L 26   BUN mg/dL 16   CREATININE mg/dL 0 72   ANION GAP mmol/L 8   CALCIUM mg/dL 8 9   ALBUMIN g/dL 4 0   TOTAL BILIRUBIN mg/dL 0 41   ALK PHOS U/L 92   ALT U/L 7   AST U/L 12*   GLUCOSE RANDOM mg/dL 86         Results from last 7 days   Lab Units 09/19/22  2319   POC GLUCOSE mg/dl 96               Imaging: Reviewed radiology reports from this admission including: chest CT scan and ultrasound(s)  CTA ED chest PE Study   Final Result by Maylin Tucker MD (09/19 2213)      Multiple pulmonary emboli the largest within the distal right main pulmonary artery extending into right middle and lower lobar branches and pulmonary emboli within segmental branches within bilateral upper and lower lobes  Measured RV/LV ratio is within    normal limits at less than 0 9  I personally discussed this study with Crecencio Denver on 9/19/2022 at 10:12 PM                Workstation performed: [id number]             EKG and Other Studies Reviewed on Admission:   · EKG: NSR  HR 84     ** Please Note: This note has been constructed using a voice recognition system   **

## 2022-09-20 NOTE — ASSESSMENT & PLAN NOTE
· Evaluated by vascular as outpatient for superficial thrombophlebitis with concern for DVT  Outpatient doppler revealed acute occlusive DVT in the right soleal vein  Hx RLE varicosities, COVID 1/2022  · Negative cologuard 8/2022  Negative mammo 5/2022  Denies appetite change or unplanned weight loss  Reports chronic LLQ abdominal pain which has been extensively worked up without significant findings  · CTA chest shows multiple bilateral pulmonary emboli  Largest within the distal right main pulmonary artery extending into the right middle and lower lobar branches    RV / LV <0 9  · Check echo   · Follow up thrombosis panel  · Start therapeutic lovenox and consult CM  · Vascular consulted

## 2022-09-20 NOTE — ED PROVIDER NOTES
History  Chief Complaint   Patient presents with    Medical Problem     Pt reports having a Vascular US today and was diagnosed with a DVT in right leg  Pt reports continuing SOB and CP for last few weeks  Sent in for eval and to start eliquis  HPI    52F presenting to ED for anticoagulation due to newly diagnosed (+) DVT in right leg today  Patient also reports increasing shortness of breath over the past several weeks  Past medical history significant for right lower extremity varicose veins  Patient reports that she had 2 weeks of increased pain, redness, and firmness in the veins over her right thigh and lateral calf as well as increasing exertional dyspnea  Patient spoke to her physician who encouraged her to go to the emergency department, however patient decided that she wanted to wait to be seen by vascular ultrasound  Patient had been taking aspirin for the pain  No recent fever, nausea, vomiting, abdominal pain, urinary symptoms, changes in stool  Prior to Admission Medications   Prescriptions Last Dose Informant Patient Reported? Taking? ALPRAZolam (XANAX) 0 5 mg tablet  Self No No   Sig: Take 1 tablet (0 5 mg total) by mouth 3 (three) times a day as needed for anxiety   Patient not taking: Reported on 9/19/2022   QUEtiapine (SEROquel) 25 mg tablet  Self No No   Sig: Take 1 tablet (25 mg total) by mouth daily at bedtime   famotidine (PEPCID) 20 mg tablet  Self No No   Sig: Take 1 tablet (20 mg total) by mouth 2 (two) times a day   metaxalone (SKELAXIN) 800 mg tablet  Self No No   Sig: Take 1 tablet (800 mg total) by mouth 3 (three) times a day prn   Patient not taking: Reported on 9/19/2022   phentermine 37 5 MG capsule  Self No No   Sig: Take 1 capsule (37 5 mg total) by mouth every morning   Patient not taking: No sig reported      Facility-Administered Medications: None       History reviewed  No pertinent past medical history  History reviewed   No pertinent surgical history  Family History   Problem Relation Age of Onset    Hypertension Father     Breast cancer Paternal Grandmother     Diabetes Paternal Grandmother     Substance Abuse Neg Hx     Mental illness Neg Hx      I have reviewed and agree with the history as documented  E-Cigarette/Vaping    E-Cigarette Use Current Every Day User      E-Cigarette/Vaping Substances    THC No     CBD No     Flavoring Yes      Social History     Tobacco Use    Smoking status: Former Smoker    Smokeless tobacco: Never Used   Vaping Use    Vaping Use: Every day    Substances: Flavoring   Substance Use Topics    Alcohol use: Yes     Alcohol/week: 4 0 standard drinks     Types: 2 Glasses of wine, 1 Cans of beer, 1 Shots of liquor per week     Comment: seldom    Drug use: No       Review of Systems   Constitutional: Negative for chills and fever  Respiratory: Positive for shortness of breath  Negative for apnea, cough, choking, chest tightness, wheezing and stridor  Cardiovascular: Negative for chest pain and leg swelling  Gastrointestinal: Negative for abdominal distention, abdominal pain, constipation, diarrhea, nausea, rectal pain and vomiting  Genitourinary: Negative for dysuria and hematuria  Musculoskeletal: Negative for back pain, gait problem and neck pain  Leg swelling, leg pain   Skin: Positive for rash (Redness along leg swelling)  Negative for color change, pallor and wound  Neurological: Negative for dizziness, tremors, seizures, syncope, facial asymmetry, speech difficulty, weakness, light-headedness, numbness and headaches  Physical Exam  Physical Exam  Vitals and nursing note reviewed  Constitutional:       General: She is not in acute distress  Appearance: She is well-developed  She is not ill-appearing, toxic-appearing or diaphoretic  HENT:      Head: Normocephalic and atraumatic        Right Ear: External ear normal       Left Ear: External ear normal       Nose: Nose normal       Mouth/Throat:      Mouth: Mucous membranes are moist    Eyes:      General:         Right eye: No discharge  Left eye: No discharge  Extraocular Movements: Extraocular movements intact  Conjunctiva/sclera: Conjunctivae normal       Pupils: Pupils are equal, round, and reactive to light  Cardiovascular:      Rate and Rhythm: Normal rate and regular rhythm  Heart sounds: Normal heart sounds  No murmur heard  No friction rub  No gallop  Pulmonary:      Effort: Respiratory distress (Patient has very mild respiratory distress on exertion only) present  Breath sounds: Normal breath sounds  No stridor  No wheezing or rales  Chest:      Chest wall: No tenderness  Abdominal:      General: Bowel sounds are normal  There is no distension  Palpations: Abdomen is soft  There is no mass  Tenderness: There is no abdominal tenderness  There is no guarding or rebound  Hernia: No hernia is present  Musculoskeletal:         General: No tenderness or deformity  Normal range of motion  Cervical back: Normal range of motion and neck supple  Right lower leg: No edema  Left lower leg: No edema  Comments: Right lower extremity swelling from calf to thigh with some redness and firmness to touch, tender   Skin:     General: Skin is warm and dry  Capillary Refill: Capillary refill takes less than 2 seconds  Neurological:      Mental Status: She is alert and oriented to person, place, and time     Psychiatric:         Mood and Affect: Mood normal          Vital Signs  ED Triage Vitals   Temperature Pulse Respirations Blood Pressure SpO2   09/19/22 1911 09/19/22 1911 09/19/22 1911 09/19/22 1911 09/19/22 1911   98 2 °F (36 8 °C) 96 20 120/82 98 %      Temp Source Heart Rate Source Patient Position - Orthostatic VS BP Location FiO2 (%)   09/19/22 1911 09/19/22 1911 09/19/22 1911 09/19/22 1911 --   Oral Monitor Sitting Left arm       Pain Score 09/19/22 1915       6           Vitals:    09/19/22 1911 09/19/22 2015   BP: 120/82 126/60   Pulse: 96 83   Patient Position - Orthostatic VS: Sitting          Visual Acuity      ED Medications  Medications   enoxaparin (LOVENOX) subcutaneous injection 90 mg (has no administration in time range)   morphine injection 2 mg (has no administration in time range)   diazepam (VALIUM) injection 2 5 mg (has no administration in time range)   iohexol (OMNIPAQUE) 350 MG/ML injection (SINGLE-DOSE) 100 mL (85 mL Intravenous Given 9/19/22 2138)       Diagnostic Studies  Results Reviewed     Procedure Component Value Units Date/Time    HS Troponin I 2hr [[id number]] Collected: 09/19/22 2242    Lab Status: No result Specimen: Blood from Arm, Right     Antithrombin III Activity [[id number]] Collected: 09/19/22 2239    Lab Status: No result Specimen: Blood from Arm, Right     Cardiolipin antibody [[id number]] Collected: 09/19/22 2239    Lab Status: No result Specimen: Blood from Arm, Right     Factor 5 leiden [[id number]] Collected: 09/19/22 2239    Lab Status: No result Specimen: Blood from Arm, Right     Lupus anticoagulant [[id number]] Collected: 09/19/22 2239    Lab Status: No result Specimen: Blood from Arm, Right     Protein C activity [[id number]] Collected: 09/19/22 2239    Lab Status: No result Specimen: Blood from Arm, Right     Protein S activity [[id number]] Collected: 09/19/22 2239    Lab Status: No result Specimen: Blood from Arm, Right     Protein S Antigen, Total & Free [[id number]] Collected: 09/19/22 2239    Lab Status: No result Specimen: Blood from Arm, Right     Prothrombin gene mutation [[id number]] Collected: 09/19/22 2239    Lab Status: No result Specimen: Blood from Arm, Right     Beta-2 glycoprotein antibodies [[id number]] Collected: 09/19/22 2239    Lab Status: No result Specimen: Blood from Arm, Right     Trauma tubes on hold [[id number]] Collected: 09/19/22 1925    Lab Status: Final result Specimen: Blood from Arm, Right Updated: 09/19/22 2103    Narrative: The following orders were created for panel order Trauma tubes on hold  Procedure                               Abnormality         Status                     ---------                               -----------         ------                     [place] Fraction Top on QZZF[[id number]]                           Final result               Gold top on VXSV[[id number]]                                 Final result               Grey top tube on ABLY[[id number]]                            Final result                 Please view results for these tests on the individual orders      HS Troponin I 4hr [[id number]]     Lab Status: No result Specimen: Blood     HS Troponin 0hr (reflex protocol) [[id number]]  (Normal) Collected: 09/19/22 1925    Lab Status: Final result Specimen: Blood from Arm, Right Updated: 09/19/22 1956     hs TnI 0hr <2 ng/L     Comprehensive metabolic panel [[id number]]  (Abnormal) Collected: 09/19/22 1925    Lab Status: Final result Specimen: Blood from Arm, Right Updated: 09/19/22 1950     Sodium 139 mmol/L      Potassium 3 8 mmol/L      Chloride 105 mmol/L      CO2 26 mmol/L      ANION GAP 8 mmol/L      BUN 16 mg/dL      Creatinine 0 72 mg/dL      Glucose 86 mg/dL      Calcium 8 9 mg/dL      AST 12 U/L      ALT 7 U/L      Alkaline Phosphatase 92 U/L      Total Protein 7 3 g/dL      Albumin 4 0 g/dL      Total Bilirubin 0 41 mg/dL      eGFR 97 ml/min/1 73sq m     Narrative:      Tai guidelines for Chronic Kidney Disease (CKD):     Stage 1 with normal or high GFR (GFR > 90 mL/min/1 73 square meters)    Stage 2 Mild CKD (GFR = 60-89 mL/min/1 73 square meters)    Stage 3A Moderate CKD (GFR = 45-59 mL/min/1 73 square meters)    Stage 3B Moderate CKD (GFR = 30-44 mL/min/1 73 square meters)    Stage 4 Severe CKD (GFR = 15-29 mL/min/1 73 square meters)    Stage 5 End Stage CKD (GFR <15 mL/min/1 73 square meters)  Note: GFR calculation is accurate only with a steady state creatinine    CBC and differential [[id number]]  (Abnormal) Collected: 09/19/22 1925    Lab Status: Final result Specimen: Blood from Arm, Right Updated: 09/19/22 1933     WBC 7 83 Thousand/uL      RBC 4 25 Million/uL      Hemoglobin 11 3 g/dL      Hematocrit 36 6 %      MCV 86 fL      MCH 26 6 pg      MCHC 30 9 g/dL      RDW 14 4 %      MPV 9 5 fL      Platelets 264 Thousands/uL      nRBC 0 /100 WBCs      Neutrophils Relative 64 %      Immat GRANS % 0 %      Lymphocytes Relative 23 %      Monocytes Relative 9 %      Eosinophils Relative 3 %      Basophils Relative 1 %      Neutrophils Absolute 4 95 Thousands/µL      Immature Grans Absolute 0 02 Thousand/uL      Lymphocytes Absolute 1 83 Thousands/µL      Monocytes Absolute 0 69 Thousand/µL      Eosinophils Absolute 0 24 Thousand/µL      Basophils Absolute 0 10 Thousands/µL                  CTA ED chest PE Study   Final Result by Arlin Dailey MD (09/19 2213)      Multiple pulmonary emboli the largest within the distal right main pulmonary artery extending into right middle and lower lobar branches and pulmonary emboli within segmental branches within bilateral upper and lower lobes  Measured RV/LV ratio is within    normal limits at less than 0 9  I personally discussed this study with Francesco Velasquez on 9/19/2022 at 10:12 PM                Workstation performed: [id number]                    Procedures  Procedures         ED Course  ED Course as of 09/19/22 2249   Mon Sep 19, 2022   2003 52F presenting with SOB, (+) DVT study just today  Vital signs on arrival within normal limits  Physical exam is remarkable for a woman with swollen right lower extremity  No respiratory distress  Differential diagnosis includes PE, ACS, pneumothorax, pneumonia, metabolic disturbance  Imaging ordered as triage lab work has already been obtained and resulted which is grossly unremarkable     2005 RIGHT LOWER LIMB  Evidence of acute occlusive deep vein thrombosis noted in the soleal vein in  the calf  Evidence of superficial thrombophlebitis noted in the anterior accessory  saphenous vein at the proximal and mid thigh, as well as varicosities in the  distal lateral thigh  Doppler evaluation shows a normal response to augmentation maneuvers  Popliteal and anterior tibial arterial Doppler waveforms are triphasic  LEFT LOWER LIMB LIMITED  Evaluation shows no evidence of thrombus in the common femoral vein  Doppler evaluation shows a normal response to augmentation maneuvers  2019 hs TnI 0hr: <2   2028 ECG 12 lead  Normal sinus rhythm 84, QRS 70, , no ST elevation or depression, no ectopy, no terminal R  Overall impression:  No STEMI  2032 Patient is declining analgesia at this time  2211   Multiple pulmonary emboli the largest within the distal right main pulmonary artery extending into right middle and lower lobar branches and pulmonary emboli within segmental branches within bilateral upper and lower lobes  Measured RV/LV ratio is within   normal limits at less than 0 9      2247 Patient was notified of CT findings  At this time, patient is requesting medicine for anxiety and pain  Valium and morphine was ordered  Case was discussed with General Medicine for admission  Thrombosis panel was ordered as well as Lovenox injection  Patient remains comfortable at rest at this time  No respiratory distress                                               MDM    Disposition  Final diagnoses:   Multiple pulmonary emboli ([address] )   Deep vein thrombosis (DVT) of right lower extremity ([address] )     Time reflects when diagnosis was documented in both MDM as applicable and the Disposition within this note     Time User Action Codes Description Comment    9/19/2022 10:46 PM Arpan Chung Add [I26 99] Multiple pulmonary emboli ([address] )     9/19/2022 10:47 PM Arpan Chung Add [I82 401] Deep vein thrombosis (DVT) of right lower extremity ([address] ) ED Disposition     ED Disposition   Admit    Condition   Stable    Date/Time   Mon Sep 19, 2022 10:46 PM    Comment   Case was discussed with AMANDA Rosas and the patient's admission status was agreed to be Admission Status: inpatient status to the service of Dr Corona Tavarez   Follow-up Information    None         Patient's Medications   Discharge Prescriptions    No medications on file       No discharge procedures on file      PDMP Review       Value Time User    PDMP Reviewed  Yes 12/8/2020 11:39 AM EMA Díaz          ED Provider  Electronically Signed by           Giovanna Alatorre MD  09/19/22 2360

## 2022-09-20 NOTE — ASSESSMENT & PLAN NOTE
· Reports taking motrin 800 mg when she has migraines  Advise she should limit NSAIDs as able now with blood thinner on board

## 2022-09-20 NOTE — DISCHARGE INSTR - AVS FIRST PAGE
Dear Sapphire Doty,     It was our pleasure to care for you here at [facility]  It is our hope that we were always able to exceed the expected standards for your care during your stay  You were hospitalized due to DVT and pulmonary embolism  You were cared for on the 4th floor by Parker Dubois MD under the service of Vinh Wong MD with the [facility] who covers for your primary care physician (PCP), Manav Perry MD, while you were hospitalized  If you have any questions or concerns related to this hospitalization, you may contact us at [phone]  For follow up as well as any medication refills, we recommend that you follow up with your primary care physician  A registered nurse will reach out to you by phone within a few days after your discharge to answer any additional questions that you may have after going home  However, at this time we provide for you here, the most important instructions / recommendations at discharge:     Notable Medication Adjustments -   Please begin taking Eliquis 5mg tabs at a dose of 10 mg (two pills) twice daily for a duration of 7 days  Continue Eliquis 5 mg (one pill) twice daily thereafter   Please continue taking Xanax as needed for sleep   Please take oxycodone 2 5 mg as needed for severe pain   Testing Required after Discharge -   N/A   Important follow up information -   Please follow up with your primary doctor within one week of discharge from hospital  Other Instructions -   Please continue use of compression stockings and incentive spirometry  Spirometry may be continued until breathing symptoms resolve  Please review this entire after visit summary as additional general instructions including medication list, appointments, activity, diet, any pertinent wound care, and other additional recommendations from your care team that may be provided for you        Sincerely,     Parker Dubois, MD

## 2022-09-21 ENCOUNTER — TRANSITIONAL CARE MANAGEMENT (OUTPATIENT)
Dept: FAMILY MEDICINE CLINIC | Facility: CLINIC | Age: 52
End: 2022-09-21

## 2022-09-22 ENCOUNTER — TELEPHONE (OUTPATIENT)
Dept: VASCULAR SURGERY | Facility: CLINIC | Age: 52
End: 2022-09-22

## 2022-09-22 LAB
APTT SCREEN TO CONFIRM RATIO: 1.01 RATIO (ref 0–1.34)
CONFIRM APTT/NORMAL: 36.9 SEC (ref 0–47.6)
LA PPP-IMP: NORMAL
PROT C AG ACT/NOR PPP IA: 143 % OF NORMAL (ref 60–150)
PROT S ACT/NOR PPP: 115 % (ref 61–136)
PROT S ACT/NOR PPP: 98 % (ref 68–108)
PROT S PPP-ACNC: 80 % (ref 60–150)
SCREEN APTT: 34.8 SEC (ref 0–51.9)
SCREEN DRVVT: 37.6 SEC (ref 0–47)
THROMBIN TIME: 18.1 SEC (ref 0–23)

## 2022-09-22 NOTE — TELEPHONE ENCOUNTER
Ok to move up ov if sooner appointment is available to help ease patient's concerns and reassurance  Recommend continued conservative measures with warm compresses, leg elevation, and compression  If she is not able to tolerate compression stockings suggest wrapping with Ace wrap for comfort and support  Continue Eliquis as directed  If pain or swelling is extreme and worsening she should be re-evaluated

## 2022-09-22 NOTE — TELEPHONE ENCOUNTER
S/w pt and notified of same, pt verbalized understanding  Advised I would transfer her to call center to move up apt and also she should call us prior to ov should she have any additional questions or concerns  Transf to call center

## 2022-09-22 NOTE — TELEPHONE ENCOUNTER
Call was transferred to me from call center  Pt wss recently seen in office, had lev doppler and was admitted to hospital (see d/c summary and ov note for additional info)  Pt states she was told to f/u in office w/in 2 weeks and she was not offered apt until 10/13/22, she would like to be seen sooner,  She is very anxious, has many ? Also she is still having exteme pain in her R thigh at area of VV  She was d/c on Eliquis which she has been taking as directed  She notes the vein in her thigh is becoming hard, red and warm  She was d/c w/ thigh high 15-20mmHg compression hose but states she cannot wear them because they cut into the VV and increase the pain  ? If she has tried warm compresses, she did not as the area was hot so she thought cold would be better  She applied frozen peas yesterday and it helped w/ the heat but not the pain  She cannot take nsaids due to Eliquis  Advised I would route her concerns to our triage provider for additional recommendations and we would get back to her

## 2022-09-23 NOTE — TELEPHONE ENCOUNTER
Pt called the office again today asking if a lesser degree of compression could be prescribed  She is at [facility] now  Asked pt if she has script for 15-20 mmHg? She states she was never given script for 15-20 and she only has a script for 20-30  She is asking if 15-20 would be ok? She is also asking about "compression leggings"  Christina informed her this is not something that they would supply and she would need to get that online  Did advise pt that our nurse practitioner recommended yesterday that if she cannot tolerate the compression stockings at this time then she can try an ace wrap until her OV next week  Pt seems agreeable to this

## 2022-09-26 LAB — F5 GENE MUT ANL BLD/T: NORMAL

## 2022-09-27 ENCOUNTER — OFFICE VISIT (OUTPATIENT)
Dept: FAMILY MEDICINE CLINIC | Facility: CLINIC | Age: 52
End: 2022-09-27
Payer: COMMERCIAL

## 2022-09-27 ENCOUNTER — TELEPHONE (OUTPATIENT)
Dept: FAMILY MEDICINE CLINIC | Facility: CLINIC | Age: 52
End: 2022-09-27

## 2022-09-27 ENCOUNTER — OFFICE VISIT (OUTPATIENT)
Dept: VASCULAR SURGERY | Facility: CLINIC | Age: 52
End: 2022-09-27
Payer: COMMERCIAL

## 2022-09-27 VITALS
WEIGHT: 207 LBS | SYSTOLIC BLOOD PRESSURE: 108 MMHG | BODY MASS INDEX: 34.49 KG/M2 | HEIGHT: 65 IN | DIASTOLIC BLOOD PRESSURE: 72 MMHG | HEART RATE: 72 BPM | RESPIRATION RATE: 18 BRPM

## 2022-09-27 VITALS
OXYGEN SATURATION: 97 % | SYSTOLIC BLOOD PRESSURE: 102 MMHG | RESPIRATION RATE: 18 BRPM | HEIGHT: 65 IN | DIASTOLIC BLOOD PRESSURE: 68 MMHG | BODY MASS INDEX: 34.49 KG/M2 | WEIGHT: 207 LBS | HEART RATE: 70 BPM

## 2022-09-27 DIAGNOSIS — F41.9 ANXIETY: ICD-10-CM

## 2022-09-27 DIAGNOSIS — R07.9 CHEST PAIN: ICD-10-CM

## 2022-09-27 DIAGNOSIS — I26.09 OTHER ACUTE PULMONARY EMBOLISM WITH ACUTE COR PULMONALE (HCC): Primary | ICD-10-CM

## 2022-09-27 DIAGNOSIS — I26.99 MULTIPLE PULMONARY EMBOLI (HCC): ICD-10-CM

## 2022-09-27 DIAGNOSIS — I82.4Z1 DVT, LOWER EXTREMITY, DISTAL, ACUTE, RIGHT (HCC): Primary | ICD-10-CM

## 2022-09-27 DIAGNOSIS — I80.01 THROMBOPHLEBITIS OF SUPERFICIAL VEINS OF RIGHT LOWER EXTREMITY: ICD-10-CM

## 2022-09-27 LAB — F2 GENE MUT ANL BLD/T: NORMAL

## 2022-09-27 PROCEDURE — 99495 TRANSJ CARE MGMT MOD F2F 14D: CPT | Performed by: FAMILY MEDICINE

## 2022-09-27 PROCEDURE — 1111F DSCHRG MED/CURRENT MED MERGE: CPT | Performed by: FAMILY MEDICINE

## 2022-09-27 PROCEDURE — 99213 OFFICE O/P EST LOW 20 MIN: CPT | Performed by: NURSE PRACTITIONER

## 2022-09-27 PROCEDURE — 1111F DSCHRG MED/CURRENT MED MERGE: CPT | Performed by: NURSE PRACTITIONER

## 2022-09-27 RX ORDER — ACETAMINOPHEN 500 MG
1000 TABLET ORAL EVERY 6 HOURS PRN
Qty: 100 TABLET | Refills: 3 | Status: SHIPPED | OUTPATIENT
Start: 2022-09-27

## 2022-09-27 RX ORDER — OXYCODONE HYDROCHLORIDE 5 MG/1
2.5 TABLET ORAL EVERY 6 HOURS PRN
Qty: 15 TABLET | Refills: 0 | Status: SHIPPED | OUTPATIENT
Start: 2022-09-27

## 2022-09-27 RX ORDER — ALPRAZOLAM 0.5 MG/1
0.5 TABLET ORAL 3 TIMES DAILY PRN
Qty: 90 TABLET | Refills: 1 | Status: SHIPPED | OUTPATIENT
Start: 2022-09-27 | End: 2022-10-11 | Stop reason: SDUPTHER

## 2022-09-27 NOTE — ASSESSMENT & PLAN NOTE
16-year-old female former smoker with migraines, anxiety, and RLE varicose veins presents s/p provoked (?)  Right calf soleal vein DVT, RLE SVT and PE     - pt seen in vascular office 9/19 with c/o increased RLE pain x 2 weeks redness and firm varicose veins indicative of SVT  LEV ordered  - patient reports long car travel >6hrs to drop son at college prior to symptoms  - LEV 9/19/22   RIGHT LOWER LIMB  Evidence of acute occlusive deep vein thrombosis noted in the soleal vein in the calf  Evidence of superficial thrombophlebitis noted in the anterior accessory saphenous vein at the proximal and mid thigh, as well as varicosities in the distal lateral thigh   - Pt presented to ED  - CTA PE study 9/19/22: Multiple pulmonary emboli the largest within the distal right main pulmonary artery extending into right middle and lower lobar branches and pulmonary emboli within segmental branches within bilateral upper and lower lobes  Measured RV/LV ratio is within   normal limits at less than 0 9     - pt c/o heavy menstruation with Eliquis    - pt concerned regarding abnormal antithrombin III activity, requests follow up with hematology  - palpable distal pulses bilaterally      Plan:   - continue Eliquis as prescribed- total of 3 months  - conservative measures with warm compresses, compression, ambulation and leg elevation  - referral to Hematology  - return to vascular office 3 months or longer to discuss varicose veins and venous insufficiency and plan moving forward if indicated

## 2022-09-27 NOTE — PROGRESS NOTES
Assessment/Plan:    DVT, lower extremity, distal, acute, right [facility])  70-year-old female former smoker with migraines, anxiety, and RLE varicose veins presents s/p provoked (?)  Right calf soleal vein DVT, RLE SVT and PE     - pt seen in vascular office 9/19 with c/o increased RLE pain x 2 weeks redness and firm varicose veins indicative of SVT  LEV ordered  - patient reports long car travel >6hrs to drop son at [employer] prior to symptoms  - LEV 9/19/22   RIGHT LOWER LIMB  Evidence of acute occlusive deep vein thrombosis noted in the soleal vein in the calf  Evidence of superficial thrombophlebitis noted in the anterior accessory saphenous vein at the proximal and mid thigh, as well as varicosities in the distal lateral thigh   - Pt presented to ED  - CTA PE study 9/19/22: Multiple pulmonary emboli the largest within the distal right main pulmonary artery extending into right middle and lower lobar branches and pulmonary emboli within segmental branches within bilateral upper and lower lobes  Measured RV/LV ratio is within   normal limits at less than 0 9     - pt c/o heavy menstruation with Eliquis  - pt concerned regarding abnormal antithrombin III activity, requests follow up with hematology  - palpable distal pulses bilaterally      Plan:   - continue Eliquis as prescribed- total of 3 months  - conservative measures with warm compresses, compression, ambulation and leg elevation  - referral to Hematology  - return to vascular office 3 months or longer to discuss varicose veins and venous insufficiency and plan moving forward if indicated       Diagnoses and all orders for this visit:    DVT, lower extremity, distal, acute, right ([address] )  -     Cancel: Compression Stocking  -     Cancel: Compression Stocking  -     Cancel: Ambulatory Referral to Hematology / Oncology; Future  -     Ambulatory Referral to Hematology / Oncology;  Future  -     Compression Stocking  -     Compression Stocking    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  -     Cancel: Compression Stocking  -     Cancel: Compression Stocking  -     Cancel: Ambulatory Referral to Hematology / Oncology; Future  -     Ambulatory Referral to Hematology / Oncology; Future  -     Compression Stocking  -     Compression Stocking          Subjective:      Patient ID: Andrews Webster is a 50 y o  female  Patient is new to our practice and was referred by ER  Pt was in [facility] on 9/19 and had a LEV  Pt c/o RLE pain and swelling  Pt states that she has bruising  Pt is on Eliquis  Pt is not wearing compression stockings or elevating her legs  HPI  See assessment and plan     82-year-old female presents with recent RLE calf soleal vein DVT, RLE SVT and PE  Patient is on Eliquis day 7  Taking 10 mg p o  b i d  She will transition to 5 mg p o  b i d  starting tomorrow 09/28/2022  Patient was initially evaluated in vascular office 09/19/2022 with complaints of RLE painful dilated, firm varicosity worsening over 2 weeks  Patient reports recent travel by airplane and long distance car travel over 6 hours to drop son at [employer] prior to symptoms beginning  Patient also endorsed CP and SOB at that time  Patient was evaluated in ED  CTA PE study demonstrated multiple PE  Patient was started on anticoagulation (Eliquis)  Patient presents today for follow-up, she is anxious with many questions  We discussed at length pathophysiology of DVT/SVT provoked versus unprovoked and recommended treatment  She reports history of SVT approximately 17 years ago after birth of a child  Multiple questions answered  Patient concerned with hypercoagulable workup and requesting hematology referral     Patient does endorse heavy menstruation now on Eliquis  Advised to continue to monitor and follow up with PCP/gynecology if continuing or developing clots    Reviewed conservative measures with warm compresses, compression, ambulation as tolerated leg elevation at rest     Patient is unable to tolerate compression at this time, Tubigrip attempted in office however patient declined  No further vascular imaging indicated  Can follow-up in future to discuss varicose veins and symptoms of venous insufficiency and options if interested at a later date  The following portions of the patient's history were reviewed and updated as appropriate: allergies, current medications, past family history, past medical history, past social history, past surgical history and problem list     Review of Systems   Constitutional: Negative  HENT: Negative  Eyes: Negative  Respiratory: Negative  Cardiovascular: Positive for leg swelling  Endocrine: Negative  Genitourinary: Positive for menstrual problem  Musculoskeletal: Negative  Skin: Negative  Allergic/Immunologic: Negative  Neurological: Negative  Hematological: Bruises/bleeds easily  Psychiatric/Behavioral: Negative  I have reviewed and made appropriate changes to the review of systems input by the medical assistant  Objective:      /72 (BP Location: Left arm, Patient Position: Sitting)   Pulse 72   Resp 18   Ht 5' 5" (1 651 m)   Wt 93 9 kg (207 lb)   LMP 09/26/2022   BMI 34 45 kg/m²          Physical Exam  Vitals reviewed  Constitutional:       Appearance: Normal appearance  Cardiovascular:      Rate and Rhythm: Normal rate and regular rhythm  Pulses: Normal pulses  Dorsalis pedis pulses are 2+ on the right side  Heart sounds: Normal heart sounds  Pulmonary:      Effort: Pulmonary effort is normal       Breath sounds: Normal breath sounds  Musculoskeletal:         General: Normal range of motion  Right lower leg: Edema present  Skin:     General: Skin is warm and dry  Comments: RLE SVT, hyperpigmentation anterior thigh cord   Neurological:      Mental Status: She is alert and oriented to person, place, and time     Psychiatric: Mood and Affect: Mood is anxious             Vitals:    22 1446   BP: 108/72   BP Location: Left arm   Patient Position: Sitting   Pulse: 72   Resp: 18   Weight: 93 9 kg (207 lb)   Height: 5' 5" (1 651 m)       Patient Active Problem List   Diagnosis    Anxiety    Keratosis pilaris    Primary insomnia    Varicose veins of leg with pain, right    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    Pulmonary emboli (HCC)    Migraines    DVT, lower extremity, distal, acute, right [facility])       Past Surgical History:   Procedure Laterality Date     SECTION  2005    2nd son's birth    TUBAL LIGATION      after second son was born in        Family History   Problem Relation Age of Onset    Hypertension Father     Breast cancer Paternal Grandmother     Diabetes Paternal Grandmother     Substance Abuse Neg Hx     Mental illness Neg Hx        Social History     Socioeconomic History    Marital status: /Civil Union     Spouse name: Not on file    Number of children: Not on file    Years of education: Not on file    Highest education level: Not on file   Occupational History    Not on file   Tobacco Use    Smoking status: Former Smoker     Packs/day: 0 25     Types: Cigarettes    Smokeless tobacco: Never Used    Tobacco comment: havent smoked in a long time   Vaping Use    Vaping Use: Every day    Substances: Flavoring   Substance and Sexual Activity    Alcohol use: Not Currently     Comment: socially    Drug use: Never    Sexual activity: Yes     Partners: Male     Birth control/protection: Surgical     Comment: Tubal ligation   Other Topics Concern    Not on file   Social History Narrative    Not on file     Social Determinants of Health     Financial Resource Strain: Not on file   Food Insecurity: Not on file   Transportation Needs: Not on file   Physical Activity: Not on file   Stress: Not on file   Social Connections: Not on file   Intimate Partner Violence: Not on file   Housing Stability: Not on file       Allergies   Allergen Reactions    Iodine I 131 Tositumomab Rash     OK for CT per Dr Nolberto Baltazar Doxycycline GI Intolerance and Vomiting    Latex     Other Hives         Current Outpatient Medications:     acetaminophen (TYLENOL) 500 mg tablet, Take 2 tablets (1,000 mg total) by mouth every 6 (six) hours as needed for mild pain, Disp: 100 tablet, Rfl: 3    ALPRAZolam (XANAX) 0 5 mg tablet, Take 1 tablet (0 5 mg total) by mouth 3 (three) times a day as needed for anxiety, Disp: 90 tablet, Rfl: 1    apixaban (Eliquis) 5 mg, Take 2 tablets (10 mg total) by mouth 2 (two) times a day for 7 days, THEN 1 tablet (5 mg total) 2 (two) times a day , Disp: 130 tablet, Rfl: 2    oxyCODONE (ROXICODONE) 5 immediate release tablet, Take 0 5 tablets (2 5 mg total) by mouth every 6 (six) hours as needed for severe pain Max Daily Amount: 10 mg, Disp: 15 tablet, Rfl: 0

## 2022-09-27 NOTE — TELEPHONE ENCOUNTER
Patient calls in requesting for one script be written for day time anxiety and another is for sleeping at night  Patient asking about the genetic condition that showed in testing that could be hereditary

## 2022-09-27 NOTE — PATIENT INSTRUCTIONS
-Continue Eliquis as prescribed for total of 3 months   -Continue conservative management for DVT/SVT with daily compression, on a m , off in p m    -Ambulation as tolerated, leg elevation at rest   May apply warm compresses to areas of SVT    -Hematology referral placed  -return to vascular office in 3 months for further discussion of varicose veins and further testing or intervention if indicated

## 2022-09-27 NOTE — TELEPHONE ENCOUNTER
PATIENT CALLS IN-STATES SHE FORGOT TO  ASK FOR ANOTHER RX TO TAKE  5 MG OF XANAX AT NIGHT FOR SLEEPING

## 2022-09-27 NOTE — PROGRESS NOTES
Assessment/Plan:    No problem-specific Assessment & Plan notes found for this encounter  Diagnoses and all orders for this visit:    Other acute pulmonary embolism with acute cor pulmonale (HCC)    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          Subjective:   Chief Complaint   Patient presents with    Transition of Care Management        Patient ID: Pedro Levin is a 50 y o  female  HPI    The following portions of the patient's history were reviewed and updated as appropriate: allergies, current medications, past family history, past medical history, past social history, past surgical history and problem list     Review of Systems   Constitutional: Negative for fatigue, fever and unexpected weight change  HENT: Negative for congestion, sinus pain and sore throat  Eyes: Negative for visual disturbance  Respiratory: Negative for shortness of breath and wheezing  Cardiovascular: Negative for chest pain and palpitations  Gastrointestinal: Negative for abdominal pain, nausea and vomiting  Musculoskeletal: Negative  Negative for arthralgias and myalgias  Neurological: Negative for syncope, weakness and numbness  Psychiatric/Behavioral: Negative  Negative for confusion, dysphoric mood and suicidal ideas  Objective:  Vitals:    09/27/22 0826   BP: 102/68   Pulse: 70   Resp: 18   SpO2: 97%   Weight: 93 9 kg (207 lb)   Height: 5' 5" (1 651 m)      Physical Exam  Constitutional:       Appearance: She is well-developed  HENT:      Right Ear: Ear canal normal  Tympanic membrane is not injected  Left Ear: Ear canal normal  Tympanic membrane is not injected  Nose: Nose normal    Eyes:      General:         Right eye: No discharge  Left eye: No discharge  Conjunctiva/sclera: Conjunctivae normal       Pupils: Pupils are equal, round, and reactive to light  Neck:      Thyroid: No thyromegaly     Cardiovascular:      Rate and Rhythm: Normal rate and regular rhythm  Heart sounds: Normal heart sounds  No murmur heard  Pulmonary:      Effort: Pulmonary effort is normal  No respiratory distress  Breath sounds: Normal breath sounds  No wheezing  Abdominal:      General: Bowel sounds are normal  There is no distension  Palpations: Abdomen is soft  Tenderness: There is no abdominal tenderness  Musculoskeletal:         General: Normal range of motion  Cervical back: Normal range of motion and neck supple  Lymphadenopathy:      Cervical: No cervical adenopathy  Skin:     General: Skin is warm and dry  Neurological:      Mental Status: She is alert and oriented to person, place, and time  She is not disoriented  Sensory: No sensory deficit  Gait: Gait normal       Deep Tendon Reflexes: Reflexes are normal and symmetric  Psychiatric:         Speech: Speech normal          Behavior: Behavior normal          Thought Content:  Thought content normal          Judgment: Judgment normal

## 2022-10-08 DIAGNOSIS — F41.9 ANXIETY: ICD-10-CM

## 2022-10-10 NOTE — TELEPHONE ENCOUNTER
Patient aware,   Would like new script called in for xanax 4/day/   [facility] [phone]    Patient will f/u with hemo-in December

## 2022-10-11 DIAGNOSIS — F41.9 ANXIETY: ICD-10-CM

## 2022-10-11 RX ORDER — ALPRAZOLAM 0.5 MG/1
0.5 TABLET ORAL 4 TIMES DAILY PRN
Qty: 120 TABLET | Refills: 5 | Status: SHIPPED | OUTPATIENT
Start: 2022-10-11

## 2022-10-17 ENCOUNTER — TELEPHONE (OUTPATIENT)
Dept: FAMILY MEDICINE CLINIC | Facility: CLINIC | Age: 52
End: 2022-10-17

## 2022-10-17 DIAGNOSIS — I26.99 MULTIPLE PULMONARY EMBOLI (HCC): ICD-10-CM

## 2022-10-17 DIAGNOSIS — I82.401 DEEP VEIN THROMBOSIS (DVT) OF RIGHT LOWER EXTREMITY (HCC): ICD-10-CM

## 2022-10-17 NOTE — TELEPHONE ENCOUNTER
Approvedtoday  CaseId:[id number];Status:Approved; Review Type:Prior Auth; Coverage Start Date:09/17/2022; Coverage End Date:10/17/2023;

## 2022-12-28 ENCOUNTER — APPOINTMENT (EMERGENCY)
Dept: CT IMAGING | Facility: HOSPITAL | Age: 52
End: 2022-12-28

## 2022-12-28 ENCOUNTER — APPOINTMENT (OUTPATIENT)
Dept: LAB | Facility: CLINIC | Age: 52
End: 2022-12-28

## 2022-12-28 ENCOUNTER — CONSULT (OUTPATIENT)
Dept: HEMATOLOGY ONCOLOGY | Facility: CLINIC | Age: 52
End: 2022-12-28

## 2022-12-28 ENCOUNTER — HOSPITAL ENCOUNTER (EMERGENCY)
Facility: HOSPITAL | Age: 52
Discharge: HOME | End: 2022-12-28
Attending: EMERGENCY MEDICINE

## 2022-12-28 ENCOUNTER — TELEPHONE (OUTPATIENT)
Dept: HEMATOLOGY ONCOLOGY | Facility: CLINIC | Age: 52
End: 2022-12-28

## 2022-12-28 ENCOUNTER — HOSPITAL ENCOUNTER (OUTPATIENT)
Dept: VASCULAR ULTRASOUND | Facility: HOSPITAL | Age: 52
Discharge: HOME | End: 2022-12-28

## 2022-12-28 ENCOUNTER — OFFICE VISIT (OUTPATIENT)
Dept: VASCULAR SURGERY | Facility: CLINIC | Age: 52
End: 2022-12-28

## 2022-12-28 ENCOUNTER — HOSPITAL ENCOUNTER (OUTPATIENT)
Dept: VASCULAR ULTRASOUND | Facility: HOSPITAL | Age: 52
End: 2022-12-28

## 2022-12-28 ENCOUNTER — HOSPITAL ENCOUNTER (OUTPATIENT)
Dept: RADIOLOGY | Facility: HOSPITAL | Age: 52
Discharge: HOME | End: 2022-12-28

## 2022-12-28 VITALS
DIASTOLIC BLOOD PRESSURE: 82 MMHG | RESPIRATION RATE: 18 BRPM | OXYGEN SATURATION: 97 % | BODY MASS INDEX: 35.82 KG/M2 | HEART RATE: 77 BPM | SYSTOLIC BLOOD PRESSURE: 124 MMHG | WEIGHT: 215 LBS | TEMPERATURE: 98 F | HEIGHT: 65 IN

## 2022-12-28 VITALS
SYSTOLIC BLOOD PRESSURE: 116 MMHG | WEIGHT: 217.6 LBS | DIASTOLIC BLOOD PRESSURE: 72 MMHG | HEIGHT: 65 IN | HEART RATE: 67 BPM | BODY MASS INDEX: 36.25 KG/M2

## 2022-12-28 VITALS
BODY MASS INDEX: 36.28 KG/M2 | OXYGEN SATURATION: 99 % | DIASTOLIC BLOOD PRESSURE: 71 MMHG | TEMPERATURE: 99 F | SYSTOLIC BLOOD PRESSURE: 122 MMHG | HEART RATE: 84 BPM | WEIGHT: 218 LBS | RESPIRATION RATE: 20 BRPM

## 2022-12-28 DIAGNOSIS — I26.99 OTHER ACUTE PULMONARY EMBOLISM WITHOUT ACUTE COR PULMONALE (HCC): ICD-10-CM

## 2022-12-28 DIAGNOSIS — I82.461 ACUTE DEEP VEIN THROMBOSIS (DVT) OF CALF MUSCLE VEIN OF RIGHT LOWER EXTREMITY (HCC): ICD-10-CM

## 2022-12-28 DIAGNOSIS — I80.01 THROMBOPHLEBITIS OF SUPERFICIAL VEINS OF RIGHT LOWER EXTREMITY: ICD-10-CM

## 2022-12-28 DIAGNOSIS — M79.89 PAIN AND SWELLING OF LOWER LEG, UNSPECIFIED LATERALITY: ICD-10-CM

## 2022-12-28 DIAGNOSIS — R05.1 ACUTE COUGH: ICD-10-CM

## 2022-12-28 DIAGNOSIS — R11.0 NAUSEA: ICD-10-CM

## 2022-12-28 DIAGNOSIS — M79.669 PAIN AND SWELLING OF LOWER LEG, UNSPECIFIED LATERALITY: ICD-10-CM

## 2022-12-28 DIAGNOSIS — I26.09 OTHER ACUTE PULMONARY EMBOLISM WITH ACUTE COR PULMONALE (HCC): ICD-10-CM

## 2022-12-28 DIAGNOSIS — R05.9 COUGH: ICD-10-CM

## 2022-12-28 DIAGNOSIS — R06.02 SHORTNESS OF BREATH: ICD-10-CM

## 2022-12-28 DIAGNOSIS — I82.4Z1 DVT, LOWER EXTREMITY, DISTAL, ACUTE, RIGHT (HCC): Primary | ICD-10-CM

## 2022-12-28 DIAGNOSIS — I83.811 VARICOSE VEINS OF LEG WITH PAIN, RIGHT: ICD-10-CM

## 2022-12-28 DIAGNOSIS — J11.1 INFLUENZA: Primary | ICD-10-CM

## 2022-12-28 DIAGNOSIS — I82.461 ACUTE DEEP VEIN THROMBOSIS (DVT) OF CALF MUSCLE VEIN OF RIGHT LOWER EXTREMITY (HCC): Primary | ICD-10-CM

## 2022-12-28 LAB
ALBUMIN SERPL BCP-MCNC: 3.6 G/DL (ref 3.5–5)
ALBUMIN SERPL BCP-MCNC: 3.8 G/DL (ref 3.5–5)
ALP SERPL-CCNC: 83 U/L (ref 34–104)
ALP SERPL-CCNC: 87 U/L (ref 34–104)
ALT SERPL W P-5'-P-CCNC: 8 U/L (ref 7–52)
ALT SERPL W P-5'-P-CCNC: 8 U/L (ref 7–52)
ANION GAP SERPL CALCULATED.3IONS-SCNC: 5 MMOL/L (ref 4–13)
ANION GAP SERPL CALCULATED.3IONS-SCNC: 7 MMOL/L (ref 4–13)
AST SERPL W P-5'-P-CCNC: 12 U/L (ref 13–39)
AST SERPL W P-5'-P-CCNC: 12 U/L (ref 13–39)
BASOPHILS # BLD AUTO: 0.05 THOUSANDS/ÂΜL (ref 0–0.1)
BASOPHILS # BLD AUTO: 0.08 THOUSANDS/ÂΜL (ref 0–0.1)
BASOPHILS NFR BLD AUTO: 1 % (ref 0–1)
BASOPHILS NFR BLD AUTO: 2 % (ref 0–1)
BILIRUB SERPL-MCNC: 0.31 MG/DL (ref 0.2–1)
BILIRUB SERPL-MCNC: 0.33 MG/DL (ref 0.2–1)
BUN SERPL-MCNC: 10 MG/DL (ref 5–25)
BUN SERPL-MCNC: 13 MG/DL (ref 5–25)
CALCIUM SERPL-MCNC: 8.5 MG/DL (ref 8.4–10.2)
CALCIUM SERPL-MCNC: 8.5 MG/DL (ref 8.4–10.2)
CARDIAC TROPONIN I PNL SERPL HS: <2 NG/L
CHLORIDE SERPL-SCNC: 106 MMOL/L (ref 96–108)
CHLORIDE SERPL-SCNC: 107 MMOL/L (ref 96–108)
CO2 SERPL-SCNC: 25 MMOL/L (ref 21–32)
CO2 SERPL-SCNC: 25 MMOL/L (ref 21–32)
CREAT SERPL-MCNC: 0.66 MG/DL (ref 0.6–1.3)
CREAT SERPL-MCNC: 0.68 MG/DL (ref 0.6–1.3)
D DIMER PPP FEU-MCNC: 0.65 UG/ML FEU
DEPRECATED AT III PPP: 90 % OF NORMAL (ref 92–136)
EOSINOPHIL # BLD AUTO: 0.03 THOUSAND/ÂΜL (ref 0–0.61)
EOSINOPHIL # BLD AUTO: 0.06 THOUSAND/ÂΜL (ref 0–0.61)
EOSINOPHIL NFR BLD AUTO: 1 % (ref 0–6)
EOSINOPHIL NFR BLD AUTO: 1 % (ref 0–6)
ERYTHROCYTE [DISTWIDTH] IN BLOOD BY AUTOMATED COUNT: 17.8 % (ref 11.6–15.1)
ERYTHROCYTE [DISTWIDTH] IN BLOOD BY AUTOMATED COUNT: 17.8 % (ref 11.6–15.1)
FLUAV RNA RESP QL NAA+PROBE: POSITIVE
FLUBV RNA RESP QL NAA+PROBE: NEGATIVE
GFR SERPL CREATININE-BSD FRML MDRD: 102 ML/MIN/1.73SQ M
GFR SERPL CREATININE-BSD FRML MDRD: 103 ML/MIN/1.73SQ M
GLUCOSE P FAST SERPL-MCNC: 90 MG/DL (ref 65–99)
GLUCOSE SERPL-MCNC: 110 MG/DL (ref 65–140)
HCT VFR BLD AUTO: 35.1 % (ref 34.8–46.1)
HCT VFR BLD AUTO: 35.3 % (ref 34.8–46.1)
HGB BLD-MCNC: 10.7 G/DL (ref 11.5–15.4)
HGB BLD-MCNC: 10.7 G/DL (ref 11.5–15.4)
IMM GRANULOCYTES # BLD AUTO: 0.02 THOUSAND/UL (ref 0–0.2)
IMM GRANULOCYTES # BLD AUTO: 0.02 THOUSAND/UL (ref 0–0.2)
IMM GRANULOCYTES NFR BLD AUTO: 0 % (ref 0–2)
IMM GRANULOCYTES NFR BLD AUTO: 0 % (ref 0–2)
LYMPHOCYTES # BLD AUTO: 0.36 THOUSANDS/ÂΜL (ref 0.6–4.47)
LYMPHOCYTES # BLD AUTO: 0.5 THOUSANDS/ÂΜL (ref 0.6–4.47)
LYMPHOCYTES NFR BLD AUTO: 10 % (ref 14–44)
LYMPHOCYTES NFR BLD AUTO: 6 % (ref 14–44)
MCH RBC QN AUTO: 24.5 PG (ref 26.8–34.3)
MCH RBC QN AUTO: 24.8 PG (ref 26.8–34.3)
MCHC RBC AUTO-ENTMCNC: 30.3 G/DL (ref 31.4–37.4)
MCHC RBC AUTO-ENTMCNC: 30.5 G/DL (ref 31.4–37.4)
MCV RBC AUTO: 81 FL (ref 82–98)
MCV RBC AUTO: 81 FL (ref 82–98)
MONOCYTES # BLD AUTO: 0.45 THOUSAND/ÂΜL (ref 0.17–1.22)
MONOCYTES # BLD AUTO: 0.49 THOUSAND/ÂΜL (ref 0.17–1.22)
MONOCYTES NFR BLD AUTO: 8 % (ref 4–12)
MONOCYTES NFR BLD AUTO: 9 % (ref 4–12)
NEUTROPHILS # BLD AUTO: 3.75 THOUSANDS/ÂΜL (ref 1.85–7.62)
NEUTROPHILS # BLD AUTO: 4.94 THOUSANDS/ÂΜL (ref 1.85–7.62)
NEUTS SEG NFR BLD AUTO: 78 % (ref 43–75)
NEUTS SEG NFR BLD AUTO: 84 % (ref 43–75)
NRBC BLD AUTO-RTO: 0 /100 WBCS
NRBC BLD AUTO-RTO: 0 /100 WBCS
PLATELET # BLD AUTO: 306 THOUSANDS/UL (ref 149–390)
PLATELET # BLD AUTO: 334 THOUSANDS/UL (ref 149–390)
PMV BLD AUTO: 9.4 FL (ref 8.9–12.7)
PMV BLD AUTO: 9.4 FL (ref 8.9–12.7)
POTASSIUM SERPL-SCNC: 3.6 MMOL/L (ref 3.5–5.3)
POTASSIUM SERPL-SCNC: 3.8 MMOL/L (ref 3.5–5.3)
PROT SERPL-MCNC: 6.4 G/DL (ref 6.4–8.4)
PROT SERPL-MCNC: 6.8 G/DL (ref 6.4–8.4)
RBC # BLD AUTO: 4.32 MILLION/UL (ref 3.81–5.12)
RBC # BLD AUTO: 4.37 MILLION/UL (ref 3.81–5.12)
RSV RNA RESP QL NAA+PROBE: NEGATIVE
SARS-COV-2 RNA RESP QL NAA+PROBE: NEGATIVE
SODIUM SERPL-SCNC: 137 MMOL/L (ref 135–147)
SODIUM SERPL-SCNC: 138 MMOL/L (ref 135–147)
WBC # BLD AUTO: 4.83 THOUSAND/UL (ref 4.31–10.16)
WBC # BLD AUTO: 5.92 THOUSAND/UL (ref 4.31–10.16)

## 2022-12-28 RX ORDER — OSELTAMIVIR PHOSPHATE 75 MG/1
75 CAPSULE ORAL ONCE
Status: COMPLETED | OUTPATIENT
Start: 2022-12-28 | End: 2022-12-28

## 2022-12-28 RX ORDER — BENZONATATE 100 MG/1
100 CAPSULE ORAL 2 TIMES DAILY PRN
Qty: 14 CAPSULE | Refills: 0 | Status: SHIPPED | OUTPATIENT
Start: 2022-12-28 | End: 2023-01-04

## 2022-12-28 RX ORDER — OSELTAMIVIR PHOSPHATE 75 MG/1
75 CAPSULE ORAL EVERY 12 HOURS
Qty: 10 CAPSULE | Refills: 0 | Status: SHIPPED | OUTPATIENT
Start: 2022-12-28 | End: 2023-01-02

## 2022-12-28 RX ORDER — ONDANSETRON 4 MG/1
4 TABLET, FILM COATED ORAL EVERY 12 HOURS PRN
Qty: 10 TABLET | Refills: 0 | Status: SHIPPED | OUTPATIENT
Start: 2022-12-28 | End: 2023-01-02

## 2022-12-28 RX ADMIN — IOHEXOL 100 ML: 350 INJECTION, SOLUTION INTRAVENOUS at 14:41

## 2022-12-28 RX ADMIN — OSELTAMIVIR PHOSPHATE 75 MG: 75 CAPSULE ORAL at 15:22

## 2022-12-28 NOTE — ED PROVIDER NOTES
History  Chief Complaint   Patient presents with   • Shortness of Breath     Pt with hx of PE just finished 3 month course of eliquis, had follow up appointment today  Pt c/o 2-3 days of cough/congestion increased SOB  Pt was sent to ED from hematology this AM for concern of SOB  56yo F w/ h/o PE presenting for SOB  Pt was dx with PE back in September and was placed on eliquis  She finished her 3 month course last week  Over the past week she's been experiencing worsening SOB and productive cough  While at heme appointment today she was found to have an elevated D-dimer  D/t her hx and sx, she was advised to go to the ED for PE r/o  Endorses recent sick contacts  Denies CP, F/C, lightheadedness, back pain, abd pain  History provided by:  Patient      Prior to Admission Medications   Prescriptions Last Dose Informant Patient Reported? Taking?    ALPRAZolam (XANAX) 0 5 mg tablet   No No   Sig: Take 1 tablet (0 5 mg total) by mouth 4 (four) times a day as needed for anxiety   Patient not taking: Reported on 2022   rivaroxaban (Xarelto) 20 mg tablet   No No   Sig: Take 1 tablet (20 mg total) by mouth daily with breakfast      Facility-Administered Medications: None       Past Medical History:   Diagnosis Date   • Allergic     as long as i can remember dust pollen etc   • Anemia     i can tend to be anemic i control through diet & supplementn   • Anxiety     as long as i can remember   • GERD (gastroesophageal reflux disease)     i have it on occasion   • Headache(784 0)     i get tension headaches alot       Past Surgical History:   Procedure Laterality Date   •  SECTION  2005    2nd son's birth   • TUBAL LIGATION      after second son was born in [birth year]       Family History   Problem Relation Age of Onset   • Hypertension Father    • Breast cancer Paternal Grandmother    • Diabetes Paternal Grandmother    • Substance Abuse Neg Hx    • Mental illness Neg Hx      I have reviewed and agree with the history as documented  E-Cigarette/Vaping   • E-Cigarette Use Current Every Day User      E-Cigarette/Vaping Substances   • THC No    • CBD No    • Flavoring Yes      Social History     Tobacco Use   • Smoking status: Former     Packs/day: 0 25     Types: Cigarettes   • Smokeless tobacco: Never   • Tobacco comments:     havent smoked in a long time   Vaping Use   • Vaping Use: Every day   • Substances: Flavoring   Substance Use Topics   • Alcohol use: Not Currently     Comment: socially   • Drug use: Never        Review of Systems   Constitutional: Negative  HENT: Negative  Respiratory: Positive for cough and shortness of breath  Negative for wheezing  Cardiovascular: Negative  Gastrointestinal: Negative  Genitourinary: Negative  Musculoskeletal: Negative  Skin: Negative  Neurological: Negative  Psychiatric/Behavioral: Negative  Physical Exam  ED Triage Vitals [12/28/22 1324]   Temperature Pulse Respirations Blood Pressure SpO2   99 °F (37 2 °C) 84 20 122/71 99 %      Temp Source Heart Rate Source Patient Position - Orthostatic VS BP Location FiO2 (%)   Oral Monitor Lying Left arm --      Pain Score       4             Orthostatic Vital Signs  Vitals:    12/28/22 1324   BP: 122/71   Pulse: 84   Patient Position - Orthostatic VS: Lying       Physical Exam  Constitutional:       General: She is not in acute distress  Appearance: Normal appearance  HENT:      Head: Normocephalic and atraumatic  Right Ear: External ear normal       Left Ear: External ear normal       Nose: Nose normal  No rhinorrhea  Mouth/Throat:      Mouth: Mucous membranes are moist       Pharynx: Oropharynx is clear  Eyes:      Extraocular Movements: Extraocular movements intact  Conjunctiva/sclera: Conjunctivae normal    Cardiovascular:      Rate and Rhythm: Normal rate and regular rhythm  Pulses: Normal pulses  Heart sounds: Normal heart sounds     Pulmonary:      Effort: Pulmonary effort is normal       Breath sounds: Normal breath sounds  Abdominal:      General: Abdomen is flat  Palpations: Abdomen is soft  Musculoskeletal:         General: Tenderness (right calf (chronic)) present  Right lower leg: Edema (chronic) present  Left lower leg: Edema (chronic) present  Skin:     General: Skin is warm and dry  Capillary Refill: Capillary refill takes less than 2 seconds  Neurological:      General: No focal deficit present  Mental Status: She is alert and oriented to person, place, and time  Sensory: No sensory deficit  Motor: No weakness  Psychiatric:         Mood and Affect: Mood normal          Behavior: Behavior normal          ED Medications  Medications   iohexol (OMNIPAQUE) 350 MG/ML injection (SINGLE-DOSE) 100 mL (100 mL Intravenous Given 12/28/22 1441)   oseltamivir (TAMIFLU) capsule 75 mg (75 mg Oral Given 12/28/22 1522)       Diagnostic Studies  Results Reviewed     Procedure Component Value Units Date/Time    FLU/RSV/COVID - if FLU/RSV clinically relevant [[id number]]  (Abnormal) Collected: 12/28/22 1411    Lab Status: Final result Specimen: Nares from Nose Updated: 12/28/22 1459     SARS-CoV-2 Negative     INFLUENZA A PCR Positive     INFLUENZA B PCR Negative     RSV PCR Negative    Narrative:      FOR PEDIATRIC PATIENTS - copy/paste COVID Guidelines URL to browser: [email or web address]/  [email or web address]    SARS-CoV-2 assay is a Nucleic Acid Amplification assay intended for the  qualitative detection of nucleic acid from SARS-CoV-2 in nasopharyngeal  swabs  Results are for the presumptive identification of SARS-CoV-2 RNA  Positive results are indicative of infection with SARS-CoV-2, the virus  causing COVID-19, but do not rule out bacterial infection or co-infection  with other viruses   Laboratories within the [place] and its  territories are required to report all positive results to the appropriate  public health authorities  Negative results do not preclude SARS-CoV-2  infection and should not be used as the sole basis for treatment or other  patient management decisions  Negative results must be combined with  clinical observations, patient history, and epidemiological information  This test has not been FDA cleared or approved  This test has been authorized by FDA under an Emergency Use Authorization  (EUA)  This test is only authorized for the duration of time the  declaration that circumstances exist justifying the authorization of the  emergency use of an in vitro diagnostic tests for detection of SARS-CoV-2  virus and/or diagnosis of COVID-19 infection under section 564(b)(1) of  the Act, 21 U  S C  644JOZ-4(N)(7), unless the authorization is terminated  or revoked sooner  The test has been validated but independent review by FDA  and CLIA is pending  Test performed using [employer] GeneXpert: This RT-PCR assay targets N2,  a region unique to SARS-CoV-2  A conserved region in the E-gene was chosen  for pan-Sarbecovirus detection which includes SARS-CoV-2  According to CMS-2020-01-R, this platform meets the definition of high-throughput technology      HS Troponin 0hr (reflex protocol) [[id number]]  (Normal) Collected: 12/28/22 1411    Lab Status: Final result Specimen: Blood from Arm, Left Updated: 12/28/22 1442     hs TnI 0hr <2 ng/L     Comprehensive metabolic panel [[id number]]  (Abnormal) Collected: 12/28/22 1411    Lab Status: Final result Specimen: Blood from Arm, Left Updated: 12/28/22 1440     Sodium 138 mmol/L      Potassium 3 6 mmol/L      Chloride 106 mmol/L      CO2 25 mmol/L      ANION GAP 7 mmol/L      BUN 10 mg/dL      Creatinine 0 68 mg/dL      Glucose 110 mg/dL      Calcium 8 5 mg/dL      AST 12 U/L      ALT 8 U/L      Alkaline Phosphatase 87 U/L      Total Protein 6 8 g/dL      Albumin 3 8 g/dL      Total Bilirubin 0 31 mg/dL      eGFR 102 ml/min/1 73sq m Narrative:      National Kidney Disease Foundation guidelines for Chronic Kidney Disease (CKD):   •  Stage 1 with normal or high GFR (GFR > 90 mL/min/1 73 square meters)  •  Stage 2 Mild CKD (GFR = 60-89 mL/min/1 73 square meters)  •  Stage 3A Moderate CKD (GFR = 45-59 mL/min/1 73 square meters)  •  Stage 3B Moderate CKD (GFR = 30-44 mL/min/1 73 square meters)  •  Stage 4 Severe CKD (GFR = 15-29 mL/min/1 73 square meters)  •  Stage 5 End Stage CKD (GFR <15 mL/min/1 73 square meters)  Note: GFR calculation is accurate only with a steady state creatinine    CBC and differential [[id number]]  (Abnormal) Collected: 12/28/22 1411    Lab Status: Final result Specimen: Blood from Arm, Left Updated: 12/28/22 1425     WBC 4 83 Thousand/uL      RBC 4 37 Million/uL      Hemoglobin 10 7 g/dL      Hematocrit 35 3 %      MCV 81 fL      MCH 24 5 pg      MCHC 30 3 g/dL      RDW 17 8 %      MPV 9 4 fL      Platelets 910 Thousands/uL      nRBC 0 /100 WBCs      Neutrophils Relative 78 %      Immat GRANS % 0 %      Lymphocytes Relative 10 %      Monocytes Relative 9 %      Eosinophils Relative 1 %      Basophils Relative 2 %      Neutrophils Absolute 3 75 Thousands/µL      Immature Grans Absolute 0 02 Thousand/uL      Lymphocytes Absolute 0 50 Thousands/µL      Monocytes Absolute 0 45 Thousand/µL      Eosinophils Absolute 0 03 Thousand/µL      Basophils Absolute 0 08 Thousands/µL                  CTA ED chest PE Study   ED Interpretation by Isidoro Kerns MD (12/28 0072)   No evidence of acute cardiopulmonary pathology  Final Result by Paulino Medellin MD (12/28 1572)      No acute pulmonary emboli with resolution of previous pulmonary emboli  No acute pulmonary disease  Small hiatal hernia               Workstation performed: [id number]               Procedures  ECG 12 Lead Documentation Only    Date/Time: 12/28/2022 2:15 PM  Performed by: Isidoro Kerns MD  Authorized by: Isidoro Kerns MD     ECG reviewed by me, the ED Provider: yes    Patient location:  ED  Interpretation:     Interpretation: normal    Rate:     ECG rate:  95    ECG rate assessment: normal    Rhythm:     Rhythm: sinus rhythm    Ectopy:     Ectopy: none    QRS:     QRS axis:  Normal    QRS intervals:  Normal  Conduction:     Conduction: normal    ST segments:     ST segments:  Normal  T waves:     T waves: normal            ED Course  ED Course as of 12/28/22 1629   Wed Dec 28, 2022   1413 Ddx includes but not limited to ACS, PE, PNA  HEART Risk Score    Flowsheet Row Most Recent Value   Heart Score Risk Calculator    History 0 Filed at: 12/28/2022 1523   ECG 0 Filed at: 12/28/2022 1523   Age 1 Filed at: 12/28/2022 1523   Risk Factors 1 Filed at: 12/28/2022 1523   Troponin 0 Filed at: 12/28/2022 1523   HEART Score 2 Filed at: 12/28/2022 1523                      SBIRT 20yo+    Flowsheet Row Most Recent Value   SBIRT (23 yo +)    In order to provide better care to our patients, we are screening all of our patients for alcohol and drug use  Would it be okay to ask you these screening questions? Yes Filed at: 12/28/2022 1417   Initial Alcohol Screen: US AUDIT-C     1  How often do you have a drink containing alcohol? 1 Filed at: 12/28/2022 1417   2  How many drinks containing alcohol do you have on a typical day you are drinking? 0 Filed at: 12/28/2022 1417   3b  FEMALE Any Age, or MALE 65+: How often do you have 4 or more drinks on one occassion? 0 Filed at: 12/28/2022 1417   Audit-C Score 1 Filed at: 12/28/2022 1417   ARI: How many times in the past year have you    Used an illegal drug or used a prescription medication for non-medical reasons?  Never Filed at: 12/28/2022 1417          Wells' Criteria for PE    Flowsheet Row Most Recent Value   Wells' Criteria for PE    Clinical signs and symptoms of DVT 0 Filed at: 12/28/2022 1523   PE is primary diagnosis or equally likely 3 Filed at: 12/28/2022 1523   HR >100 0 Filed at: 12/28/2022 1523 Immobilization at least 3 days or Surgery in the previous 4 weeks 0 Filed at: 12/28/2022 1523   Previous, objectively diagnosed PE or DVT 1 5 Filed at: 12/28/2022 1523   Hemoptysis 0 Filed at: 12/28/2022 1523   Malignancy with treatment within 6 months or palliative 0 Filed at: 12/28/2022 1523   Wells' Criteria Total 4 5 Filed at: 12/28/2022 1523            [facility]  Number of Diagnoses or Management Options  Cough  Influenza  Nausea  Diagnosis management comments: Imaging negative for bacterial PNA/PE  Labs and EKG negative for ACS  Pt found to be flu positive which is likely the reason for her SOB  Will begin tamiflu course  Pt remained stable while in the department  Advised Pt to follow up with her primary team for further management and guidance  Appropriate for d/c home with strict return precautions  Disposition  Final diagnoses:   Influenza   Nausea   Cough     Time reflects when diagnosis was documented in both MDM as applicable and the Disposition within this note     Time User Action Codes Description Comment    12/28/2022  3:16 PM Phoebe Brockton [J11 1] Influenza     12/28/2022  3:17 PM Phoebe Brockton [R11 0] Nausea     12/28/2022  3:17 PM Levada Maya Add [R05 9] Cough       ED Disposition     ED Disposition   Discharge    Condition   Stable    Date/Time   Wed Dec 28, 2022  3:22 PM    Comment   Juan Pablo Sanchez discharge to home/self care                 Follow-up Information     Follow up With Specialties Details Why Contact Info Additional Information    [facility] Emergency Medicine Go to  If symptoms worsen [address] Emergency Department, [place], [place], [address]    Mali Estrada MD Family Medicine Schedule an appointment as soon as possible for a visit  As needed [address]  [address] [facility] [address]  [facility] [address] Discharge Medication List as of 12/28/2022  3:31 PM      START taking these medications    Details   benzonatate (TESSALON PERLES) 100 mg capsule Take 1 capsule (100 mg total) by mouth 2 (two) times a day as needed for cough for up to 7 days, Starting Wed 12/28/2022, Until Wed 1/4/2023 at 2359, Normal      ondansetron (ZOFRAN) 4 mg tablet Take 1 tablet (4 mg total) by mouth every 12 (twelve) hours as needed for nausea or vomiting for up to 5 days, Starting Wed 12/28/2022, Until Mon 1/2/2023 at 2359, Normal      oseltamivir (TAMIFLU) 75 mg capsule Take 1 capsule (75 mg total) by mouth every 12 (twelve) hours for 5 days, Starting Wed 12/28/2022, Until Mon 1/2/2023, Normal         CONTINUE these medications which have NOT CHANGED    Details   ALPRAZolam (XANAX) 0 5 mg tablet Take 1 tablet (0 5 mg total) by mouth 4 (four) times a day as needed for anxiety, Starting Tue 10/11/2022, Normal      rivaroxaban (Xarelto) 20 mg tablet Take 1 tablet (20 mg total) by mouth daily with breakfast, Starting Wed 12/28/2022, Normal           No discharge procedures on file  PDMP Review       Value Time User    PDMP Reviewed  Yes 9/20/2022  1:47 AM Dickie Nageotte, [facility]           ED Provider  Attending physically available and evaluated Rae Moore  I managed the patient along with the ED Attending      Electronically Signed by         Vick Morocho MD  12/28/22 5501

## 2022-12-28 NOTE — TELEPHONE ENCOUNTER
Called pt to advise her chest xray was negative and jordan suggests ER eval to rule out PE  Pt aware of recommendation but wishes to get doppler done and 'go from there'  Advised her I would review follow up instructions with Byron Laughter and send her a MyChart message letting her know

## 2022-12-28 NOTE — PROGRESS NOTES
[address] [facility]  HEMATOLOGY ONCOLOGY SPECIALISTS [place] [address] [facility] [id number]  Hematology Ambulatory Consult  Macarena Nichols, 1970, [id number]  12/28/2022    Assessment/Plan:  1  Acute deep vein thrombosis (DVT) of calf muscle vein of right lower extremity (HCC)  2  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  3  Other acute pulmonary embolism without acute cor pulmonale ([address] )  Ms Ana Ding is a 27-year-old female seen in consultation for thrombophilia work-up and anticoagulation recommendations after being diagnosed with an acute DVT in the right calf as well as thrombophlebitis of the right lower extremity  She was also having shortness of breath and found to have multiple pulmonary emboli in the right lung with no evidence of RV strain  She was treated with 3 months of anticoagulation with Eliquis  She did not tolerate this well and experienced significant fatigue, muscle weakness, and weight gain with excess antral bleeding  She continues with chest tightness and cough along with continued pain of both lower extremities  See orders below  We did discuss that if she does have continued clotting or a thrombophilia diagnosis lifelong anticoagulation would be recommended  We discussed that since she did not tolerate Eliquis I would not suggest retrial and we would attempt Xarelto  I explained that if she also does not tolerate Xarelto the only alternative would be Coumadin  She wishes to not be on anticoagulation at this time and we agreed that we will have further discussion regarding these recommendations based on the results of the orders below and discussion with vascular  - Thrombosis Panel; Future  - D-dimer, quantitative; Future  - CBC and differential; Future  - Comprehensive metabolic panel; Future  - XR chest pa & lateral; Future    4   Pain and swelling of lower leg, unspecified laterality  She sees vascular surgery later today for further discussion of her varicose veins  We discussed that due to continued pain of her right leg as well as some occasional pain in her left leg we will get a bilateral venous duplex to rule out clot propagation or chronic DVT  - VAS lower limb venous duplex study, complete bilateral; Future    5  Acute cough  6  Shortness of breath  She states that she does have a slight cold as her children are also sick and she works in a school  We discussed that if there is no evidence of pneumonia on her chest x-ray she will be sent to the emergency room for work-up for PE     - XR chest pa & lateral; Future    Will determine further follow-up based on work-up above  Patient voiced agreement and understanding to the above  Patient knows to call the Hematology/Oncology office with any questions and concerns regarding the above  Barrier(s) to care: None  The patient is able to self care     -------------------------------------------------------------------------------------------------------    Chief Complaint   Patient presents with   • Consult       Referring provider:  Tyrese Anderson [address]   [address]    History of present illness:  Bacilio Lowry is a 79-year-old female with no significant past medical history seen in consultation after being diagnosed with DVT with thrombophlebitis and pulmonary embolism in September 2022  On 9/19/2022 she was seen in the emergency room for increased varicose veins of the right lower extremity with pain  She was found to have a acute occlusive DVT of the soleal vein in the right calf with superficial thrombophlebitis in the anterior accessory saphenous vein at the proximal and mid thigh as well as the varicosities in the distal lateral thigh    CTA PE demonstrated multiple pulmonary emboli the largest within the distal right main artery extending to the right middle and lower lobe are branches and pulmonary emboli within the segmental branches within the bilateral upper and lower lobes  No evidence of RV strain  Thrombosis panel was drawn at that time during an acute clot demonstrated a slightly decreased Antithrombin III activity otherwise normal   She was discharged on Eliquis for 3 months and took her last dose on 2022  She states that she did not tolerate this very well as it made her feel very fatigued, severe muscle weakness, and states that she gained about 18 pounds since starting the medication  She states that she still has continued swelling and pain of the right lower extremity with some occasional pain in the left as well  She currently has a slight cold but also feels very short of breath with a persistent cough, similar to when she was first diagnosed  States that while on Eliquis she did have heavy menses  Prior to being diagnosed with a DVT she had a 6-hour car ride as well as a trip to [facility] in the months leading up to this event  She also has chronic varicose veins  She believes her mother possibly had a history of VTE but is unsure of any details  No other family history of VTE  She has no kidney or liver problems  She has never had extensive surgeries other than C-sections or been hospitalized other than for pregnancies  She denies any estrogen or steroid use  She is up-to-date on all of her cancer screenings  She does not smoke  She has no history of CVA or MI  She does have a history of 1 miscarriage  She has no known autoimmune conditions  She her paternal grandmother  of lung cancer no other cancers noted in her family history  Review of Systems   Constitutional: Positive for activity change, diaphoresis, fatigue and unexpected weight change (weight gain)  Negative for appetite change and fever  HENT: Negative for trouble swallowing and voice change  Eyes: Negative for photophobia and visual disturbance     Respiratory: Positive for cough, chest tightness and shortness of breath  Cardiovascular: Negative for chest pain, palpitations and leg swelling  Gastrointestinal: Negative for abdominal distention, blood in stool, constipation, diarrhea and nausea  Endocrine: Negative for cold intolerance and heat intolerance  Genitourinary: Positive for menstrual problem  Negative for dysuria, hematuria and urgency  Musculoskeletal: Positive for arthralgias and myalgias  Negative for back pain, gait problem and joint swelling  Skin: Negative for pallor and rash  Neurological: Positive for weakness  Negative for dizziness, light-headedness and headaches  Hematological: Negative for adenopathy  Does not bruise/bleed easily  Psychiatric/Behavioral: Negative for confusion and sleep disturbance         Patient Active Problem List   Diagnosis   • Anxiety   • Keratosis pilaris   • Primary insomnia   • Varicose veins of leg with pain, right   • 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   • Pulmonary emboli (HCC)   • Migraines   • DVT, lower extremity, distal, acute, right (HCC)       Past Medical History:   Diagnosis Date   • Allergic     as long as i can remember dust pollen etc   • Anemia     i can tend to be anemic i control through diet & supplementn   • Anxiety     as long as i can remember   • GERD (gastroesophageal reflux disease)     i have it on occasion   • Headache(784 0)     i get tension headaches alot       Past Surgical History:   Procedure Laterality Date   •  SECTION  2005    2nd son's birth   • TUBAL LIGATION      after second son was born in 48       Family History   Problem Relation Age of Onset   • Hypertension Father    • Breast cancer Paternal Grandmother    • Diabetes Paternal Grandmother    • Substance Abuse Neg Hx    • Mental illness Neg Hx        Social History     Socioeconomic History   • Marital status: /Civil Union     Spouse name: Not on file   • Number of children: Not on file   • Years of education: Not on file   • Highest education level: Not on file   Occupational History   • Not on file   Tobacco Use   • Smoking status: Former     Packs/day: 0 25     Types: Cigarettes   • Smokeless tobacco: Never   • Tobacco comments:     havent smoked in a long time   Vaping Use   • Vaping Use: Every day   • Substances: Flavoring   Substance and Sexual Activity   • Alcohol use: Not Currently     Comment: socially   • Drug use: Never   • Sexual activity: Yes     Partners: Male     Birth control/protection: Surgical     Comment: Tubal ligation   Other Topics Concern   • Not on file   Social History Narrative   • Not on file     Social Determinants of Health     Financial Resource Strain: Not on file   Food Insecurity: Not on file   Transportation Needs: Not on file   Physical Activity: Not on file   Stress: Not on file   Social Connections: Not on file   Intimate Partner Violence: Not on file   Housing Stability: Not on file         Current Outpatient Medications:   •  ALPRAZolam (XANAX) 0 5 mg tablet, Take 1 tablet (0 5 mg total) by mouth 4 (four) times a day as needed for anxiety, Disp: 120 tablet, Rfl: 5    Allergies   Allergen Reactions   • Iodine I 131 Tositumomab Rash     OK for CT per Dr Zhen Gonzalez   • Doxycycline GI Intolerance and Vomiting   • Latex    • Other Hives       Objective:  /82   Pulse 77   Temp 98 °F (36 7 °C)   Resp 18   Ht 5' 5" (1 651 m)   Wt 97 5 kg (215 lb)   SpO2 97%   BMI 35 78 kg/m²   Physical Exam  Constitutional:       General: She is not in acute distress  Appearance: Normal appearance  She is not ill-appearing  HENT:      Head: Normocephalic and atraumatic  Eyes:      Extraocular Movements: Extraocular movements intact  Conjunctiva/sclera: Conjunctivae normal    Cardiovascular:      Rate and Rhythm: Normal rate  Pulmonary:      Effort: Pulmonary effort is normal  No respiratory distress  Abdominal:      General: There is no distension        Tenderness: There is no abdominal tenderness  Musculoskeletal:         General: No swelling  Cervical back: Normal range of motion  Skin:     General: Skin is warm and dry  Capillary Refill: Capillary refill takes less than 2 seconds  Coloration: Skin is not jaundiced or pale  Neurological:      General: No focal deficit present  Mental Status: She is alert and oriented to person, place, and time  Mental status is at baseline  Motor: No weakness  Gait: Gait normal    Psychiatric:         Mood and Affect: Mood normal          Behavior: Behavior normal          Thought Content:  Thought content normal          Judgment: Judgment normal          Result Review  Labs:   Admission on 09/19/2022, Discharged on 09/20/2022   Component Date Value Ref Range Status   • Ventricular Rate 09/19/2022 84  BPM Final   • Atrial Rate 09/19/2022 84  BPM Final   • SD Interval 09/19/2022 152  ms Final   • QRSD Interval 09/19/2022 70  ms Final   • QT Interval 09/19/2022 376  ms Final   • QTC Interval 09/19/2022 444  ms Final   • P Axis 09/19/2022 57  degrees Final   • QRS Axis 09/19/2022 -12  degrees Final   • T Wave Axis 09/19/2022 54  degrees Final   • WBC 09/19/2022 7 83  4 31 - 10 16 Thousand/uL Final   • RBC 09/19/2022 4 25  3 81 - 5 12 Million/uL Final   • Hemoglobin 09/19/2022 11 3 (L)  11 5 - 15 4 g/dL Final   • Hematocrit 09/19/2022 36 6  34 8 - 46 1 % Final   • MCV 09/19/2022 86  82 - 98 fL Final   • MCH 09/19/2022 26 6 (L)  26 8 - 34 3 pg Final   • MCHC 09/19/2022 30 9 (L)  31 4 - 37 4 g/dL Final   • RDW 09/19/2022 14 4  11 6 - 15 1 % Final   • MPV 09/19/2022 9 5  8 9 - 12 7 fL Final   • Platelets 49/47/1736 307  149 - 390 Thousands/uL Final   • nRBC 09/19/2022 0  /100 WBCs Final   • Neutrophils Relative 09/19/2022 64  43 - 75 % Final   • Immat GRANS % 09/19/2022 0  0 - 2 % Final   • Lymphocytes Relative 09/19/2022 23  14 - 44 % Final   • Monocytes Relative 09/19/2022 9  4 - 12 % Final   • Eosinophils Relative 09/19/2022 3  0 - 6 % Final   • Basophils Relative 09/19/2022 1  0 - 1 % Final   • Neutrophils Absolute 09/19/2022 4 95  1 85 - 7 62 Thousands/µL Final   • Immature Grans Absolute 09/19/2022 0 02  0 00 - 0 20 Thousand/uL Final   • Lymphocytes Absolute 09/19/2022 1 83  0 60 - 4 47 Thousands/µL Final   • Monocytes Absolute 09/19/2022 0 69  0 17 - 1 22 Thousand/µL Final   • Eosinophils Absolute 09/19/2022 0 24  0 00 - 0 61 Thousand/µL Final   • Basophils Absolute 09/19/2022 0 10  0 00 - 0 10 Thousands/µL Final   • Sodium 09/19/2022 139  135 - 147 mmol/L Final   • Potassium 09/19/2022 3 8  3 5 - 5 3 mmol/L Final   • Chloride 09/19/2022 105  96 - 108 mmol/L Final   • CO2 09/19/2022 26  21 - 32 mmol/L Final   • ANION GAP 09/19/2022 8  4 - 13 mmol/L Final   • BUN 09/19/2022 16  5 - 25 mg/dL Final   • Creatinine 09/19/2022 0 72  0 60 - 1 30 mg/dL Final    Standardized to IDMS reference method   • Glucose 09/19/2022 86  65 - 140 mg/dL Final    If the patient is fasting, the ADA then defines impaired fasting glucose as > 100 mg/dL and diabetes as > or equal to 123 mg/dL  Specimen collection should occur prior to Sulfasalazine administration due to the potential for falsely depressed results  Specimen collection should occur prior to Sulfapyridine administration due to the potential for falsely elevated results  • Calcium 09/19/2022 8 9  8 4 - 10 2 mg/dL Final   • AST 09/19/2022 12 (L)  13 - 39 U/L Final    Specimen collection should occur prior to Sulfasalazine administration due to the potential for falsely depressed results  • ALT 09/19/2022 7  7 - 52 U/L Final    Specimen collection should occur prior to Sulfasalazine administration due to the potential for falsely depressed results      • Alkaline Phosphatase 09/19/2022 92  34 - 104 U/L Final   • Total Protein 09/19/2022 7 3  6 4 - 8 4 g/dL Final   • Albumin 09/19/2022 4 0  3 5 - 5 0 g/dL Final   • Total Bilirubin 09/19/2022 0 41  0 20 - 1 00 mg/dL Final   • eGFR 09/19/2022 97  ml/min/1 73sq m Final   • hs TnI 0hr 09/19/2022 <2  "Refer to ACS Flowchart"- see link ng/L Final    Comment:                                              Initial (time 0) result  If >=50 ng/L, Myocardial injury suggested ;  Type of myocardial injury and treatment strategy  to be determined  If 5-49 ng/L, a delta result at 2 hours and or 4 hours will be needed to further evaluate  If <4 ng/L, and chest pain has been >3 hours since onset, patient may qualify for discharge based on the HEART score in the ED  If <5 ng/L and <3hours since onset of chest pain, a delta result at 2 hours will be needed to further evaluate  HS Troponin 99th Percentile URL of a Health Population=12 ng/L with a 95% Confidence Interval of 8-18 ng/L  Second Troponin (time 2 hours)  If calculated delta >= 20 ng/L,  Myocardial injury suggested ; Type of myocardial injury and treatment strategy to be determined  If 5-49 ng/L and the calculated delta is 5-19 ng/L, consult medical service for evaluation  Continue evaluation for ischemia on ecg and other possible etiology and repeat hs troponin at 4 hours  If delta                            is <5 ng/L at 2 hours, consider discharge based on risk stratification via the HEART score (if in ED), or CORY risk score in IP/Observation  HS Troponin 99th Percentile URL of a Health Population=12 ng/L with a 95% Confidence Interval of 8-18 ng/L  • Extra Tube 09/19/2022 Hold for add-ons  Final    Auto resulted  • hs TnI 2hr 09/19/2022 <2  "Refer to ACS Flowchart"- see link ng/L Final    Comment:                                              Initial (time 0) result  If >=50 ng/L, Myocardial injury suggested ;  Type of myocardial injury and treatment strategy  to be determined  If 5-49 ng/L, a delta result at 2 hours and or 4 hours will be needed to further evaluate    If <4 ng/L, and chest pain has been >3 hours since onset, patient may qualify for discharge based on the HEART score in the ED  If <5 ng/L and <3hours since onset of chest pain, a delta result at 2 hours will be needed to further evaluate  HS Troponin 99th Percentile URL of a Health Population=12 ng/L with a 95% Confidence Interval of 8-18 ng/L  Second Troponin (time 2 hours)  If calculated delta >= 20 ng/L,  Myocardial injury suggested ; Type of myocardial injury and treatment strategy to be determined  If 5-49 ng/L and the calculated delta is 5-19 ng/L, consult medical service for evaluation  Continue evaluation for ischemia on ecg and other possible etiology and repeat hs troponin at 4 hours  If delta                            is <5 ng/L at 2 hours, consider discharge based on risk stratification via the HEART score (if in ED), or CORY risk score in IP/Observation  HS Troponin 99th Percentile URL of a Health Population=12 ng/L with a 95% Confidence Interval of 8-18 ng/L  • Delta 2hr hsTnI 09/19/2022    Final    Unable to Calculate   • AntiThrombIN III Activity 09/19/2022 90 (L)  92 - 136 % of Normal Final   • ANTICARDIOLIPIN IGG ANTIBODY 09/19/2022 1 0  See comment Final   • ANTICARDIOLIPIN IGA ANTIBODY 09/19/2022 2 3  See comment Final   • ANTICARDIOLIPIN IGM ANTIBODY 09/19/2022 4 6  See comment Final   • Factor V Leiden 09/19/2022 Comment   Final    Comment: Result: c 1601G>A (p [id number]) - Not Detected  This result is not associated with an increased risk for venous  thromboembolism  See Additional Clinical Information and  Comments  Additional Clinical Information:  Venous thromboembolism is a multifactorial disease influenced by  genetic, environmental, and circumstantial risk factors  The c 1601G>A  (p  [id number]) variant in the F5 gene, commonly referred to as Factor  V Leiden, is a genetic risk factor for venous thromboembolism  Heterozygous carriers of this variant have a 6- to 8-fold increased  risk for venous thromboembolism   Individuals homozygous for this  variant (ie, with a copy of the variant on each chromosome) have an  approximately 80-fold increased risk for venous thromboembolism  Individuals who carry both a c *97G>A variant in the F2 gene and  Factor V Leiden have an approximately 20-fold increased risk for  venous thromboembolism  Risks are likely to be even higher in more  complex genotype combinations involving                            the F2 c *97G>A variant and  Factor V Leiden (PMID: [id number])  Additional risk factors include but  are not limited to: deficiency of protein C, protein S, or  antithrombin III, age, male sex, personal or family history of deep  vein thromboembolism, smoking, surgery, prolonged immobilization,  malignant neoplasm, tamoxifen treatment, raloxifene treatment, oral  contraceptive use, hormone replacement therapy, and pregnancy  Management of thrombotic risk and thrombotic events should follow  established guidelines and fit the clinical circumstance  This result  cannot predict the occurrence or recurrence of a thrombotic event  Comment:  Genetic counseling is recommended to discuss the potential clinical  implications of positive results, as well as recommendations for  testing family members  Genetic Coordinators are available for health care providers to  discuss results at [phone])  Test Details:  Variant Analyzed: c 1601G>A (p  [id number]), referred to as Factor                            V  Leiden  Methods/Limitations:  DNA analysis of the F5 gene ([id number]) was performed by PCR  amplification followed by restriction enzyme analysis  The diagnostic  sensitivity is >99%  Results must be combined with clinical  information for the most accurate interpretation  Molecular-based  testing is highly accurate, but as in any laboratory test, diagnostic  errors may occur   False positive or false negative results may occur  for reasons that include genetic variants, blood transfusions, bone  marrow transplantation, somatic or tissue-specific mosaicism,  mislabeled samples, or erroneous representation of family  relationships  This test was developed and its performance characteristics  determined by Matt Elias  It has not been cleared or approved by the  Food and Drug Administration  References:  Rolando Lacy; [facility] Professional  Practice and Guidelines Committee  Addendum: Rolo Harris U  49  consensus statement on factor                            V Leiden mutation  testing  [facility]  2021 Mar 5  doi: 10 [id number]  PMID: [id number]  Glen Hua  Factor V Leiden Thrombophilia  1999 May 14  [Updated 2018 Jan 4]  In: Danilo River, [facility], Carmen SHEPPARD, et al ,  editors  Sima(R) [Internet]  [place] [address] ([facility]): [place] [address]; [phone]  Available from:  [facility] emilee Echols, Lita Harris, Noel SANTOYO;  [facility] Laboratory  Committee  Venous thromboembolism  laboratory testing (factor V Leiden and factor II c *97G>A), 2018  update: a technical standard of the [address] (AC)  [facility]  2018 Dec;20(12):8987-0715   doi: 08 [id number]  Epub 2018 Oct 5  PMID: [id number]  Janifer Fleischer, PhD, Jaya Fernandez, PhD  William Ratliff, PhD, Kaylen Miles, PhD, Diana Cruz, PhD, Noelle Farley, PhD, Maggie Manley, PhD, Joselyn Galarza, PhD,                            [facility] [place]    • PTT Lupus Anticoagulant 09/19/2022 34 8  0 0 - 51 9 sec Final   • Dilute Viper Venom Time 09/19/2022 37 6  0 0 - 47 0 sec Final   • DILUTE PROTHROMBIN TIME(DPT) 09/19/2022 36 9  0 0 - 47 6 sec Final   • THROMBIN TIME (DRVW) 09/19/2022 18 1  0 0 - 23 0 sec Final   • DPT CONFIRM RATIO 09/19/2022 1 01  0 00 - 1 34 Ratio Final   • LUPUS REFLEX INTERPRETATION 09/19/2022 Comment:   Final    No lupus anticoagulant was detected  • Protein C Activity 09/19/2022 143 0  60 - 150 % of Normal Final   • PROTEIN S ACTIVITY 09/19/2022 98  68 - 108 % Final   • Protein S Ag, Total 09/19/2022 80  60 - 150 % Final    This test was developed and its performance characteristics  determined by Davy Hernandez  It has not been cleared or approved  by the Food and Drug Administration  • Protein S Ag, Free 09/19/2022 115  61 - 136 % Final   • Prothrombin Mutation 09/19/2022 Comment   Final    Comment: Result: c *97G>A - Not Detected  This result is not associated with an increased risk for venous  thromboembolism  See Additional Clinical Information and  Comments  Additional Clinical Information:  Venous thromboembolism is a multifactorial disease influenced by  genetic, environmental, and circumstantial risk factors  The c *97G>A  variant in the F2 gene is a genetic risk factor for venous  thromboembolism  Heterozygous carriers have a 2- to 4-fold increased  risk for venous thromboembolism  Homozygotes for the c *97G>A variant  are rare  The annual risk of VTE in homozygotes has been reported to  be 1 1%/year  Individuals who carry both a c *97G>A variant in the  F2 gene and a c 1601G>A (p  [id number]) variant in the F5 gene  (commonly referred to as Factor V Leiden) have an approximately 20-  fold increased risk for venous thromboembolism  Risks are likely to  be even higher in more complex genotype combinations involving the  F2 c *97G>A variant and Factor V Leiden (PMID:                            [id number])  Additional  risk factors include but are not limited to: deficiency of protein C,  protein S, or antithrombin III, age, male sex, personal or family  history of deep vein thromboembolism, smoking, surgery, prolonged  immobilization, malignant neoplasm, tamoxifen treatment, raloxifene  treatment, oral contraceptive use, hormone replacement therapy, and  pregnancy   Management of thrombotic risk and thrombotic events should  follow established guidelines and fit the clinical circumstance  This  result cannot predict the occurrence or recurrence of a thrombotic  event  Comments:  Genetic counseling is recommended to discuss the potential clinical  implications of positive results, as well as recommendations for  testing family members  Genetic Coordinators are available for health care providers to discuss  results at [phone]  Test Details:  Variant analyzed: c *97G>A, previously referred to as [id number]  Methods/Limitations:  DNA analysis of the F2 gene                            ([id number]  5) was performed by PCR  amplification followed by restriction enzyme analysis  The diagnostic  sensitivity is >99%  Results must be combined with clinical  information for the most accurate interpretation  Molecular-based  testing is highly accurate, but as in any laboratory test, diagnostic  errors may occur  False positive or false negative results may occur  for reasons that include genetic variants, blood transfusions, bone  marrow transplantation, somatic or tissue-specific mosaicism,  mislabeled samples, or erroneous representation of family  relationships  This test was developed and its performance characteristics determined  by Esther Humphries  It has not been cleared or approved by the Food and Drug  Administration  References:  [facility], Unice Merlin; [facility] Professional  Practice and Guidelines Committee  Addendum: Rolo Harris U  51  consensus statement on factor V Leiden mutation  testing  [facility]  2021 Mar 5  doi:                            [id number]  PMID: [id number]  [facility]  Prothrombin Thrombophilia  2006 Jul 25  [Updated 2021 Feb 4]  In: Heri Randolph, Abril MCMAHON, Carmen SHEPPARD, et al ,  editors  Lyndon(R) [Internet]  [place] [address] ([facility]): [facility] ; [phone]   Available from:  Leisa Cedeno, Skyista Faheem CS;  [facility] Laboratory  Committee  Venous thromboembolism  laboratory testing (factor V Leiden and factor II c *97G>A),  2018 update: a technical standard of the [address] ([facility])  [facility]  2018 Dec;20(12):4816-0770   doi: [id number]  Epub 2018 Oct 5  PMID: [id number]    Gene Correa, PhD, Subhash Arthur, PhD  Trinity Moore, PhD, Urbano Wilkerson, PhD, Zafar Velasquez, PhD, Johan Trejo, PhD, Carey Tobar, PhD, Tinnie Duane, PhD, [facility], [facility]    • BETA 2 GLYCO 1 IGG 09/19/2022 0 8  See comment Final   • BETA 2 GLYCO 1 IGA 09/19/2022 1 4  See comment Final   • BETA 2 GLYCO 1 IGM 09/19/2022 <2 9  See comment Final   • A4C EF 09/20/2022 63  % Final   • LVIDd 09/20/2022 4 50  cm Final   • LVIDS 09/20/2022 2 10  cm Final   • IVSd 09/20/2022 0 90  cm Final   • LVPWd 09/20/2022 1 00  cm Final   • FS 09/20/2022 53  28 - 44 Final   • MV E' Tissue Velocity Septal 09/20/2022 11  cm/s Final   • E wave deceleration time 09/20/2022 183  ms Final   • MV Peak E Abner 09/20/2022 80  cm/s Final   • MV Peak A Abner 09/20/2022 0 59  m/s Final   • RVID d 09/20/2022 3 5  cm Final   • LA size 09/20/2022 3 2  cm Final   • LA length (A2C) 09/20/2022 4 40  cm Final   • RA 2D Volume 09/20/2022 27 0  mL Final   • RAA A4C 09/20/2022 12 5  cm2 Final   • MV stenosis pressure 1/2 time 09/20/2022 53  ms Final   • MV valve area p 1/2 method 09/20/2022 4 15   Final   • TR Peak Abner 09/20/2022 2 4  m/s Final   • Triscuspid Valve Regurgitation Pea* 09/20/2022 24 0  mmHg Final   • Ao root 09/20/2022 3 00  cm Final   • Asc Ao 09/20/2022 3 1  cm Final   • Tricuspid valve peak regurgitation* 09/20/2022 2 43  m/s Final   • Left ventricular stroke volume (2D) 09/20/2022 77 00  mL Final   • IVS 09/20/2022 0 9  cm Final   • LEFT VENTRICLE SYSTOLIC VOLUME (MO* 58/14/8491 15  mL Final   • LV DIASTOLIC VOLUME (MOD BIPLANE) * 09/20/2022 91  mL Final   • Left Atrium Area-systolic Four Nanci*  18  cm2 Final   • Left Atrium Area-systolic Apical T*  12 8  cm2 Final   • LVSV, 2D 2022 77  mL Final   • LV EF 2022 60   Final   • SARS-CoV-2 2022 Negative  Negative Final        • INFLUENZA A PCR 2022 Negative  Negative Final        • INFLUENZA B PCR 2022 Negative  Negative Final        • RSV PCR 2022 Negative  Negative Final        • POC Glucose 2022 96  65 - 140 mg/dl Final   • Sodium 2022 135  135 - 147 mmol/L Final   • Potassium 2022 3 5  3 5 - 5 3 mmol/L Final   • Chloride 2022 106  96 - 108 mmol/L Final   • CO2 2022 23  21 - 32 mmol/L Final   • ANION GAP 2022 6  4 - 13 mmol/L Final   • BUN 2022 11  5 - 25 mg/dL Final   • Creatinine 2022 0 68  0 60 - 1 30 mg/dL Final    Standardized to IDMS reference method   • Glucose 2022 120  65 - 140 mg/dL Final    If the patient is fasting, the ADA then defines impaired fasting glucose as > 100 mg/dL and diabetes as > or equal to 123 mg/dL  Specimen collection should occur prior to Sulfasalazine administration due to the potential for falsely depressed results  Specimen collection should occur prior to Sulfapyridine administration due to the potential for falsely elevated results     • Calcium 2022 7 9 (L)  8 4 - 10 2 mg/dL Final   • eGFR 2022 102  ml/min/1 73sq m Final   • WBC 2022 6 60  4 31 - 10 16 Thousand/uL Final   • RBC 2022 3 81  3 81 - 5 12 Million/uL Final   • Hemoglobin 2022 10 1 (L)  11 5 - 15 4 g/dL Final   • Hematocrit 2022 32 8 (L)  34 8 - 46 1 % Final   • MCV 2022 86  82 - 98 fL Final   • MCH 2022 26 5 (L)  26 8 - 34 3 pg Final   • MCHC 2022 30 8 (L)  31 4 - 37 4 g/dL Final   • RDW 2022 14 5  11 6 - 15 1 % Final   • Platelets  253  149 - 390 Thousands/uL Final   • MPV 2022 9 5  8 9 - 12 7 fL Final       Imagin/19/22: VAS lower limb  RIGHT LOWER LIMB  Evidence of acute occlusive deep vein thrombosis noted in the soleal vein in  the calf  Evidence of superficial thrombophlebitis noted in the anterior accessory  saphenous vein at the proximal and mid thigh, as well as varicosities in the  distal lateral thigh  Doppler evaluation shows a normal response to augmentation maneuvers  Popliteal and anterior tibial arterial Doppler waveforms are triphasic  LEFT LOWER LIMB LIMITED  Evaluation shows no evidence of thrombus in the common femoral vein  Doppler evaluation shows a normal response to augmentation maneuvers  9/19/22: CTA PE   IMPRESSION:  Multiple pulmonary emboli the largest within the distal right main pulmonary artery extending into right middle and lower lobar branches and pulmonary emboli within segmental branches within bilateral upper and lower lobes  Measured RV/LV ratio is within   normal limits at less than 0 9  Please note: This report has been generated by a voice recognition software system  Therefore there may be syntax, spelling, and/or grammatical errors  Please call if you have any questions

## 2022-12-28 NOTE — PATIENT INSTRUCTIONS
Shortness of breath  -go to ED to rule out PE        R LE DVT  -continue with compression stockings  -continue with anticoagulation; switching from Eliquis to Xarelto 20

## 2022-12-28 NOTE — PROGRESS NOTES
Assessment/Plan:    DVT, lower extremity, distal, acute, right (HCC)  -     Compression Stocking  -     rivaroxaban (Xarelto) 20 mg tablet; Take 1 tablet (20 mg total) by mouth daily with breakfast  -     VAS reflux lower limb venous duplex study with reflux assessment, complete bilateral; Future    -R LE DVT/pulmonary emboli 9/2022  -Continues to have bilateral LE pain and edema R >L  -Legs are chronically painful and tender  -Off of anticoagulation past week for thrombosis panel and developed increased SOB  -1+ LE edema L>R    - LEV 9/19/22: R LE with acute occlusive DVT in the soleal vein  Superficial thrombophlebitis in the anterior accessory SV at the proximal and mid thigh, as well as varicosities in the distal lateral thigh        Plan:  -Evaluate SOB in ED to r/o PE  -Check venous duplex to rule out DVT with ongoing leg pain and edema  -Medical compression stockings should be worn daily to help with leg pain and edema  -Given PE, should continue with anticoagulation for 6 months, but will defer to hematology  -Switch apixaban 5 BID to rivaroxaban 20 (patient request d/t fatigue, low energy with apixaban)  -Xarelto 20 mg once daily (requires prior auth); samples for 4 weeks given in the office  -Pulmonary evaluation for continued SOB after treatment for VTE  -Seen by hematology today with thrombosis panel pending      Varicose veins in the right leg with pain   Bilateral leg pain and edema  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  -     Compression Stocking  -     VAS reflux lower limb venous duplex study with reflux assessment, complete bilateral; Future    -R calf DVT/PE with SVT and bulging varicose veins in the R leg  -R thigh SVT appears to have resolved  -continued bilateral R >L leg swelling    -continue with conservative measures, including medical compression for varicose veins  -patient education regarding varicose veins      Other acute pulmonary embolism with acute cor pulmonale ([facility] [address] )  -     Ambulatory Referral to Pulmonology; Future  -     rivaroxaban (Xarelto) 20 mg tablet; Take 1 tablet (20 mg total) by mouth daily with breakfast  -ongoing SOB  -recommend pulmonary evaluation      Shortness of breath  -acute on chronic SOB  -sick contacts  -high risk  -refer to ED  Addendum:  ED evaluation noted  Patient was negative for DVT  No evidence of acute or chronic DVT in either extremity  Right soleal DVT resolved  She also had a CTA of the chest which was negative for pulmonary embolism  She was found to be positive for flu  Since PE/DVT have resolved, will complete course of full anticoagulation  She wanted to try Xarelto over Eliquis since she believes the Eliquis is making her weak with low energy  She had been given samples for 4 weeks of Xarelto 20 mg   I contacted hematology regarding need for DVT prophylaxis with Xarelto 10 mg  There was a mild abnormality on prior thrombosis panel with repeat panel drawn today and pending  Given ongoing bilateral leg pain and edema, recommend compression and will check reflux study for completeness  Subjective:      Patient ID: Isaac Contreras is a 50 y o  female  Pt is here for 3 month f/u  Pt c/o dorie itchiness, swelling and pain in calfs  Pt occ wears compression socks  Pt is a former smoker  HPI    Isaac Contreras 57-year-old female former smoker  migraines, anxiety and RLE varicose veins who suffered a right calf, soleal vein DVT, right lower extremity SVT and pulmonary emboli found on imaging September 19, 2022  She has underlying varicose veins which were firm and hard, had a 6 hour care ride with right leg pain and swelling for a couple of weeks prior to DVT  Drawn at the time of VTE and found to have slightly decreased Antithrombin III activity  She was placed on full anticoagulation with apixaban  No family history of DVT or known hypercoagulable state      She stopped apixaban 1 week ago and underwent hematologic testing  Since stopping apixaban, she has had continued leg pain, swelling and increased shortness of breath  Her kids are sick and feels she may  have a cold or respiratory infection  She is requesting bronchodilator, but ED evaluation is recommended given her history  She was seen by hematology this morning who sent her for CXR and also recommends ED eval for SOB  Patient reports that she had a difficult time with apixaban 5 BID due to menstrual bleeding and feels the anticoagulant has made her tired  Additionally, she continues to report bilateral leg pain, continued swelling and feeling like the legs are "exhausted "  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  The legs are "tight", swell and she finds it difficult to get through the day  At times, the R thigh varicosities are tender  The firm R thigh veins since had at the time of DVT have softened  She has thigh high and BTK compression but she is not tolerated the compression well as she feels that they haven't helped and actually worsen right leg pain at times  She feels generally tired and also gained 18 # since starting the anticoagulant           -Off of Eliquis since 12/20 with increased leg pain and SOB; ?URI; given history will need to rule out PE  -BW this am for hematology  -Legs generally are tight and uncomfortable        CTA - CHEST WITH IV CONTRAST - PULMONARY ANGIOGRAM 9/19/22:  Multiple pulmonary emboli the largest within the distal right main pulmonary artery extending into right middle and lower lobar branches and pulmonary emboli within segmental branches within bilateral upper and lower lobes  Measured RV/LV ratio is within  normal limits at less than 0 9  LEV duplex 9/19/22:  RIGHT LOWER LIMB  Evidence of acute occlusive deep vein thrombosis noted in the soleal vein in  the calf    Evidence of superficial thrombophlebitis noted in the anterior accessory  saphenous vein at the proximal and mid thigh, as well as varicosities in the  distal lateral thigh  Doppler evaluation shows a normal response to augmentation maneuvers  Popliteal and anterior tibial arterial Doppler waveforms are triphasic  LEFT LOWER LIMB LIMITED  Evaluation shows no evidence of thrombus in the common femoral vein  Doppler evaluation shows a normal response to augmentation maneuvers        The following portions of the patient's history were reviewed and updated as appropriate: allergies, current medications, past family history, past medical history, past social history, past surgical history and problem list     Review of Systems   Constitutional: Negative  HENT: Negative  Eyes: Negative  Respiratory: Negative  Cardiovascular: Positive for leg swelling  Gastrointestinal: Negative  Endocrine: Negative  Genitourinary: Negative  Musculoskeletal: Negative  Skin: Positive for color change  Allergic/Immunologic: Negative  Neurological: Negative  Hematological: Negative  Psychiatric/Behavioral: Negative  Objective:      /72 (BP Location: Right arm, Patient Position: Sitting, Cuff Size: Large)   Pulse 67   Ht 5' 5" (1 651 m)   Wt 98 7 kg (217 lb 9 6 oz)   BMI 36 21 kg/m²       1+ LE edema L >R       Physical Exam  Vitals and nursing note reviewed  Constitutional:       Appearance: She is well-developed  HENT:      Head: Normocephalic and atraumatic  Eyes:      Pupils: Pupils are equal, round, and reactive to light  Neck:      Thyroid: No thyromegaly  Vascular: No JVD  Trachea: Trachea normal    Cardiovascular:      Rate and Rhythm: Normal rate and regular rhythm  Pulses:           Carotid pulses are 2+ on the right side and 2+ on the left side  Radial pulses are 2+ on the right side and 2+ on the left side  Dorsalis pedis pulses are 2+ on the right side and 2+ on the left side        Heart sounds: Normal heart sounds, S1 normal and S2 normal  No murmur heard  No friction rub  No gallop  Pulmonary:      Effort: Pulmonary effort is normal  No accessory muscle usage or respiratory distress  Comments: Sl decreased BS, overall ctab  Abdominal:      General: Bowel sounds are normal  There is no distension  Palpations: Abdomen is soft  Tenderness: There is no abdominal tenderness  Musculoskeletal:         General: No deformity  Normal range of motion  Cervical back: Neck supple  Right lower leg: Edema present  Left lower leg: Edema present  Skin:     General: Skin is warm and dry  Findings: No lesion or rash  Nails: There is no clubbing  Neurological:      Mental Status: She is alert and oriented to person, place, and time  Comments: Grossly normal    Psychiatric:         Behavior: Behavior is cooperative  I have reviewed and made appropriate changes to the review of systems input by the medical assistant      Vitals:    22 1028   BP: 116/72   BP Location: Right arm   Patient Position: Sitting   Cuff Size: Large   Pulse: 67   Weight: 98 7 kg (217 lb 9 6 oz)   Height: 5' 5" (1 651 m)       Patient Active Problem List   Diagnosis   • Anxiety   • Keratosis pilaris   • Primary insomnia   • Varicose veins of leg with pain, right   • 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   • Pulmonary emboli (HCC)   • Migraines   • DVT, lower extremity, distal, acute, right [facility])       Past Surgical History:   Procedure Laterality Date   •  SECTION  2005    2nd son's birth   • TUBAL LIGATION      after second son was born in [birth year]       Family History   Problem Relation Age of Onset   • Hypertension Father    • Breast cancer Paternal Grandmother    • Diabetes Paternal Grandmother    • Substance Abuse Neg Hx    • Mental illness Neg Hx        Social History     Socioeconomic History   • Marital status: /Civil Union     Spouse name: Not on file   • Number of children: Not on file   • Years of education: Not on file   • Highest education level: Not on file   Occupational History   • Not on file   Tobacco Use   • Smoking status: Former     Packs/day: 0 25     Types: Cigarettes   • Smokeless tobacco: Never   • Tobacco comments:     havent smoked in a long time   Vaping Use   • Vaping Use: Every day   • Substances: Flavoring   Substance and Sexual Activity   • Alcohol use: Not Currently     Comment: socially   • Drug use: Never   • Sexual activity: Yes     Partners: Male     Birth control/protection: Surgical     Comment: Tubal ligation   Other Topics Concern   • Not on file   Social History Narrative   • Not on file     Social Determinants of Health     Financial Resource Strain: Not on file   Food Insecurity: Not on file   Transportation Needs: Not on file   Physical Activity: Not on file   Stress: Not on file   Social Connections: Not on file   Intimate Partner Violence: Not on file   Housing Stability: Not on file       Allergies   Allergen Reactions   • Iodine I 131 Tositumomab Rash     OK for CT per Dr Brittanie Felipe   • Doxycycline GI Intolerance and Vomiting   • Latex    • Other Hives         Current Outpatient Medications:   •  ALPRAZolam (XANAX) 0 5 mg tablet, Take 1 tablet (0 5 mg total) by mouth 4 (four) times a day as needed for anxiety (Patient not taking: Reported on 12/28/2022), Disp: 120 tablet, Rfl: 5

## 2022-12-28 NOTE — Clinical Note
Tommy Ray was seen and treated in our emergency department on 12/28/2022  Diagnosis:     Toyin Oglesby  may return to work on return date  She may return on this date: 01/01/2023         If you have any questions or concerns, please don't hesitate to call        Yovani Rodrigez MD    ______________________________           _______________          _______________  Hospital Representative                              Date                                Time

## 2022-12-28 NOTE — LETTER
December 28, 2022     Patient: Rachel Joseph  YOB: 1970  Date of Visit: 12/28/2022      To Whom it May Concern:    Rachel Joseph is under my professional care  Jabari Lamas was seen in my office on 12/28/2022  Jabari Lamas may return to work if she feels improved and has no fever for 24 hours  If you have any questions or concerns, please don't hesitate to call           Sincerely,          Leonidas Welch PA-C        CC: No Recipients

## 2022-12-29 LAB
ATRIAL RATE: 95 BPM
P AXIS: 54 DEGREES
PR INTERVAL: 154 MS
PROT C AG ACT/NOR PPP IA: 47 % OF NORMAL (ref 60–150)
PROT S ACT/NOR PPP: 78 % (ref 68–108)
QRS AXIS: -3 DEGREES
QRSD INTERVAL: 76 MS
QT INTERVAL: 384 MS
QTC INTERVAL: 482 MS
T WAVE AXIS: 51 DEGREES
VENTRICULAR RATE: 95 BPM

## 2022-12-30 LAB
APTT SCREEN TO CONFIRM RATIO: 1.02 RATIO (ref 0–1.34)
B2 GLYCOPROT1 IGA SERPL IA-ACNC: 1.4
B2 GLYCOPROT1 IGG SERPL IA-ACNC: <0.8
B2 GLYCOPROT1 IGM SERPL IA-ACNC: <2.4
CARDIOLIPIN IGA SER IA-ACNC: 1.3
CARDIOLIPIN IGG SER IA-ACNC: 1.2
CARDIOLIPIN IGM SER IA-ACNC: 3.8
CONFIRM APTT/NORMAL: 33.2 SEC (ref 0–47.6)
LA PPP-IMP: NORMAL
PROT S ACT/NOR PPP: 94 % (ref 61–136)
PROT S PPP-ACNC: 84 % (ref 60–150)
SCREEN APTT: 36.5 SEC (ref 0–51.9)
SCREEN DRVVT: 34.9 SEC (ref 0–47)
THROMBIN TIME: 16.3 SEC (ref 0–23)

## 2022-12-30 NOTE — ED ATTENDING ATTESTATION
12/28/2022  Melissa Marinelli DO, saw and evaluated the patient  I have discussed the patient with the resident/non-physician practitioner and agree with the resident's/non-physician practitioner's findings, Plan of Care, and MDM as documented in the resident's/non-physician practitioner's note, except where noted  All available labs and Radiology studies were reviewed  I was present for key portions of any procedure(s) performed by the resident/non-physician practitioner and I was immediately available to provide assistance  At this point I agree with the current assessment done in the Emergency Department    I have conducted an independent evaluation of this patient a history and physical is as follows:    ED Course         Critical Care Time  Procedures

## 2023-01-03 LAB — F5 GENE MUT ANL BLD/T: NORMAL

## 2023-01-04 ENCOUNTER — TELEPHONE (OUTPATIENT)
Dept: VASCULAR SURGERY | Facility: CLINIC | Age: 53
End: 2023-01-04

## 2023-01-04 NOTE — TELEPHONE ENCOUNTER
Pt w/ hx of RLE DVT/PE 9/2022 seen by Lucia on 12/28  Per note, pt continues to have BLE pain and edema  She was sent to the ED due to SOB and recently had been off of anticoagulation for thrombosis panel  She was diagnosed w/ flu at that time  Pt called the office today asking if there is anything else she should be doing at this time  She is wearing compression stockings daily and orthotic sneakers  She states that she still has "severe" pain in BLE and feet, which she states may be getting a little worse  The edema is the same  She is currently taking xarelto 20 mg daily  She is going to reach out to hematology regarding recent testing because she states she has not heard anything in regards to this  Lucia did order a reflux study that is scheduled for 1/17 and has a return OV scheduled 2/8   Informed her I would send a message to our triage provider to see if there are any other recommendations

## 2023-01-04 NOTE — TELEPHONE ENCOUNTER
Chart reviewed  Patient with hx of soleal DVT and extensive PE  Currently on Xarelto  Patient with persistent bilateral lower extremity edema and significant pain  Edema has remained the same, without worsening  Patient has a reflux study ordered  Recommend continuing Xarelto  Continue elevation, compression  Can use warm compresses to see if this helps   If patient's symptoms are worsening or remain severe, she can be seen again in the office for further discussion

## 2023-01-05 ENCOUNTER — TELEPHONE (OUTPATIENT)
Dept: HEMATOLOGY ONCOLOGY | Facility: CLINIC | Age: 53
End: 2023-01-05

## 2023-01-05 DIAGNOSIS — I26.99 OTHER ACUTE PULMONARY EMBOLISM WITHOUT ACUTE COR PULMONALE (HCC): Primary | ICD-10-CM

## 2023-01-05 DIAGNOSIS — I82.461 ACUTE DEEP VEIN THROMBOSIS (DVT) OF CALF MUSCLE VEIN OF RIGHT LOWER EXTREMITY (HCC): ICD-10-CM

## 2023-01-05 LAB — F2 GENE MUT ANL BLD/T: NORMAL

## 2023-01-05 NOTE — TELEPHONE ENCOUNTER
Patient returned call  I explained patient is to continue on 20 mg Xarelto until march d/t PE  She is aware once she is off the Xarelto for one week, she should complete blood work  She will call our office once she has the blood work done to make a follow up 2 weeks after

## 2023-01-05 NOTE — TELEPHONE ENCOUNTER
Called and spoke with patient  Discussed blood work results and Chris Lorenzo' recommendations  Patient states vascular would like her to continue xarelto for one month and complete b/l dopplers on 1/17 d/t continued pain and swelling in lower extremities  She is concerned about staying on Xarelto for 6 months  She would like to wait for results to come back from dopplers to determine next steps

## 2023-01-05 NOTE — TELEPHONE ENCOUNTER
Received voicemail from patient:    "Hello there  My name is Felipe Espino  's date of birth  [birth year] [age]  I am a patient of Pam Neal  I saw her over a week ago regarding some things I had going on with the DVT, working with the vein team over there with Edmar Merritt, so forth and so on  I have a bunch of tests that came through that Verline Necessary has ordered  We were supposed to go over them  I know she was on holiday  I don't know if she's back or not yet, but could someone please get back with me and let me know what's going on? I have some tests that are coming through that I'm obviously concerned about  I don't know if there is cause for concern or not  And we're going to go over that  And if I could kindly get a call back, that would be great  The number here is two, one, five, five, three, four, one, seven, two, three again, Felipe Espino  [age]  Patient of Pam Neal  Thank you so much   danuta Dyson "

## 2023-01-10 ENCOUNTER — TELEPHONE (OUTPATIENT)
Dept: HEMATOLOGY ONCOLOGY | Facility: CLINIC | Age: 53
End: 2023-01-10

## 2023-01-10 DIAGNOSIS — R52 PAIN: Primary | ICD-10-CM

## 2023-01-10 NOTE — TELEPHONE ENCOUNTER
Received voicemail from patient:     "Yeah  Hi this is Pradip Gordillo birth date [de-identified] one [de-identified] two  I was dealing with Leander Renteria and speaking with her a couple of times on one of [facility] patients and I'm on the oh gosh I was going to say Eliquis  It says they're [place] now and because I'm on the blood thinners obviously I can't have anything good like Advil so I have to take Tylenol which is there but I'm having really bad back pain and also cramping from my period so I'm out of my prescription Tylenol  So if there's a way they can call in a prescription like I guess what happened is I had five refills on my chart but at that time I thought I was getting off the blood thinners so when I put in my chart I wasn't taking it anymore remove the prescription from my chart I guess if there's a way we can get that called in  I use the pharmacy of the [facility] in [facility]  It's in my chart again my birth date is [birth year] [de-identified] two and I just need a prescription please for the Tylenol extra strength with a boatload of refills because if I'm going to have to be on these blood thinners I'm obviously not going to be able to take Advil or anything that really helps the pain but with that being said unfortunately the Tylenol is very very cheap when you get it through the pharmacy versus when you buy it off the shelf you know over the counter so if you could kindly do that favor for me I would really appreciate it any questions or problems give me a holler back My number here is [phone] again, patient name Pradip Gordillo  Birth date [birth year]  Looking for extra strength Tylenol prescription with refills  Thank you so much  Kiel tipton"    Called and spoke with patient  She is aware [facility] does not prescribe this and she should reach out to her PCP who has prescribed this for her in the past  Patient verbalized understanding

## 2023-01-17 ENCOUNTER — HOSPITAL ENCOUNTER (OUTPATIENT)
Dept: NON INVASIVE DIAGNOSTICS | Age: 53
Discharge: HOME | End: 2023-01-17

## 2023-01-17 DIAGNOSIS — I82.4Z1 DVT, LOWER EXTREMITY, DISTAL, ACUTE, RIGHT (HCC): ICD-10-CM

## 2023-01-17 DIAGNOSIS — I26.09 OTHER ACUTE PULMONARY EMBOLISM WITH ACUTE COR PULMONALE (HCC): ICD-10-CM

## 2023-01-17 DIAGNOSIS — I80.01 THROMBOPHLEBITIS OF SUPERFICIAL VEINS OF RIGHT LOWER EXTREMITY: ICD-10-CM

## 2023-01-17 DIAGNOSIS — I83.811 VARICOSE VEINS OF LEG WITH PAIN, RIGHT: ICD-10-CM

## 2023-01-18 ENCOUNTER — OFFICE VISIT (OUTPATIENT)
Dept: FAMILY MEDICINE CLINIC | Facility: CLINIC | Age: 53
End: 2023-01-18

## 2023-01-18 ENCOUNTER — TELEPHONE (OUTPATIENT)
Dept: HEMATOLOGY ONCOLOGY | Facility: CLINIC | Age: 53
End: 2023-01-18

## 2023-01-18 VITALS
SYSTOLIC BLOOD PRESSURE: 130 MMHG | HEIGHT: 66 IN | OXYGEN SATURATION: 97 % | RESPIRATION RATE: 18 BRPM | BODY MASS INDEX: 35.03 KG/M2 | WEIGHT: 218 LBS | HEART RATE: 84 BPM | TEMPERATURE: 98.8 F | DIASTOLIC BLOOD PRESSURE: 60 MMHG

## 2023-01-18 DIAGNOSIS — B37.9 ANTIBIOTIC-INDUCED YEAST INFECTION: ICD-10-CM

## 2023-01-18 DIAGNOSIS — R09.81 NASAL CONGESTION: ICD-10-CM

## 2023-01-18 DIAGNOSIS — T36.95XA ANTIBIOTIC-INDUCED YEAST INFECTION: ICD-10-CM

## 2023-01-18 DIAGNOSIS — J01.00 ACUTE NON-RECURRENT MAXILLARY SINUSITIS: Primary | ICD-10-CM

## 2023-01-18 RX ORDER — AZITHROMYCIN 250 MG/1
TABLET, FILM COATED ORAL
Qty: 6 TABLET | Refills: 0 | Status: SHIPPED | OUTPATIENT
Start: 2023-01-18 | End: 2023-01-23

## 2023-01-18 RX ORDER — FLUCONAZOLE 150 MG/1
TABLET ORAL
Qty: 2 TABLET | Refills: 0 | Status: SHIPPED | OUTPATIENT
Start: 2023-01-18 | End: 2023-01-25

## 2023-01-18 RX ORDER — ALBUTEROL SULFATE 90 UG/1
2 AEROSOL, METERED RESPIRATORY (INHALATION) EVERY 6 HOURS PRN
Qty: 6.7 G | Refills: 5 | Status: SHIPPED | OUTPATIENT
Start: 2023-01-18

## 2023-01-18 RX ORDER — BENZONATATE 200 MG/1
200 CAPSULE ORAL 3 TIMES DAILY PRN
Qty: 20 CAPSULE | Refills: 0 | Status: SHIPPED | OUTPATIENT
Start: 2023-01-18

## 2023-01-18 RX ORDER — ACETAMINOPHEN 500 MG
1000 TABLET ORAL EVERY 6 HOURS PRN
COMMUNITY
Start: 2023-01-10

## 2023-01-18 RX ORDER — FLUTICASONE PROPIONATE 50 MCG
1 SPRAY, SUSPENSION (ML) NASAL DAILY
Qty: 11.1 ML | Refills: 1 | Status: SHIPPED | OUTPATIENT
Start: 2023-01-18

## 2023-01-18 NOTE — PROGRESS NOTES
[address]  [facility]        NAME: Andrew Rico is a 50 y o  female  : 1970    MRN: [id number]  DATE: 2023  TIME: 11:31 AM    Assessment and Plan   Acute non-recurrent maxillary sinusitis [J01 00]  1  Acute non-recurrent maxillary sinusitis  azithromycin (Zithromax) 250 mg tablet    benzonatate (TESSALON) 200 MG capsule    albuterol (Proventil HFA) 90 mcg/act inhaler      2  Antibiotic-induced yeast infection  fluconazole (DIFLUCAN) 150 mg tablet      3  Nasal congestion  fluticasone (FLONASE) 50 mcg/act nasal spray            Patient Instructions     Patient Instructions   Discussed viral vs bacterial infection  RX as ordered  Continue OTC cough/cold/allergy medications as directed  Call or return for problems/concerns          Chief Complaint     Chief Complaint   Patient presents with   • Follow-up     2022 diagnosed with Influenza A-still having SOB, watery Eyes, cough,          History of Present Illness       Cough, congestion, sinus pressure, post nasal drip, sneezing, x 4 days  Hx of PE last month, was positive for flu also last month  Coughing since then but worse over the past 4 days  No fever  If antibiotic ordered she will need diflucan  Review of Systems   Review of Systems   Constitutional: Negative for activity change, chills, fatigue and fever  HENT: Positive for congestion, postnasal drip, rhinorrhea, sinus pressure and sore throat  Negative for ear pain  Eyes: Negative for pain, discharge and redness  Respiratory: Positive for cough, chest tightness and shortness of breath  Negative for wheezing  Cardiovascular: Negative for chest pain  Gastrointestinal: Negative for constipation, diarrhea, nausea and vomiting  Musculoskeletal: Negative for myalgias  Skin: Negative for rash  Neurological: Negative for dizziness and headaches           Current Medications       Current Outpatient Medications:   •  Acetaminophen 500 MG, Take 2 capsules (1,000 mg total) by mouth every 6 (six) hours as needed for mild pain, Disp: 100 capsule, Rfl: 10  •  albuterol (Proventil HFA) 90 mcg/act inhaler, Inhale 2 puffs every 6 (six) hours as needed for wheezing or shortness of breath, Disp: 6 7 g, Rfl: 5  •  ALPRAZolam (XANAX) 0 5 mg tablet, Take 1 tablet (0 5 mg total) by mouth 4 (four) times a day as needed for anxiety, Disp: 120 tablet, Rfl: 5  •  azithromycin (Zithromax) 250 mg tablet, Take 2 tablets (500 mg total) by mouth daily for 1 day, THEN 1 tablet (250 mg total) daily for 4 days  , Disp: 6 tablet, Rfl: 0  •  benzonatate (TESSALON) 200 MG capsule, Take 1 capsule (200 mg total) by mouth 3 (three) times a day as needed for cough, Disp: 20 capsule, Rfl: 0  •  fluconazole (DIFLUCAN) 150 mg tablet, Take one tablet today repeat in 1 week, Disp: 2 tablet, Rfl: 0  •  fluticasone (FLONASE) 50 mcg/act nasal spray, 1 spray into each nostril daily, Disp: 11 1 mL, Rfl: 1  •  RA Acetaminophen Ex St 500 MG tablet, Take 1,000 mg by mouth every 6 (six) hours as needed, Disp: , Rfl:   •  rivaroxaban (Xarelto) 20 mg tablet, Take 1 tablet (20 mg total) by mouth daily with breakfast, Disp: 30 tablet, Rfl: 2    Current Allergies     Allergies as of 01/18/2023 - Reviewed 01/18/2023   Allergen Reaction Noted   • Iodine i 131 tositumomab Rash 11/11/2021   • Doxycycline GI Intolerance and Vomiting 01/17/2017   • Latex  05/09/2016   • Other Hives 07/28/2021            The following portions of the patient's history were reviewed and updated as appropriate: allergies, current medications, past family history, past medical history, past social history, past surgical history and problem list      Past Medical History:   Diagnosis Date   • Allergic     as long as i can remember dust pollen etc   • Anemia     i can tend to be anemic i control through diet & supplementn   • Anxiety     as long as i can remember   • GERD (gastroesophageal reflux disease)     i have it on occasion   • Headache(784 0)     i get tension headaches alot       Past Surgical History:   Procedure Laterality Date   •  SECTION  2005    2nd son's birth   • TUBAL LIGATION      after second son was born in        Family History   Problem Relation Age of Onset   • Hypertension Father    • Breast cancer Paternal Grandmother    • Diabetes Paternal Grandmother    • Substance Abuse Neg Hx    • Mental illness Neg Hx          Medications have been verified  Objective   /60 (BP Location: Left arm, Patient Position: Sitting, Cuff Size: Adult)   Pulse 84   Temp 98 8 °F (37 1 °C) (Tympanic)   Resp 18   Ht 5' 6" (1 676 m)   Wt 98 9 kg (218 lb)   LMP 2022 (Exact Date)   SpO2 97%   BMI 35 19 kg/m²        Physical Exam     Physical Exam  Vitals and nursing note reviewed  Constitutional:       General: She is not in acute distress  Appearance: Normal appearance  She is well-developed  She is not ill-appearing or diaphoretic  HENT:      Head: Normocephalic and atraumatic  Right Ear: Tympanic membrane, ear canal and external ear normal  There is no impacted cerumen  Left Ear: Tympanic membrane, ear canal and external ear normal  There is no impacted cerumen  Nose: Congestion and rhinorrhea present  Right Sinus: Maxillary sinus tenderness present  Left Sinus: Maxillary sinus tenderness present  Mouth/Throat:      Pharynx: Oropharynx is clear  Uvula midline  No oropharyngeal exudate or posterior oropharyngeal erythema  Eyes:      General:         Right eye: No discharge  Left eye: No discharge  Extraocular Movements: Extraocular movements intact  Conjunctiva/sclera: Conjunctivae normal    Cardiovascular:      Rate and Rhythm: Normal rate and regular rhythm  Heart sounds: Normal heart sounds  No murmur heard  No friction rub  No gallop  Pulmonary:      Effort: Pulmonary effort is normal  No respiratory distress        Breath sounds: Rhonchi present  No wheezing or rales  Comments: Cough present  Chest:      Chest wall: No tenderness  Musculoskeletal:         General: Normal range of motion  Cervical back: Normal range of motion and neck supple  Skin:     General: Skin is warm and dry  Coloration: Skin is not pale  Findings: No erythema  Neurological:      Mental Status: She is alert and oriented to person, place, and time  Psychiatric:         Mood and Affect: Mood normal          Behavior: Behavior normal          Thought Content:  Thought content normal          Judgment: Judgment normal

## 2023-01-18 NOTE — TELEPHONE ENCOUNTER
Called and spoke with patient   She is aware ok to use albuterol inhaler and will update Kejim Bowels regarding patient's recent test

## 2023-01-18 NOTE — TELEPHONE ENCOUNTER
Received voicemail from patient:    "Yeah  Simran Izaguirre  This is Edelmira Boast again, [id number]  I just saw my regular primary care physician and obviously as you could hear my voice, I'm fighting like a cold type allergy thing here  One of the things she wanted me to double check with, she did prescribe albuterol just to kind of open me up a little bit because my chest is kind of congested still  So she wanted me just to double verify that it's OK with you or Alisson Love rather that it's OK to take that  She said I'm pretty sure that it should be fine, but just double, double verify with your pulmonology team  I also had the deep vein study done yesterday  I have a follow up with Doctor Stefani White on the twenty fifth  So if you want to let Alisson Love know that those results are in and it does look like there's some stuff going on  Obviously I don't know how to read things, but I can see in the chart that there's some stuff going on  So we're going to obviously be discussing that with Doctor Stefani White on the twenty fifth  So I did want to let you know that I did go forward with that test because she wanted to know when I was going to do that  So that would explain probably why I'm still having a shortness of breath and obviously I'm still fighting a cold and everything else and still recovering from the flu back in December  So sorry this message is so long, but I just want to kind of give you a detailed history of what's been going on and just you can give me a quick call back  Let me know that the albuterol is good to go  That would be great  Thanks  My number is two, one, five, five, three, four, one, seven, two, three  Thanks so much  Kiel Dyson"

## 2023-01-19 ENCOUNTER — TELEPHONE (OUTPATIENT)
Dept: ADMINISTRATIVE | Facility: OTHER | Age: 53
End: 2023-01-19

## 2023-01-19 NOTE — TELEPHONE ENCOUNTER
----- Message from Alessandra Kellogg sent at 1/18/2023 11:11 AM EST -----  Regarding: Care Gap  01/18/23 11:11 AM    Hello, our patient Elis Vela has had Pap Smear (HPV) aka Cervical Cancer Screening completed/performed  Please assist in updating the patient chart by making an External outreach to [place] [address] facility [address] Ye Espitia located in [address] [place] [address]  The date of service is last year       Thank you,  Peri [facility]

## 2023-01-19 NOTE — TELEPHONE ENCOUNTER
Upon review of the In Basket request and the patient's chart, initial outreach has been made via fax to facility  Please see Contacts section for details       Thank you  Abhi Rangel MA

## 2023-01-19 NOTE — LETTER
Procedure Request Form: Cervical Cancer Screening      Date Requested: 23  Patient: Isaac Contreras  Patient : 1970   Referring Provider: Zain Johnson, MD        Date of Procedure ____last year__________________________       The above patient has informed us that they have completed their   most recent Cervical Cancer Screening at your facility  Please complete   this form and attach all corresponding procedure reports/results  Comments __________________________________________________________  ____________________________________________________________________  ____________________________________________________________________  ____________________________________________________________________    Facility Completing Procedure _________________________________________    Form Completed By (print name) _______________________________________      Signature __________________________________________________________      These reports are needed for  compliance  Please fax this completed form and a copy of the procedure report to our office located at [place] [address] as soon as possible to [phone] kiarra Gutierrez: Phone [phone]    We thank you for your assistance in treating our mutual patient

## 2023-01-20 NOTE — TELEPHONE ENCOUNTER
Upon review of the In Basket request we were able to locate, review, and update the patient chart as requested for Pap Smear (HPV) aka Cervical Cancer Screening  Any additional questions or concerns should be emailed to the Practice Liaisons via the appropriate education email address, please do not reply via In Basket      Thank you  Sky Lemus MA

## 2023-01-25 ENCOUNTER — TELEPHONE (OUTPATIENT)
Dept: VASCULAR SURGERY | Facility: CLINIC | Age: 53
End: 2023-01-25

## 2023-01-25 ENCOUNTER — TELEPHONE (OUTPATIENT)
Dept: HEMATOLOGY ONCOLOGY | Facility: CLINIC | Age: 53
End: 2023-01-25

## 2023-01-25 NOTE — TELEPHONE ENCOUNTER
Received voicemail from patient:    "Wade Sarmiento, this is Bacilio Lowry  Birthdate [birth year]  I'm leaving a message because I know the last time we spoke you said Nasir Ayala wanted to keep me on the Xarelto for the next six months  But it looks like the pharmacy only has like a prescription for either like a month or two months  They don't have a full six month prescription  So according to my chart the pharmacy I use is [facility]  Could you please make sure she has a script on file for me since she wants me to stay on it for the blood thinners for six months because of the pulmonary embolisms in the past  Please update my chart to reflect six months, you know, refills at least  You know depending on how long this goes  I was actually supposed to have a follow up visit with Doctor Doctor today [facility]  But obviously due to the weather it didn't happen  So it's rescheduled for February seventh, but the results are in for the venous reflux study  You can see them in my chart if she has anything to add to that  I told her that I would let her know when they were in and obviously they're in now  So any questions, problems or whatever please get back with me  The number is two  One, five, five, three  Four  One, seven two three thank you"    Called and spoke with patient  Discussed she needs 6 months total of anticoagulation  She started Eliquis in September 2022 and start Xarelto in December 2022  She is aware she can stop anticoagulation in March of 2023  She states she has samples and does not need another refill  She will stop anticoagulation at the end of March  She is aware she does not need to follow up with Nasir Ayala and will continue following up with vascular

## 2023-01-25 NOTE — TELEPHONE ENCOUNTER
Pt called to change appt due to weather  Offered pt multiple appointments for same wee, pt refused  Pt is scheduled for 2/7

## 2023-02-06 ENCOUNTER — OFFICE VISIT (OUTPATIENT)
Dept: FAMILY MEDICINE CLINIC | Facility: CLINIC | Age: 53
End: 2023-02-06

## 2023-02-06 VITALS
BODY MASS INDEX: 35.03 KG/M2 | HEIGHT: 66 IN | RESPIRATION RATE: 18 BRPM | HEART RATE: 73 BPM | WEIGHT: 218 LBS | OXYGEN SATURATION: 97 % | DIASTOLIC BLOOD PRESSURE: 57 MMHG | TEMPERATURE: 97.8 F | SYSTOLIC BLOOD PRESSURE: 112 MMHG

## 2023-02-06 DIAGNOSIS — B37.31 RECURRENT CANDIDIASIS OF VAGINA: ICD-10-CM

## 2023-02-06 DIAGNOSIS — K58.9 SPASM OF BOWEL: ICD-10-CM

## 2023-02-06 DIAGNOSIS — H92.03 OTALGIA OF BOTH EARS: Primary | ICD-10-CM

## 2023-02-06 RX ORDER — DICYCLOMINE HCL 20 MG
20 TABLET ORAL 3 TIMES DAILY PRN
Qty: 30 TABLET | Refills: 0 | Status: SHIPPED | OUTPATIENT
Start: 2023-02-06

## 2023-02-06 RX ORDER — AMOXICILLIN AND CLAVULANATE POTASSIUM 875; 125 MG/1; MG/1
1 TABLET, FILM COATED ORAL EVERY 12 HOURS SCHEDULED
Qty: 20 TABLET | Refills: 0 | Status: SHIPPED | OUTPATIENT
Start: 2023-02-06 | End: 2023-02-16

## 2023-02-06 RX ORDER — FLUCONAZOLE 150 MG/1
TABLET ORAL
Qty: 2 TABLET | Refills: 0 | Status: SHIPPED | OUTPATIENT
Start: 2023-02-06 | End: 2023-02-13

## 2023-02-06 NOTE — PROGRESS NOTES
[id number]         NAME: Sarbjit Mujica is a 50 y o  female  : 1970    MRN: [id number]  DATE: 2023  TIME: 6:20 PM    Assessment and Plan   Otalgia of both ears [H92 03]  1  Otalgia of both ears  amoxicillin-clavulanate (Augmentin) 875-125 mg per tablet      2  Spasm of bowel  dicyclomine (BENTYL) 20 mg tablet      3  Recurrent candidiasis of vagina  fluconazole (DIFLUCAN) 150 mg tablet          No problem-specific Assessment & Plan notes found for this encounter  Patient Instructions     Patient Instructions   Prescriptions as ordered for travel purposes  I would stick to a softer diet with more liquids as this may help left lower quadrant discomfort  If this pain persist, consider sigmoidoscopy or colonoscopy with GI  Chief Complaint     Chief Complaint   Patient presents with   • Earache   • Left Abdominal pain         History of Present Illness       Patient comes in today for evaluation of ear pain  She has been somewhat congested  She is concerned that she is flying to vacation to [facility] is worried about either ear infection or fluid in ears  Also having abdominal pain  Did have recent CT scan which showed diverticulosis in the transverse colon  She does have some left lower quadrant pain on occasion  Earache   There is pain in both ears  This is a new problem  The current episode started in the past 7 days  The problem occurs constantly  The problem has been gradually worsening  There has been no fever  The pain is at a severity of 4/10  Associated symptoms include abdominal pain, headaches and rhinorrhea  Pertinent negatives include no coughing, diarrhea, ear discharge, hearing loss, neck pain, rash, sore throat or vomiting  Review of Systems   Review of Systems   HENT: Positive for ear pain and rhinorrhea  Negative for ear discharge, hearing loss and sore throat  Respiratory: Negative for cough      Gastrointestinal: Positive for abdominal pain  Negative for anal bleeding, blood in stool, diarrhea, rectal pain and vomiting  Musculoskeletal: Negative for neck pain  Skin: Negative for rash  Neurological: Positive for headaches           Current Medications       Current Outpatient Medications:   •  Acetaminophen 500 MG, Take 2 capsules (1,000 mg total) by mouth every 6 (six) hours as needed for mild pain, Disp: 100 capsule, Rfl: 10  •  albuterol (Proventil HFA) 90 mcg/act inhaler, Inhale 2 puffs every 6 (six) hours as needed for wheezing or shortness of breath, Disp: 6 7 g, Rfl: 5  •  ALPRAZolam (XANAX) 0 5 mg tablet, Take 1 tablet (0 5 mg total) by mouth 4 (four) times a day as needed for anxiety, Disp: 120 tablet, Rfl: 5  •  amoxicillin-clavulanate (Augmentin) 875-125 mg per tablet, Take 1 tablet by mouth every 12 (twelve) hours for 10 days, Disp: 20 tablet, Rfl: 0  •  benzonatate (TESSALON) 200 MG capsule, Take 1 capsule (200 mg total) by mouth 3 (three) times a day as needed for cough, Disp: 20 capsule, Rfl: 0  •  dicyclomine (BENTYL) 20 mg tablet, Take 1 tablet (20 mg total) by mouth 3 (three) times a day as needed (bowel cramps), Disp: 30 tablet, Rfl: 0  •  fluconazole (DIFLUCAN) 150 mg tablet, 1 tab daily at onset of yeast infection symptoms, repeat 1 week , Disp: 2 tablet, Rfl: 0  •  fluticasone (FLONASE) 50 mcg/act nasal spray, 1 spray into each nostril daily, Disp: 11 1 mL, Rfl: 1  •  RA Acetaminophen Ex St 500 MG tablet, Take 1,000 mg by mouth every 6 (six) hours as needed, Disp: , Rfl:   •  rivaroxaban (Xarelto) 20 mg tablet, Take 1 tablet (20 mg total) by mouth daily with breakfast, Disp: 30 tablet, Rfl: 2    Current Allergies     Allergies as of 02/06/2023 - Reviewed 02/06/2023   Allergen Reaction Noted   • Iodine i 131 tositumomab Rash 11/11/2021   • Doxycycline GI Intolerance and Vomiting 01/17/2017   • Latex  05/09/2016   • Other Hives 07/28/2021            The following portions of the patient's history were reviewed and updated as appropriate: allergies, current medications, past family history, past medical history, past social history, past surgical history and problem list      Past Medical History:   Diagnosis Date   • Allergic     as long as i can remember dust pollen etc   • Anemia     i can tend to be anemic i control through diet & supplementn   • Anxiety     as long as i can remember   • GERD (gastroesophageal reflux disease)     i have it on occasion   • Headache(784 0)     i get tension headaches alot       Past Surgical History:   Procedure Laterality Date   •  SECTION  2005    2nd son's birth   • TUBAL LIGATION      after second son was born in [birth year]       Family History   Problem Relation Age of Onset   • Hypertension Father    • Breast cancer Paternal Grandmother    • Diabetes Paternal Grandmother    • Substance Abuse Neg Hx    • Mental illness Neg Hx          Medications have been verified  Objective   /57 (BP Location: Left arm, Patient Position: Sitting, Cuff Size: Adult)   Pulse 73   Temp 97 8 °F (36 6 °C) (Tympanic)   Resp 18   Ht 5' 6" (1 676 m)   Wt 98 9 kg (218 lb)   SpO2 97%   BMI 35 19 kg/m²        Physical Exam     Physical Exam  Vitals reviewed  Constitutional:       General: She is not in acute distress  Appearance: She is well-developed  She is not diaphoretic  HENT:      Head: Normocephalic and atraumatic  Right Ear: Tympanic membrane, ear canal and external ear normal       Left Ear: Tympanic membrane, ear canal and external ear normal       Nose: Nose normal  No mucosal edema or rhinorrhea  Right Sinus: No maxillary sinus tenderness  Left Sinus: No maxillary sinus tenderness  Mouth/Throat:      Mouth: Mucous membranes are not pale and not dry  Dentition: Normal dentition  Pharynx: Uvula midline  No oropharyngeal exudate  Eyes:      General:         Right eye: No discharge  Left eye: No discharge  Extraocular Movements: Extraocular movements intact  Conjunctiva/sclera: Conjunctivae normal       Pupils: Pupils are equal, round, and reactive to light  Neck:      Thyroid: No thyromegaly  Cardiovascular:      Rate and Rhythm: Normal rate and regular rhythm  Heart sounds: Normal heart sounds  No murmur heard  Pulmonary:      Effort: Pulmonary effort is normal  No respiratory distress  Breath sounds: Normal breath sounds  No wheezing or rales  Abdominal:      General: There is no distension  Palpations: Abdomen is soft  There is no mass  Tenderness: There is abdominal tenderness  There is no guarding  Musculoskeletal:      Cervical back: Normal range of motion and neck supple  Right lower leg: No edema  Left lower leg: No edema  Lymphadenopathy:      Cervical: No cervical adenopathy  Neurological:      Mental Status: She is alert and oriented to person, place, and time  Cranial Nerves: No cranial nerve deficit     Psychiatric:         Mood and Affect: Mood normal          Behavior: Behavior normal

## 2023-02-06 NOTE — PATIENT INSTRUCTIONS
Prescriptions as ordered for travel purposes  I would stick to a softer diet with more liquids as this may help left lower quadrant discomfort    If this pain persist, consider sigmoidoscopy or colonoscopy with GI

## 2023-02-07 ENCOUNTER — OFFICE VISIT (OUTPATIENT)
Dept: VASCULAR SURGERY | Facility: CLINIC | Age: 53
End: 2023-02-07

## 2023-02-07 VITALS
RESPIRATION RATE: 18 BRPM | HEART RATE: 71 BPM | BODY MASS INDEX: 34.72 KG/M2 | SYSTOLIC BLOOD PRESSURE: 128 MMHG | WEIGHT: 216 LBS | DIASTOLIC BLOOD PRESSURE: 88 MMHG | HEIGHT: 66 IN

## 2023-02-07 DIAGNOSIS — I80.01 THROMBOPHLEBITIS OF SUPERFICIAL VEINS OF RIGHT LOWER EXTREMITY: ICD-10-CM

## 2023-02-07 DIAGNOSIS — I83.811 VARICOSE VEINS OF LEG WITH PAIN, RIGHT: ICD-10-CM

## 2023-02-07 DIAGNOSIS — I82.4Z1 DVT, LOWER EXTREMITY, DISTAL, ACUTE, RIGHT (HCC): Primary | ICD-10-CM

## 2023-02-07 NOTE — PATIENT INSTRUCTIONS
Continue wearing compression stockings and follow up with Vascular PRN Area H Indication Text: Tumors in this location are included in Area H (eyelids, eyebrows, nose, lips, chin, ear, pre-auricular, post-auricular, temple, genitalia, hands, feet, ankles and areola).  Tissue conservation is critical in these anatomic locations.

## 2023-02-07 NOTE — ASSESSMENT & PLAN NOTE
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

## 2023-02-07 NOTE — PROGRESS NOTES
Assessment/Plan:    Varicose veins of leg with pain, right  History of PE and right calf DVT    Has chronic bilateral leg pain  She does have varicose veins along both thigh and right calf  She has tried to wear compression stockings, but they are too painful to wear  She was treated with [facility], [place], operated by [facility] for her VTE      Here today to discuss LEVDR  She does have right accessory GSV refulx  Her leg pain symptoms do not seem to correspond to where her varicose veins are and do not seem related  I relayed the finding of her duplex and offered right accessory GSV closure and bilateral phlebectomies, but I believe there is a high likelihood her symptoms would not improve  She elected not to proceed with venous surgery  Continue compression stockings and follow up as needed  Diagnoses and all orders for this visit:    DVT, lower extremity, distal, acute, right ([address] )    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    Varicose veins of leg with pain, right          Subjective:      Patient ID: Andrew Rico is a 50 y o  female  Patient had a LEVDR on 1/17/23  Pt c/o BLE tiredness, heaviness, and itching  Pt wears compression 4-5 days a week and states they make her legs feel worse  Pt is on Xarelto  HPI    The following portions of the patient's history were reviewed and updated as appropriate: allergies, current medications, past family history, past medical history, past social history, past surgical history and problem list   History of calf DVT and PE  Has chronic bilateral leg pain and heaviness  Does not have pain specifically related to her VV  No prior venous surgery  Review of Systems   Constitutional: Negative  HENT: Negative  Eyes: Negative  Respiratory: Negative  Cardiovascular: Positive for leg swelling  Gastrointestinal: Negative  Endocrine: Negative  Genitourinary: Negative  Musculoskeletal: Negative for gait problem and neck stiffness          BLE pain Skin: Negative  Allergic/Immunologic: Negative  Neurological: Negative  Hematological: Negative  Psychiatric/Behavioral: Negative  I have reviewed the ROS above and made changes as needed        Objective:      /88 (BP Location: Left arm, Patient Position: Sitting)   Pulse 71   Resp 18   Ht 5' 6" (1 676 m)   Wt 98 kg (216 lb)   BMI 34 86 kg/m²          Physical Exam      General  Exam: alert, awake, oriented, no distress, consistent with stated age    Integumentary  Exam: warm, dry, no gross lesions, no bruises and normal color    Head and Neck  Exam: supple, no bruits, trachea midline, no JVD, no mass or palpable nodes    Eye  Exam: extraoccular movements intact, no scleral icterus, sclera clear, pupils equal round and reactive to light    ENMT  Exam: oral mucosa pink and moist    Chest and Lung  Exam: chest normal without deformity, bilaterally expansive, clear to auscultation    Cardiovascular  Exam: regular rate, regular rhythm, no murmurs, no rubs or gallops    Adbomen  Exam: soft, non-tender, non-distended, no pulsatile abdominal masses, no abdominal bruit    Peripheral Vascular  Exam: no clubbing of the digits of the upper extremity, no cyanosis, mild bilateral LE edema, both feet are warm, radial pulses 2+ bilaterally, skin well perfused, right lateral thigh, right lateral calf and left lateral thigh VV  No widened popliteal pulse noted bilaterally    Upper Extremity:  Palpation: Radial pulse- Bilateral 2+    Lower Extremity:  Palpation: Femoral pulse- Bilateral 2+         Popliteal pulse - Bilateral 2+        Dorsalis Pedis - Bilateral 2+        Posterior tibial - Bilateral 2+    Neurologic  Exam:alert, non-focal, oriented x 3, cranial nerves II-XII grossly intact

## 2023-02-08 ENCOUNTER — CONSULT (OUTPATIENT)
Dept: PULMONOLOGY | Facility: HOSPITAL | Age: 53
End: 2023-02-08

## 2023-02-08 VITALS
HEART RATE: 103 BPM | SYSTOLIC BLOOD PRESSURE: 108 MMHG | BODY MASS INDEX: 34.72 KG/M2 | TEMPERATURE: 98.8 F | WEIGHT: 216 LBS | DIASTOLIC BLOOD PRESSURE: 64 MMHG | OXYGEN SATURATION: 99 % | HEIGHT: 66 IN

## 2023-02-08 DIAGNOSIS — Z87.891 HISTORY OF TOBACCO ABUSE: ICD-10-CM

## 2023-02-08 DIAGNOSIS — Z86.711 HISTORY OF PULMONARY EMBOLISM: Primary | ICD-10-CM

## 2023-02-08 DIAGNOSIS — E66.9 CLASS 1 OBESITY WITHOUT SERIOUS COMORBIDITY WITH BODY MASS INDEX (BMI) OF 34.0 TO 34.9 IN ADULT, UNSPECIFIED OBESITY TYPE: ICD-10-CM

## 2023-02-08 DIAGNOSIS — R06.02 SHORTNESS OF BREATH ON EXERTION: ICD-10-CM

## 2023-02-08 NOTE — PROGRESS NOTES
Pulmonary Consultation   Brandon Sorensen 50 y o  female MRN: [id number]  2/8/2023      Assessment:    1  History of pulmonary embolism  -Considered unprovoked  She is up-to-date on cancer screening  She has no family history of malignancy  She had hypercoagulable work-up by hematology without any concerning findings  She has completed 3 months of Eliquis  She has been switched to Xarelto for an additional 3 months due to concern for weight gain or shortness of breath related to her Eliquis  Plan:  - I agree with extending her anticoagulation course for an additional 3 months  Recommend discontinuing after that  She did have a cardiac echo at the time of hospitalization which was negative for pulmonary hypertension  I doubt that her shortness of breath is linked to the prior pulmonary emboli  If her shortness of breath persists we could consider a VQ scan in the future  -     Ambulatory Referral to Pulmonology    2  Shortness of breath on exertion  -I suspect the shortness of breath is primarily related to weight gain  She reports gaining about 25 pounds in the past 3 months  Her CT of the chest does not show any evidence of emphysema or consolidations  There is no evidence of pulmonary emboli and his her last cardiac echo was completely normal   I recommended that she focus on achieving weight loss back to her weight prior to the pulmonary emboli which would be about 25 pounds  I would like to see her in the office in 4 months  If she continues to have shortness of breath and has reduced her weight significantly at that time then we will likely send for VQ scan and complete pulmonary function testing  3  History of tobacco abuse  -Smoked about 1 pack/day for 10 years and quit 20 years ago  Does not qualify for lung cancer screening CT of the chest   Last CT of the chest in December 2022 is normal    4   Class 1 obesity without serious comorbidity with body mass index (BMI) of 34 0 to 34 9 in adult, unspecified obesity type  -Counseled on weight loss  She is going to try and lose weight to the best of her ability  The goal will be about 5 pounds per month with a goal of 25 pounds at her next follow-up visit in 4 months  Plan:    Diagnoses and all orders for this visit:    History of pulmonary embolism  -     Ambulatory Referral to Pulmonology    Shortness of breath on exertion    History of tobacco abuse    Class 1 obesity without serious comorbidity with body mass index (BMI) of 34 0 to 34 9 in adult, unspecified obesity type        History of Present Illness   HPI:  Eileen Peters is a 50 y o  female who has a past medical history of endometriosis, diverticulosis without diverticulitis who presents for evaluation of shortness of breath  She is accompanied by her  today who helps provide further details  The shortness of breath is primarily with exertion  She denies any recent fevers, cough, sick contacts  The patient was in the hospital back in September 2022 for bilateral pulmonary emboli  She was treated with Eliquis for 3 months and recently followed up with a hematologist and her course has been extended for an additional 3 months and she has been switched to Xarelto for concern of shortness of breath or weight gain associated with the Eliquis  During her hospitalization she had a cardiac echo that did not reveal any evidence of pulmonary hypertension  She did have a repeat CT of the chest in December 2022 that showed the prior pulmonary emboli had resolved  She does have a 10-pack-year smoking history and quit about 20 years ago  She denies any drugs or alcohol abuse  She is employed for an ophthalmologist   Her family history is positive for her mother having COPD and her father having cardiac issues  She did have COVID in the past and recent influenza    She also reports about a 25 pound weight gain since September 2022      Review of Systems   Constitutional: Negative for chills and fever  HENT: Negative for congestion, postnasal drip and rhinorrhea  Eyes: Negative for itching  Respiratory: Positive for shortness of breath  Negative for cough, wheezing and stridor  Cardiovascular: Negative for chest pain, palpitations and leg swelling  Gastrointestinal: Negative for abdominal distention, abdominal pain, nausea and vomiting  Genitourinary: Negative for dysuria and flank pain  Musculoskeletal: Negative for arthralgias and myalgias  Skin: Negative for color change  Neurological: Negative for dizziness, light-headedness and headaches  Psychiatric/Behavioral: Negative  Historical Information   Past Medical History:   Diagnosis Date   • Allergic     as long as i can remember dust pollen etc   • Anemia     i can tend to be anemic i control through diet & supplementn   • Anxiety     as long as i can remember   • GERD (gastroesophageal reflux disease)     i have it on occasion   • Headache(784 0)     i get tension headaches alot     Past Surgical History:   Procedure Laterality Date   •  SECTION  2005    2nd son's birth   • TUBAL LIGATION      after second son was born in [birth year]     Family History   Problem Relation Age of Onset   • Hypertension Father    • Breast cancer Paternal Grandmother    • Diabetes Paternal Grandmother    • Substance Abuse Neg Hx    • Mental illness Neg Hx        Occupational History: Employed for an ophthalmologist    Social History: 10-pack-year smoking history and quit 20 years ago    Denies any drugs or alcohol abuse    Meds/Allergies     Current Outpatient Medications:   •  Acetaminophen 500 MG, Take 2 capsules (1,000 mg total) by mouth every 6 (six) hours as needed for mild pain, Disp: 100 capsule, Rfl: 10  •  albuterol (Proventil HFA) 90 mcg/act inhaler, Inhale 2 puffs every 6 (six) hours as needed for wheezing or shortness of breath, Disp: 6 7 g, Rfl: 5  •  ALPRAZolam (XANAX) 0 5 mg tablet, Take 1 tablet (0 5 mg total) by mouth 4 (four) times a day as needed for anxiety, Disp: 120 tablet, Rfl: 5  •  amoxicillin-clavulanate (Augmentin) 875-125 mg per tablet, Take 1 tablet by mouth every 12 (twelve) hours for 10 days, Disp: 20 tablet, Rfl: 0  •  benzonatate (TESSALON) 200 MG capsule, Take 1 capsule (200 mg total) by mouth 3 (three) times a day as needed for cough, Disp: 20 capsule, Rfl: 0  •  dicyclomine (BENTYL) 20 mg tablet, Take 1 tablet (20 mg total) by mouth 3 (three) times a day as needed (bowel cramps), Disp: 30 tablet, Rfl: 0  •  fluconazole (DIFLUCAN) 150 mg tablet, 1 tab daily at onset of yeast infection symptoms, repeat 1 week , Disp: 2 tablet, Rfl: 0  •  fluticasone (FLONASE) 50 mcg/act nasal spray, 1 spray into each nostril daily, Disp: 11 1 mL, Rfl: 1  •  RA Acetaminophen Ex St 500 MG tablet, Take 1,000 mg by mouth every 6 (six) hours as needed, Disp: , Rfl:   •  rivaroxaban (Xarelto) 20 mg tablet, Take 1 tablet (20 mg total) by mouth daily with breakfast, Disp: 30 tablet, Rfl: 2  Allergies   Allergen Reactions   • Iodine I 131 Tositumomab Rash     OK for CT per Dr Pedro Luis Son   • Doxycycline GI Intolerance and Vomiting   • Latex    • Other Hives       Vitals: Blood pressure 108/64, pulse 103, temperature 98 8 °F (37 1 °C), temperature source Tympanic, height 5' 6" (1 676 m), weight 98 kg (216 lb), SpO2 99 %  , Body mass index is 34 86 kg/m²  Oxygen Therapy  SpO2: 99 %  Oxygen Therapy: None (Room air)    Physical Exam  Physical Exam  Constitutional:       General: She is not in acute distress  Appearance: She is obese  She is not diaphoretic  HENT:      Head: Normocephalic and atraumatic  Nose: Nose normal       Mouth/Throat:      Pharynx: No oropharyngeal exudate  Eyes:      General: No scleral icterus  Conjunctiva/sclera: Conjunctivae normal       Pupils: Pupils are equal, round, and reactive to light  Neck:      Thyroid: No thyromegaly  Vascular: No JVD  Trachea: No tracheal deviation  Cardiovascular:      Rate and Rhythm: Normal rate and regular rhythm  Heart sounds: Normal heart sounds  No murmur heard  No friction rub  No gallop  Pulmonary:      Effort: Pulmonary effort is normal  No respiratory distress  Breath sounds: Normal breath sounds  No stridor  No wheezing or rales  Abdominal:      General: Bowel sounds are normal  There is no distension  Palpations: Abdomen is soft  Tenderness: There is no abdominal tenderness  There is no guarding or rebound  Musculoskeletal:         General: No deformity  Normal range of motion  Cervical back: Normal range of motion and neck supple  Lymphadenopathy:      Cervical: No cervical adenopathy  Skin:     General: Skin is warm  Findings: No erythema or rash  Neurological:      Mental Status: She is alert and oriented to person, place, and time  Cranial Nerves: No cranial nerve deficit  Sensory: No sensory deficit  Labs: I have personally reviewed pertinent lab results  Lab Results   Component Value Date    WBC 4 83 12/28/2022    HGB 10 7 (L) 12/28/2022    HCT 35 3 12/28/2022    MCV 81 (L) 12/28/2022     12/28/2022     Lab Results   Component Value Date    CALCIUM 8 5 12/28/2022    K 3 6 12/28/2022    CO2 25 12/28/2022     12/28/2022    BUN 10 12/28/2022    CREATININE 0 68 12/28/2022     No results found for: IGE  Lab Results   Component Value Date    ALT 8 12/28/2022    AST 12 (L) 12/28/2022    ALKPHOS 87 12/28/2022       Imaging and other studies: I have personally reviewed pertinent reports  and I have personally reviewed pertinent films in PACS  CTA chest PE study on September 19, 2022-bilateral pulmonary emboli  Repeat CT of the chest PE study from December 28, 2022- no evidence of pulmonary emboli  Normal-appearing lung parenchyma    No significant dilation of the pulmonary artery    Pulmonary function testing: None on file    EKG, Pathology, and Other Studies: I have personally reviewed pertinent reports     and I have personally reviewed pertinent films in PACS    Felisha Estes MD  Pulmonary and Critical Care   [facility]

## 2023-02-13 ENCOUNTER — TELEPHONE (OUTPATIENT)
Dept: VASCULAR SURGERY | Facility: CLINIC | Age: 53
End: 2023-02-13

## 2023-02-13 NOTE — TELEPHONE ENCOUNTER
Prior-auth was completed for xarelto and it was approved  Approvedtoday  CaseId:[id number];Status:Approved; Review Type:Prior Auth; Coverage Start Date:01/14/2023; Coverage End Date:02/13/2024;

## 2023-02-22 ENCOUNTER — OFFICE VISIT (OUTPATIENT)
Dept: FAMILY MEDICINE CLINIC | Facility: CLINIC | Age: 53
End: 2023-02-22

## 2023-02-22 VITALS
WEIGHT: 220 LBS | TEMPERATURE: 97.9 F | HEART RATE: 83 BPM | DIASTOLIC BLOOD PRESSURE: 57 MMHG | OXYGEN SATURATION: 98 % | BODY MASS INDEX: 35.51 KG/M2 | SYSTOLIC BLOOD PRESSURE: 123 MMHG

## 2023-02-22 DIAGNOSIS — N89.8 VAGINAL IRRITATION: Primary | ICD-10-CM

## 2023-02-22 DIAGNOSIS — R21 RASH AND NONSPECIFIC SKIN ERUPTION: ICD-10-CM

## 2023-02-22 NOTE — PROGRESS NOTES
[address]  [facility]        NAME: Salinas Pino is a 50 y o  female  : 1970    MRN: [id number]  DATE: 2023  TIME: 10:49 AM    Assessment and Plan   Vaginal irritation [N89 8]  1  Vaginal irritation        2  Rash and nonspecific skin eruption              Patient Instructions     Patient Instructions   UA is negative  Benadryl as directed for rash/itching  Keep skin moisturized  Apple cider vinegar bath/soak as directed-helps to restore natural ph balance  Call if vaginal symptoms return  Chief Complaint     Chief Complaint   Patient presents with   • Rash     Both legs    • vaginal smell         History of Present Illness       Patient was on vacation started with pelvic discomfort and a fishy odor-which have subsided  She also developed a rash on both legs, itchy, which has also improved  Female  Problem  The patient's primary symptoms include a genital odor and pelvic pain  The patient's pertinent negatives include no genital itching, genital lesions, genital rash, missed menses, vaginal bleeding or vaginal discharge  This is a new problem  The current episode started in the past 7 days  The problem occurs daily  The problem has been gradually worsening  The pain is mild  The problem affects both sides  She is not pregnant  Associated symptoms include abdominal pain, discolored urine and rash  Pertinent negatives include no anorexia, back pain, chills, constipation, diarrhea, dysuria, fever, flank pain, frequency, headaches, hematuria, joint pain, joint swelling, nausea, painful intercourse, sore throat, urgency or vomiting  Nothing aggravates the symptoms  She is sexually active  No, her partner does not have an STD  She uses tubal ligation for contraception  Her menstrual history has been irregular  Review of Systems   Review of Systems   Constitutional: Negative for chills and fever  HENT: Negative for sore throat      Gastrointestinal: Positive for abdominal pain  Negative for anorexia, constipation, diarrhea, nausea and vomiting  Genitourinary: Positive for pelvic pain  Negative for difficulty urinating, dysuria, flank pain, frequency, hematuria, missed menses, urgency, vaginal bleeding and vaginal discharge  Musculoskeletal: Negative for back pain and joint pain  Skin: Positive for rash  Neurological: Negative for headaches           Current Medications       Current Outpatient Medications:   •  Acetaminophen 500 MG, Take 2 capsules (1,000 mg total) by mouth every 6 (six) hours as needed for mild pain, Disp: 100 capsule, Rfl: 10  •  albuterol (Proventil HFA) 90 mcg/act inhaler, Inhale 2 puffs every 6 (six) hours as needed for wheezing or shortness of breath, Disp: 6 7 g, Rfl: 5  •  ALPRAZolam (XANAX) 0 5 mg tablet, Take 1 tablet (0 5 mg total) by mouth 4 (four) times a day as needed for anxiety, Disp: 120 tablet, Rfl: 5  •  benzonatate (TESSALON) 200 MG capsule, Take 1 capsule (200 mg total) by mouth 3 (three) times a day as needed for cough, Disp: 20 capsule, Rfl: 0  •  dicyclomine (BENTYL) 20 mg tablet, Take 1 tablet (20 mg total) by mouth 3 (three) times a day as needed (bowel cramps), Disp: 30 tablet, Rfl: 0  •  fluticasone (FLONASE) 50 mcg/act nasal spray, 1 spray into each nostril daily, Disp: 11 1 mL, Rfl: 1  •  RA Acetaminophen Ex St 500 MG tablet, Take 1,000 mg by mouth every 6 (six) hours as needed, Disp: , Rfl:   •  rivaroxaban (Xarelto) 20 mg tablet, Take 1 tablet (20 mg total) by mouth daily with breakfast, Disp: 30 tablet, Rfl: 2    Current Allergies     Allergies as of 02/22/2023 - Reviewed 02/22/2023   Allergen Reaction Noted   • Iodine i 131 tositumomab Rash 11/11/2021   • Doxycycline GI Intolerance and Vomiting 01/17/2017   • Latex  05/09/2016   • Other Hives 07/28/2021            The following portions of the patient's history were reviewed and updated as appropriate: allergies, current medications, past family history, past medical history, past social history, past surgical history and problem list      Past Medical History:   Diagnosis Date   • Allergic     as long as i can remember dust pollen etc   • Anemia     i can tend to be anemic i control through diet & supplementn   • Anxiety     as long as i can remember   • GERD (gastroesophageal reflux disease)     i have it on occasion   • Headache(784 0)     i get tension headaches alot       Past Surgical History:   Procedure Laterality Date   •  SECTION  2005    2nd son's birth   • TUBAL LIGATION      after second son was born in [birth year]       Family History   Problem Relation Age of Onset   • Hypertension Father    • Breast cancer Paternal Grandmother    • Diabetes Paternal Grandmother    • Substance Abuse Neg Hx    • Mental illness Neg Hx          Medications have been verified  Objective   /57 (BP Location: Left arm, Patient Position: Sitting, Cuff Size: Standard)   Pulse 83   Temp 97 9 °F (36 6 °C) (Tympanic)   Wt 99 8 kg (220 lb)   SpO2 98%   BMI 35 51 kg/m²        Physical Exam     Physical Exam  Vitals reviewed  Constitutional:       General: She is not in acute distress  Appearance: Normal appearance  She is not ill-appearing  HENT:      Head: Normocephalic  Cardiovascular:      Rate and Rhythm: Normal rate and regular rhythm  Heart sounds: Normal heart sounds  No murmur heard  No gallop  Pulmonary:      Effort: Pulmonary effort is normal  No respiratory distress  Breath sounds: Normal breath sounds  No wheezing or rhonchi  Abdominal:      General: There is no distension  Palpations: Abdomen is soft  Tenderness: There is no abdominal tenderness  There is no right CVA tenderness or left CVA tenderness  Skin:     General: Skin is warm and dry  Findings: Rash present  Comments: Mild rash b/l legs, skin is very dry   Neurological:      Mental Status: She is alert and oriented to person, place, and time  Psychiatric:         Mood and Affect: Mood normal          Behavior: Behavior normal          Thought Content:  Thought content normal          Judgment: Judgment normal

## 2023-02-22 NOTE — PATIENT INSTRUCTIONS
UA is negative  Benadryl as directed for rash/itching  Keep skin moisturized  Apple cider vinegar bath/soak as directed-helps to restore natural ph balance  Call if vaginal symptoms return

## 2023-04-24 ENCOUNTER — OFFICE VISIT (OUTPATIENT)
Dept: FAMILY MEDICINE CLINIC | Facility: CLINIC | Age: 53
End: 2023-04-24

## 2023-04-24 VITALS
HEART RATE: 86 BPM | TEMPERATURE: 97.2 F | OXYGEN SATURATION: 98 % | SYSTOLIC BLOOD PRESSURE: 116 MMHG | DIASTOLIC BLOOD PRESSURE: 82 MMHG | BODY MASS INDEX: 34.86 KG/M2 | WEIGHT: 216 LBS

## 2023-04-24 DIAGNOSIS — K29.50 OTHER CHRONIC GASTRITIS WITHOUT HEMORRHAGE: Primary | ICD-10-CM

## 2023-04-24 DIAGNOSIS — K21.9 GASTROESOPHAGEAL REFLUX DISEASE, UNSPECIFIED WHETHER ESOPHAGITIS PRESENT: ICD-10-CM

## 2023-04-24 DIAGNOSIS — F41.9 ANXIETY: ICD-10-CM

## 2023-04-24 DIAGNOSIS — K58.9 SPASM OF BOWEL: ICD-10-CM

## 2023-04-24 RX ORDER — DICYCLOMINE HCL 20 MG
20 TABLET ORAL 3 TIMES DAILY PRN
Qty: 60 TABLET | Refills: 0 | Status: SHIPPED | OUTPATIENT
Start: 2023-04-24

## 2023-04-24 RX ORDER — ALPRAZOLAM 0.5 MG/1
0.5 TABLET ORAL 4 TIMES DAILY PRN
Qty: 120 TABLET | Refills: 5 | Status: SHIPPED | OUTPATIENT
Start: 2023-04-24

## 2023-04-24 RX ORDER — PANTOPRAZOLE SODIUM 40 MG/1
40 TABLET, DELAYED RELEASE ORAL
Qty: 30 TABLET | Refills: 2 | Status: SHIPPED | OUTPATIENT
Start: 2023-04-24 | End: 2024-04-18

## 2023-04-24 NOTE — PROGRESS NOTES
[facility] [place]        NAME: Rip Funk is a 50 y o  female  : 1970    MRN: [id number]  DATE: 2023  TIME: 2:34 PM    Assessment and Plan   Other chronic gastritis without hemorrhage [K29 50]  1  Other chronic gastritis without hemorrhage  pantoprazole (PROTONIX) 40 mg tablet      2  Gastroesophageal reflux disease, unspecified whether esophagitis present  pantoprazole (PROTONIX) 40 mg tablet      3  Spasm of bowel  dicyclomine (BENTYL) 20 mg tablet      4  Anxiety  ALPRAZolam (XANAX) 0 5 mg tablet          No problem-specific Assessment & Plan notes found for this encounter  Patient Instructions     Patient Instructions   Trial of Protonix 40 mg once daily in the a m  Use antacids such as Tums or Rolaids after meals or other times during the day for the pain  If after 3 to 4 weeks things are stinking medically improved, get over-the-counter Pepcid 20 mg and take 1 or 2 tablets to replace the Protonix for another 2 weeks and then decrease or stop if symptoms remain controlled  If symptoms do not improve consider GI consult for EGD  Chief Complaint     Chief Complaint   Patient presents with   • Follow-up     Med check   • Abdominal Pain         History of Present Illness       Patient comes in today for evaluation of epigastric abdominal pain  known hiatal hernia from CT scan  Patient had a history of GERD had been on medication in the past has since stopped it  Abdominal Pain  This is a recurrent problem  The current episode started more than 1 month ago  The onset quality is undetermined  The problem occurs daily  The most recent episode lasted 0 Hours  The problem has been waxing and waning  The pain is located in the LLQ, LUQ, RLQ, RUQ, epigastric region, generalized abdominal region, periumbilical region, suprapubic region, left flank and right flank  The pain is at a severity of 6/10  The quality of the pain is cramping   The abdominal pain radiates to the periumbilical region and pelvis  Associated symptoms include arthralgias, belching, frequency, headaches, myalgias and nausea  Pertinent negatives include no anorexia, constipation, diarrhea, dysuria, fever, flatus, hematochezia, hematuria, melena, vomiting or weight loss  The pain is aggravated by nothing, being still, eating and movement  The pain is relieved by passing flatus and recumbency  Review of Systems   Review of Systems   Constitutional: Negative for fever and weight loss  Gastrointestinal: Positive for abdominal pain and nausea  Negative for anorexia, constipation, diarrhea, flatus, hematochezia, melena and vomiting  Genitourinary: Positive for frequency  Negative for dysuria and hematuria  Musculoskeletal: Positive for arthralgias and myalgias  Neurological: Positive for headaches           Current Medications       Current Outpatient Medications:   •  Acetaminophen 500 MG, Take 2 capsules (1,000 mg total) by mouth every 6 (six) hours as needed for mild pain, Disp: 100 capsule, Rfl: 10  •  albuterol (Proventil HFA) 90 mcg/act inhaler, Inhale 2 puffs every 6 (six) hours as needed for wheezing or shortness of breath, Disp: 6 7 g, Rfl: 5  •  ALPRAZolam (XANAX) 0 5 mg tablet, Take 1 tablet (0 5 mg total) by mouth 4 (four) times a day as needed for anxiety, Disp: 120 tablet, Rfl: 5  •  benzonatate (TESSALON) 200 MG capsule, Take 1 capsule (200 mg total) by mouth 3 (three) times a day as needed for cough, Disp: 20 capsule, Rfl: 0  •  dicyclomine (BENTYL) 20 mg tablet, Take 1 tablet (20 mg total) by mouth 3 (three) times a day as needed (bowel cramps), Disp: 60 tablet, Rfl: 0  •  fluticasone (FLONASE) 50 mcg/act nasal spray, 1 spray into each nostril daily, Disp: 11 1 mL, Rfl: 1  •  pantoprazole (PROTONIX) 40 mg tablet, Take 1 tablet (40 mg total) by mouth daily before breakfast, Disp: 30 tablet, Rfl: 2  •  RA Acetaminophen Ex St 500 MG tablet, Take 1,000 mg by mouth every 6 (six) hours as needed, Disp: , Rfl:   •  rivaroxaban (Xarelto) 20 mg tablet, Take 1 tablet (20 mg total) by mouth daily with breakfast, Disp: 30 tablet, Rfl: 2    Current Allergies     Allergies as of 2023 - Reviewed 2023   Allergen Reaction Noted   • Iodine i 131 tositumomab Rash 2021   • Doxycycline GI Intolerance and Vomiting 2017   • Latex  2016   • Other Hives 2021            The following portions of the patient's history were reviewed and updated as appropriate: allergies, current medications, past family history, past medical history, past social history, past surgical history and problem list      Past Medical History:   Diagnosis Date   • Allergic     as long as i can remember dust pollen etc   • Anemia     i can tend to be anemic i control through diet & supplementn   • Anxiety     as long as i can remember   • GERD (gastroesophageal reflux disease)     i have it on occasion   • Headache(784 0)     i get tension headaches alot       Past Surgical History:   Procedure Laterality Date   •  SECTION  2005    2nd son's birth   • TUBAL LIGATION      after second son was born in        Family History   Problem Relation Age of Onset   • Hypertension Father    • Breast cancer Paternal Grandmother    • Diabetes Paternal Grandmother    • Substance Abuse Neg Hx    • Mental illness Neg Hx          Medications have been verified  Objective   /82 (BP Location: Left arm, Patient Position: Sitting, Cuff Size: Standard)   Pulse 86   Temp (!) 97 2 °F (36 2 °C) (Tympanic)   Wt 98 kg (216 lb)   SpO2 98%   BMI 34 86 kg/m²        Physical Exam     Physical Exam  Constitutional:       General: She is not in acute distress  Appearance: She is well-developed  She is not diaphoretic  HENT:      Head: Normocephalic and atraumatic  Neck:      Thyroid: No thyromegaly  Cardiovascular:      Rate and Rhythm: Normal rate and regular rhythm        Heart sounds: Normal heart sounds  Pulmonary:      Effort: Pulmonary effort is normal  No respiratory distress  Breath sounds: Normal breath sounds  No wheezing  Abdominal:      General: Bowel sounds are normal  There is no distension  Palpations: Abdomen is soft  There is no mass  Tenderness: There is abdominal tenderness in the epigastric area  There is no guarding or rebound  Hernia: No hernia is present  Musculoskeletal:      Right shoulder: No tenderness  Cervical back: Neck supple  Lymphadenopathy:      Cervical: No cervical adenopathy  Skin:     General: Skin is warm and dry  Neurological:      Mental Status: She is alert and oriented to person, place, and time

## 2023-04-24 NOTE — PATIENT INSTRUCTIONS
Trial of Protonix 40 mg once daily in the a m  Use antacids such as Tums or Rolaids after meals or other times during the day for the pain  If after 3 to 4 weeks things are stinking medically improved, get over-the-counter Pepcid 20 mg and take 1 or 2 tablets to replace the Protonix for another 2 weeks and then decrease or stop if symptoms remain controlled  If symptoms do not improve consider GI consult for EGD

## 2023-06-05 ENCOUNTER — HOSPITAL ENCOUNTER (EMERGENCY)
Facility: HOSPITAL | Age: 53
Discharge: HOME | End: 2023-06-06
Attending: EMERGENCY MEDICINE
Payer: COMMERCIAL

## 2023-06-05 ENCOUNTER — NURSE TRIAGE (OUTPATIENT)
Dept: OTHER | Facility: OTHER | Age: 53
End: 2023-06-05

## 2023-06-05 DIAGNOSIS — M79.606 LEG PAIN: Primary | ICD-10-CM

## 2023-06-05 LAB
BASOPHILS # BLD AUTO: 0.08 THOUSANDS/ÂΜL (ref 0–0.1)
BASOPHILS NFR BLD AUTO: 1 % (ref 0–1)
EOSINOPHIL # BLD AUTO: 0.1 THOUSAND/ÂΜL (ref 0–0.61)
EOSINOPHIL NFR BLD AUTO: 1 % (ref 0–6)
ERYTHROCYTE [DISTWIDTH] IN BLOOD BY AUTOMATED COUNT: 16.3 % (ref 11.6–15.1)
HCT VFR BLD AUTO: 35.4 % (ref 34.8–46.1)
HGB BLD-MCNC: 10.7 G/DL (ref 11.5–15.4)
IMM GRANULOCYTES # BLD AUTO: 0.03 THOUSAND/UL (ref 0–0.2)
IMM GRANULOCYTES NFR BLD AUTO: 0 % (ref 0–2)
LYMPHOCYTES # BLD AUTO: 1.47 THOUSANDS/ÂΜL (ref 0.6–4.47)
LYMPHOCYTES NFR BLD AUTO: 18 % (ref 14–44)
MCH RBC QN AUTO: 24.7 PG (ref 26.8–34.3)
MCHC RBC AUTO-ENTMCNC: 30.2 G/DL (ref 31.4–37.4)
MCV RBC AUTO: 82 FL (ref 82–98)
MONOCYTES # BLD AUTO: 0.63 THOUSAND/ÂΜL (ref 0.17–1.22)
MONOCYTES NFR BLD AUTO: 8 % (ref 4–12)
NEUTROPHILS # BLD AUTO: 6.05 THOUSANDS/ÂΜL (ref 1.85–7.62)
NEUTS SEG NFR BLD AUTO: 72 % (ref 43–75)
NRBC BLD AUTO-RTO: 0 /100 WBCS
PLATELET # BLD AUTO: 328 THOUSANDS/UL (ref 149–390)
PMV BLD AUTO: 10.1 FL (ref 8.9–12.7)
RBC # BLD AUTO: 4.34 MILLION/UL (ref 3.81–5.12)
WBC # BLD AUTO: 8.36 THOUSAND/UL (ref 4.31–10.16)

## 2023-06-05 PROCEDURE — 80053 COMPREHEN METABOLIC PANEL: CPT | Performed by: EMERGENCY MEDICINE

## 2023-06-05 PROCEDURE — 96360 HYDRATION IV INFUSION INIT: CPT

## 2023-06-05 PROCEDURE — 93005 ELECTROCARDIOGRAM TRACING: CPT

## 2023-06-05 PROCEDURE — 36415 COLL VENOUS BLD VENIPUNCTURE: CPT | Performed by: EMERGENCY MEDICINE

## 2023-06-05 PROCEDURE — 99284 EMERGENCY DEPT VISIT MOD MDM: CPT

## 2023-06-05 PROCEDURE — 84484 ASSAY OF TROPONIN QUANT: CPT | Performed by: EMERGENCY MEDICINE

## 2023-06-05 PROCEDURE — 85025 COMPLETE CBC W/AUTO DIFF WBC: CPT | Performed by: EMERGENCY MEDICINE

## 2023-06-05 RX ADMIN — SODIUM CHLORIDE 1000 ML: 0.9 INJECTION, SOLUTION INTRAVENOUS at 23:45

## 2023-06-05 NOTE — Clinical Note
Cathi Bethea was seen and treated in our emergency department on 6/5/2023  Diagnosis:     Loulou Murrell  may return to work on return date  She may return on this date: 06/07/2023    Or sooner if feeling better     If you have any questions or concerns, please don't hesitate to call        Kamron Remy MD    ______________________________           _______________          _______________  Hospital Representative                              Date                                Time

## 2023-06-06 ENCOUNTER — HOSPITAL ENCOUNTER (OUTPATIENT)
Dept: NON INVASIVE DIAGNOSTICS | Facility: CLINIC | Age: 53
Discharge: HOME | End: 2023-06-06
Payer: COMMERCIAL

## 2023-06-06 ENCOUNTER — APPOINTMENT (EMERGENCY)
Dept: CT IMAGING | Facility: HOSPITAL | Age: 53
End: 2023-06-06
Payer: COMMERCIAL

## 2023-06-06 VITALS
TEMPERATURE: 98.2 F | HEART RATE: 77 BPM | RESPIRATION RATE: 16 BRPM | DIASTOLIC BLOOD PRESSURE: 56 MMHG | SYSTOLIC BLOOD PRESSURE: 121 MMHG | OXYGEN SATURATION: 100 %

## 2023-06-06 DIAGNOSIS — M79.606 LEG PAIN: ICD-10-CM

## 2023-06-06 LAB
ALBUMIN SERPL BCP-MCNC: 3.7 G/DL (ref 3.5–5)
ALP SERPL-CCNC: 74 U/L (ref 34–104)
ALT SERPL W P-5'-P-CCNC: 8 U/L (ref 7–52)
ANION GAP SERPL CALCULATED.3IONS-SCNC: 9 MMOL/L (ref 4–13)
AST SERPL W P-5'-P-CCNC: 10 U/L (ref 13–39)
BILIRUB SERPL-MCNC: 0.36 MG/DL (ref 0.2–1)
BUN SERPL-MCNC: 10 MG/DL (ref 5–25)
CALCIUM SERPL-MCNC: 8.5 MG/DL (ref 8.4–10.2)
CARDIAC TROPONIN I PNL SERPL HS: <2 NG/L
CHLORIDE SERPL-SCNC: 105 MMOL/L (ref 96–108)
CO2 SERPL-SCNC: 23 MMOL/L (ref 21–32)
CREAT SERPL-MCNC: 0.67 MG/DL (ref 0.6–1.3)
GFR SERPL CREATININE-BSD FRML MDRD: 102 ML/MIN/1.73SQ M
GLUCOSE SERPL-MCNC: 93 MG/DL (ref 65–140)
POTASSIUM SERPL-SCNC: 3.2 MMOL/L (ref 3.5–5.3)
PROT SERPL-MCNC: 6.5 G/DL (ref 6.4–8.4)
SODIUM SERPL-SCNC: 137 MMOL/L (ref 135–147)

## 2023-06-06 PROCEDURE — 71275 CT ANGIOGRAPHY CHEST: CPT

## 2023-06-06 PROCEDURE — G1004 CDSM NDSC: HCPCS

## 2023-06-06 PROCEDURE — 96361 HYDRATE IV INFUSION ADD-ON: CPT

## 2023-06-06 PROCEDURE — 93970 EXTREMITY STUDY: CPT | Performed by: SURGERY

## 2023-06-06 PROCEDURE — 93970 EXTREMITY STUDY: CPT

## 2023-06-06 RX ORDER — POTASSIUM CHLORIDE 20 MEQ/1
40 TABLET, EXTENDED RELEASE ORAL ONCE
Status: COMPLETED | OUTPATIENT
Start: 2023-06-06 | End: 2023-06-06

## 2023-06-06 RX ADMIN — POTASSIUM CHLORIDE 40 MEQ: 1500 TABLET, EXTENDED RELEASE ORAL at 01:59

## 2023-06-06 RX ADMIN — IOHEXOL 85 ML: 350 INJECTION, SOLUTION INTRAVENOUS at 00:58

## 2023-06-06 NOTE — TELEPHONE ENCOUNTER
Regarding: Pump in varicose vain  ----- Message from Tyson Roth sent at 6/5/2023  9:44 PM EDT -----  '' I have a pump in my varicose vain

## 2023-06-06 NOTE — ED ATTENDING ATTESTATION
6/5/2023  IElizabeth DO, saw and evaluated the patient  I have discussed the patient with the resident/non-physician practitioner and agree with the resident's/non-physician practitioner's findings, Plan of Care, and MDM as documented in the resident's/non-physician practitioner's note, except where noted  All available labs and Radiology studies were reviewed  I was present for key portions of any procedure(s) performed by the resident/non-physician practitioner and I was immediately available to provide assistance  At this point I agree with the current assessment done in the Emergency Department  I have conducted an independent evaluation of this patient a history and physical is as follows:    Patient is a 72-year-old female with a history of DVT/PE who presents with leg pain  Patient states that she developed right calf pain today and noticed that her veins were very prominent on the right lower extremity  This concerned her and reminded her of her previous DVT  He was on anticoagulation for 6 months and stopped about 2 months ago at the advice of her hematologist   She also complains of thoracic back pain, but states that she has had back pain since her previous PE  She does describe substernal chest pressure today, but thinks this may be related to anxiety  When she started having symptoms of DVT she called her physician who encouraged her to come to the ED  She states that her anxiety increased at that point  On exam, patient is in no acute distress  Appears mildly anxious  Heart is regular rate and rhythm  Breath sounds normal   Abdomen is soft, nontender, nondistended  No rebound or guarding  Prominent vasculature in the right lateral distal thigh and proximal lower leg  Tenderness to palpation in these areas as well  No pitting edema  Patient is high risk by Wells criteria for PE  Therefore CTA chest was performed  No evidence of pulmonary embolism    Unable to obtain "duplex of the lower extremity at this time  Tech is unavailable to perform the study  Discussed risks and benefits of prophylactic anticoagulation  Patient declines anticoagulation, but will obtain duplex in the morning  Recommended duplex to rule out DVT  Superficial thrombophlebitis also possible  Patient agrees to call tomorrow to arrange for the study and return to ED sooner if symptoms worsen  Patient is well-appearing and stable for discharge  Portions of the above record have been created with voice recognition software  Occasional wrong word or \"sound alike\" substitutions may have occurred due to the inherent limitations of voice recognition software  Read the chart carefully and recognize, using context, where substitutions may have occurred        ED Course         Critical Care Time  Procedures      "

## 2023-06-06 NOTE — TELEPHONE ENCOUNTER
"  Reason for Disposition  • History of prior \"blood clot\" in leg or lungs (i e , deep vein thrombosis, pulmonary embolism)    Answer Assessment - Initial Assessment Questions  1  ONSET: \"When did the pain start? \"       Patient stated that she has history of DVT/PE  Patient stated it is in the varicose vein itself; has a hard lump; about the size of a green olive; almost that size  Outside of right knee; right in bend of knee  2  LOCATION: \"Where is the pain located? \"       Patient does have chronic pain  3  PAIN: \"How bad is the pain? \"    (Scale 1-10; or mild, moderate, severe)    -  MILD (1-3): doesn't interfere with normal activities     -  MODERATE (4-7): interferes with normal activities (e g , work or school) or awakens from sleep, limping     -  SEVERE (8-10): excruciating pain, unable to do any normal activities, unable to walk       Able to walk  4/10  4  WORK OR EXERCISE: \"Has there been any recent work or exercise that involved this part of the body? \"       N/A  5  CAUSE: \"What do you think is causing the leg pain? \"      Unsure, has history of DVT  6  OTHER SYMPTOMS: \"Do you have any other symptoms? \" (e g , chest pain, back pain, breathing difficulty, swelling, rash, fever, numbness, weakness)      Breath is shallow; unsure if related to anxiety      Protocols used: LEG PAIN-ADULT-AH    "

## 2023-06-06 NOTE — TELEPHONE ENCOUNTER
Advised patient to go to the ED due to symptoms and history of previous DVT/PE  Patient agreeable; will be going to [facility] ED  Placed patient on ED tracking board

## 2023-06-06 NOTE — TELEPHONE ENCOUNTER
"Forwarded:  Va Demarco([facility])   Cristian Myers, I have a mutual patient that was seen in the ER last night Juluis Moritz [birth year] she has a follow up with Dr Eliud Zazueta July 10th but she was positive for right leg superficial thrombophlebitis in a proximal calf varicose vein she asked we contact you for instructions    3:04 PM  20 days left    Forwarded:  Va Demarco([facility])   She said the pain is mostly in that area (proximal lateral calf only) it’s just in one varicose vein but she said she feels like it is creeping up but there is no extension of svt just that one spot    3:04 PM  20 days left      Per Aurea Feeling, PA-C, \"let her know that everything is superficial  Be sure she has compression and she can raise leg, take it easy and use warm compresses  Let us know if anything worsens  She can follow up with Dr Eliud Zazueta as planned  \"    Pt notified and verbalized understanding      "

## 2023-06-06 NOTE — ED PROVIDER NOTES
History  Chief Complaint   Patient presents with   • Leg Pain     Pt reports right calf pain and lump on leg  Pt has hx of DVT/PE  Pt also reports mild SOB but states it may be related to anxiety  Pt stopped thinners 2 months ago     Sudhir Ibarra) Sudhir Ibarra is a 50 y o  female who identifies as female    They presented to the emergency department on June 5, 2023  Patient presents with:  Leg Pain: Pt reports right calf pain and lump on leg  Pt has hx of DVT/PE  Pt also reports mild SOB but states it may be related to anxiety  Pt stopped thinners 2 months ago  The patient states that earlier today around noon she started developing right-sided calf pain as well as itching similar nature to previous DVTs  Patient notes that shortly after she developed this pain she also developed pain in her left calf and began feeling chest tightness and mild back pain that worsened with inspiration similar to previous pulmonary embolism  Patient became concerned and called her family doctor who advised her to come to the emergency department for evaluation  Patient states she originally was placed on Eliquis, however transition to Xarelto and ceased use 2 months ago  Patient notes that she has follow-up for her pulmonary embolism next month  Patient denies any fever, chills, nausea, vomiting, abdominal pain, change in bowel habits, change in urination, rash, or any other complaints at this time      Allergies include:  -- Iodine I 131 Tositumomab -- Rash   --  OK for CT per Dr Sravanthi Miranda  -- Doxycycline -- GI Intolerance and Vomiting  -- Latex   -- Other -- Hives    Patient's home medications:  Prior to Admission medications :  Medication Acetaminophen 500 MG, Sig Take 2 capsules (1,000 mg total) by mouth every 6 (six) hours as needed for mild pain, Start Date 1/10/23, End Date , Taking? , Authorizing Provider Auburn Halsted, MD    Medication albuterol (Proventil HFA) 90 mcg/act inhaler, Sig Inhale 2 puffs every 6 (six) hours as needed for wheezing or shortness of breath, Start Date 1/18/23, End Date , Taking? , Authorizing Provider EMA Bedoya    Medication ALPRAZolam Leonardo Grumbling) 0 5 mg tablet, Sig Take 1 tablet (0 5 mg total) by mouth 4 (four) times a day as needed for anxiety, Start Date 4/24/23, End Date , Taking? , Authorizing Provider Jose Haynes MD    Medication benzonatate (TESSALON) 200 MG capsule, Sig Take 1 capsule (200 mg total) by mouth 3 (three) times a day as needed for cough, Start Date 1/18/23, End Date , Taking? , Authorizing Provider EMA Dasilva    Medication dicyclomine (BENTYL) 20 mg tablet, Sig Take 1 tablet (20 mg total) by mouth 3 (three) times a day as needed (bowel cramps), Start Date 4/24/23, End Date , Taking? , Authorizing Provider Jose Haynes MD    Medication fluticasone (FLONASE) 50 mcg/act nasal spray, Sig 1 spray into each nostril daily, Start Date 1/18/23, End Date , Taking? , Authorizing Provider EMA Dasilva    Medication pantoprazole (PROTONIX) 40 mg tablet, Sig Take 1 tablet (40 mg total) by mouth daily before breakfast, Start Date 4/24/23, End Date 4/18/24, Taking? , Authorizing Provider Jose Haynes MD    Medication RA Acetaminophen Ex St 500 MG tablet, Sig Take 1,000 mg by mouth every 6 (six) hours as needed, Start Date 1/10/23, End Date , Taking? , Authorizing Provider Lorena Williamson MD    Medication rivaroxaban (Xarelto) 20 mg tablet, Sig Take 1 tablet (20 mg total) by mouth daily with breakfast, Start Date 12/28/22, End Date , Taking? , Authorizing Provider Rolly Morales PA-C    Medication famotidine (PEPCID) 20 mg tablet, Sig Take 1 tablet (20 mg total) by mouth 2 (two) times a day, Start Date 8/3/22, End Date 9/20/22, Taking? , Authorizing Provider Lauren Contreras MD    Medication QUEtiapine (SEROquel) 25 mg tablet, Sig Take 1 tablet (25 mg total) by mouth daily at bedtime, Start Date 8/3/22, End Date 9/20/22, Taking? , Authorizing Provider Lauren Contreras, MD        Immunizations:    Immunization History  Administered            Date(s) Administered   Influenza Quadrivalent Preservative Free 3 years and older IM                         2017     Tdap                  2015    Immunizations Reviewed  Prior to Admission Medications   Prescriptions Last Dose Informant Patient Reported? Taking?    ALPRAZolam (XANAX) 0 5 mg tablet   No No   Sig: Take 1 tablet (0 5 mg total) by mouth 4 (four) times a day as needed for anxiety   Acetaminophen 500 MG   No No   Sig: Take 2 capsules (1,000 mg total) by mouth every 6 (six) hours as needed for mild pain   RA Acetaminophen Ex St 500 MG tablet   Yes No   Sig: Take 1,000 mg by mouth every 6 (six) hours as needed   albuterol (Proventil HFA) 90 mcg/act inhaler   No No   Sig: Inhale 2 puffs every 6 (six) hours as needed for wheezing or shortness of breath   benzonatate (TESSALON) 200 MG capsule   No No   Sig: Take 1 capsule (200 mg total) by mouth 3 (three) times a day as needed for cough   dicyclomine (BENTYL) 20 mg tablet   No No   Sig: Take 1 tablet (20 mg total) by mouth 3 (three) times a day as needed (bowel cramps)   fluticasone (FLONASE) 50 mcg/act nasal spray   No No   Si spray into each nostril daily   pantoprazole (PROTONIX) 40 mg tablet   No No   Sig: Take 1 tablet (40 mg total) by mouth daily before breakfast   rivaroxaban (Xarelto) 20 mg tablet   No No   Sig: Take 1 tablet (20 mg total) by mouth daily with breakfast      Facility-Administered Medications: None       Past Medical History:   Diagnosis Date   • Allergic     as long as i can remember dust pollen etc   • Anemia     i can tend to be anemic i control through diet & supplementn   • Anxiety     as long as i can remember   • GERD (gastroesophageal reflux disease)     i have it on occasion   • Headache(784 0)     i get tension headaches alot       Past Surgical History:   Procedure Laterality Date   •  SECTION  2005    2nd son's birth   • TUBAL LIGATION      after second son was born in 48       Family History   Problem Relation Age of Onset   • Hypertension Father    • Breast cancer Paternal Grandmother    • Diabetes Paternal Grandmother    • Substance Abuse Neg Hx    • Mental illness Neg Hx      I have reviewed and agree with the history as documented  E-Cigarette/Vaping   • E-Cigarette Use Former User      E-Cigarette/Vaping Substances   • THC No    • CBD No    • Flavoring Yes      Social History     Tobacco Use   • Smoking status: Former     Packs/day: 0 25     Types: Cigarettes, E-Cigarettes     Start date:      Quit date: 2022     Years since quittin 7   • Smokeless tobacco: Never   • Tobacco comments:     havent smoked in a long time   Vaping Use   • Vaping Use: Former   • Substances: Flavoring   Substance Use Topics   • Alcohol use: Not Currently     Comment: socially   • Drug use: Never        Review of Systems   Constitutional: Negative for chills and fever  HENT: Negative for ear pain and sore throat  Eyes: Negative for pain and visual disturbance  Respiratory: Positive for chest tightness and shortness of breath  Negative for cough  Cardiovascular: Positive for leg swelling (with pain)  Negative for chest pain and palpitations  Gastrointestinal: Negative for abdominal pain and vomiting  Genitourinary: Negative for dysuria and hematuria  Musculoskeletal: Negative for arthralgias and back pain  Skin: Negative for color change and rash  Neurological: Negative for seizures and syncope  All other systems reviewed and are negative        Physical Exam  ED Triage Vitals [23 2309]   Temperature Pulse Respirations Blood Pressure SpO2   98 2 °F (36 8 °C) 72 16 137/80 99 %      Temp Source Heart Rate Source Patient Position - Orthostatic VS BP Location FiO2 (%)   Oral Monitor Sitting Right arm --      Pain Score       --             Orthostatic Vital Signs  Vitals:    23 2309 23 0122 BP: 137/80 121/56   Pulse: 72 77   Patient Position - Orthostatic VS: Sitting Lying       Physical Exam  Vitals and nursing note reviewed  Constitutional:       General: She is in acute distress (mild)  Appearance: She is well-developed  HENT:      Head: Normocephalic and atraumatic  Right Ear: External ear normal       Left Ear: External ear normal       Nose: Nose normal       Mouth/Throat:      Mouth: Mucous membranes are moist    Eyes:      Conjunctiva/sclera: Conjunctivae normal    Cardiovascular:      Rate and Rhythm: Normal rate and regular rhythm  Heart sounds: No murmur heard  Pulmonary:      Effort: Pulmonary effort is normal  No respiratory distress  Breath sounds: Normal breath sounds  No stridor  No wheezing, rhonchi or rales  Chest:      Chest wall: No tenderness  Abdominal:      General: Abdomen is flat  Bowel sounds are normal       Palpations: Abdomen is soft  Tenderness: There is no abdominal tenderness  There is no guarding or rebound  Musculoskeletal:         General: Swelling (Superficial swelling overlying proximal lateral right calf with slight ecchymotic changes overlying prominent venous vasculature) and tenderness (Mild tenderness upon palpation of bilateral calves) present  Cervical back: Neck supple  Skin:     General: Skin is warm and dry  Capillary Refill: Capillary refill takes less than 2 seconds  Neurological:      Mental Status: She is alert  Mental status is at baseline     Psychiatric:         Mood and Affect: Mood normal          ED Medications  Medications   sodium chloride 0 9 % bolus 1,000 mL (0 mL Intravenous Stopped 6/6/23 0147)   iohexol (OMNIPAQUE) 350 MG/ML injection (SINGLE-DOSE) 85 mL (85 mL Intravenous Given 6/6/23 0058)   potassium chloride (K-DUR,KLOR-CON) CR tablet 40 mEq (40 mEq Oral Given 6/6/23 0159)       Diagnostic Studies  Results Reviewed     Procedure Component Value Units Date/Time    HS Troponin 0hr (reflex protocol) [[id number]]  (Normal) Collected: 06/05/23 2346    Lab Status: Final result Specimen: Blood from Arm, Right Updated: 06/06/23 0018     hs TnI 0hr <2 ng/L     Comprehensive metabolic panel [[id number]]  (Abnormal) Collected: 06/05/23 2346    Lab Status: Final result Specimen: Blood from Arm, Right Updated: 06/06/23 0014     Sodium 137 mmol/L      Potassium 3 2 mmol/L      Chloride 105 mmol/L      CO2 23 mmol/L      ANION GAP 9 mmol/L      BUN 10 mg/dL      Creatinine 0 67 mg/dL      Glucose 93 mg/dL      Calcium 8 5 mg/dL      AST 10 U/L      ALT 8 U/L      Alkaline Phosphatase 74 U/L      Total Protein 6 5 g/dL      Albumin 3 7 g/dL      Total Bilirubin 0 36 mg/dL      eGFR 102 ml/min/1 73sq m     Narrative:      [place] guidelines for Chronic Kidney Disease (CKD):   •  Stage 1 with normal or high GFR (GFR > 90 mL/min/1 73 square meters)  •  Stage 2 Mild CKD (GFR = 60-89 mL/min/1 73 square meters)  •  Stage 3A Moderate CKD (GFR = 45-59 mL/min/1 73 square meters)  •  Stage 3B Moderate CKD (GFR = 30-44 mL/min/1 73 square meters)  •  Stage 4 Severe CKD (GFR = 15-29 mL/min/1 73 square meters)  •  Stage 5 End Stage CKD (GFR <15 mL/min/1 73 square meters)  Note: GFR calculation is accurate only with a steady state creatinine    CBC and differential [[id number]]  (Abnormal) Collected: 06/05/23 2346    Lab Status: Final result Specimen: Blood from Arm, Right Updated: 06/05/23 2352     WBC 8 36 Thousand/uL      RBC 4 34 Million/uL      Hemoglobin 10 7 g/dL      Hematocrit 35 4 %      MCV 82 fL      MCH 24 7 pg      MCHC 30 2 g/dL      RDW 16 3 %      MPV 10 1 fL      Platelets 613 Thousands/uL      nRBC 0 /100 WBCs      Neutrophils Relative 72 %      Immat GRANS % 0 %      Lymphocytes Relative 18 %      Monocytes Relative 8 %      Eosinophils Relative 1 %      Basophils Relative 1 %      Neutrophils Absolute 6 05 Thousands/µL      Immature Grans Absolute 0 03 Thousand/uL Lymphocytes Absolute 1 47 Thousands/µL      Monocytes Absolute 0 63 Thousand/µL      Eosinophils Absolute 0 10 Thousand/µL      Basophils Absolute 0 08 Thousands/µL                  CTA ED chest PE Study   Final Result by Magalys Ford MD (06/06 0319)      No evidence for pulmonary embolus  No consolidation or effusion  Minimal perifissural groundglass opacity right lower lobe which may be infectious versus inflammatory in etiology  Correlate clinically              Workstation performed: [id number]         VAS lower limb venous duplex study, unilateral/limited    (Results Pending)         Procedures  ECG 12 Lead Documentation Only    Date/Time: 6/5/2023 11:54 PM    Performed by: Gloria Figueroa MD  Authorized by: Gloria Figueroa MD    Indications / Diagnosis:  Chest tightness  Patient location:  ED  Previous ECG:     Previous ECG:  Compared to current    Similarity:  No change  Interpretation:     Interpretation: abnormal    Quality:     Tracing quality:  Limited by artifact  Rate:     ECG rate:  62    ECG rate assessment: normal    Rhythm:     Rhythm: sinus rhythm    Ectopy:     Ectopy: none    QRS:     QRS axis:  Normal    QRS intervals:  Normal  Conduction:     Conduction: normal    ST segments:     ST segments:  Normal  T waves:     T waves: non-specific and flattening      Flattening:  III, V2 and V3  Comments:      Normal sinus rhythm at 62 bpm, no PACs, no PVCs, mild T wave flattening in lead III, V2, V3          ED Course                                 Wells' Criteria for PE    Flowsheet Row Most Recent Value   Wells' Criteria for PE    Clinical signs and symptoms of DVT 3 Filed at: 06/05/2023 2332   PE is primary diagnosis or equally likely 3 Filed at: 06/05/2023 2332   HR >100 0 Filed at: 06/05/2023 2332   Immobilization at least 3 days or Surgery in the previous 4 weeks 0 Filed at: 06/05/2023 2332   Previous, objectively diagnosed PE or DVT 1 5 Filed at: 06/05/2023 2332   Hemoptysis 0 Filed at: 06/05/2023 2332   Malignancy with treatment within 6 months or palliative 0 Filed at: 06/05/2023 2332   Wells' Criteria Total 7 5 Filed at: 06/05/2023 2332            Medical Decision Making  Patient presents with:  Leg Pain: Pt reports right calf pain and lump on leg  Pt has hx of DVT/PE  Pt also reports mild SOB but states it may be related to anxiety  Pt stopped thinners 2 months ago      Patient seen and examined noted to have Superficial swelling overlying proximal lateral right calf with slight ecchymotic changes overlying prominent venous vasculature, as well as mild ttp of bl calves  Temp:  (98 2 °F (36 8 °C)) 98 2 °F (36 8 °C)  HR:  (72-77) 77  Resp:  (16) 16  BP: (121-137)/(56-80) 121/56    Differential diagnosis includes but is not limited to PE, DVT, superficial thrombophlebitis        Due to patient's history and presentation the following laboratory evidence was collected:  Recent Results (from the past 12 hour(s))  -CBC and differential:   Collection Time: 06/05/23 11:46 PM       Result                      Value             Ref Range           WBC                         8 36              4 31 - 10 16*       RBC                         4 34              3 81 - 5 12 *       Hemoglobin                  10 7 (L)          11 5 - 15 4 *       Hematocrit                  35 4              34 8 - 46 1 %       MCV                         82                82 - 98 fL          MCH                         24 7 (L)          26 8 - 34 3 *       MCHC                        30 2 (L)          31 4 - 37 4 *       RDW                         16 3 (H)          11 6 - 15 1 %       MPV                         10 1              8 9 - 12 7 fL       Platelets                   328               149 - 390 Th*       nRBC                        0                 /100 WBCs           Neutrophils Relative        72                43 - 75 %           Immat GRANS %               0                 0 - 2 % Lymphocytes Relative        18                14 - 44 %           Monocytes Relative          8                 4 - 12 %            Eosinophils Relative        1                 0 - 6 %             Basophils Relative          1                 0 - 1 %             Neutrophils Absolute        6 05              1 85 - 7 62 *       Immature Grans Absolute     0 03              0 00 - 0 20 *       Lymphocytes Absolute        1 47              0 60 - 4 47 *       Monocytes Absolute          0 63              0 17 - 1 22 *       Eosinophils Absolute        0 10              0 00 - 0 61 *       Basophils Absolute          0 08              0 00 - 0 10 *  -Comprehensive metabolic panel:   Collection Time: 06/05/23 11:46 PM       Result                      Value             Ref Range           Sodium                      137               135 - 147 mm*       Potassium                   3 2 (L)           3 5 - 5 3 mm*       Chloride                    105               96 - 108 mmo*       CO2                         23                21 - 32 mmol*       ANION GAP                   9                 4 - 13 mmol/L       BUN                         10                5 - 25 mg/dL        Creatinine                  0 67              0 60 - 1 30 *       Glucose                     93                65 - 140 mg/*       Calcium                     8 5               8 4 - 10 2 m*       AST                         10 (L)            13 - 39 U/L         ALT                         8                 7 - 52 U/L          Alkaline Phosphatase        74                34 - 104 U/L        Total Protein               6 5               6 4 - 8 4 g/*       Albumin                     3 7               3 5 - 5 0 g/*       Total Bilirubin             0 36              0 20 - 1 00 *       eGFR                        102               ml/min/1 73s*  -HS Troponin 0hr (reflex protocol):   Collection Time: 06/05/23 11:46 PM       Result "    Value             Ref Range           hs TnI 0hr                  <2                \"Refer to [facility]*    Due to patient's history and presentation the following imaging was collected:  CTA ED chest PE Study   Final Result        No evidence for pulmonary embolus  No consolidation or effusion  Minimal perifissural groundglass opacity right lower lobe which may be infectious versus inflammatory in etiology  Correlate clinically  Workstation performed: [id number]         VAS lower limb venous duplex study, unilateral/limited    (Results Pending)  No results found  EKG: as above  Which were notable for: Mild hypokalemia of 3 2 and no evidence of pulmonary embolism  Patient was administered:        06/06/2023 0147 EDT sodium chloride 0 9 % bolus 1,000 mL 0 mL   Intravenous Stopped Trace Lizarraga RN     06/05/2023 2345 EDT sodium chloride 0 9 % bolus 1,000 mL 1,000 mL   Intravenous [address] Trace Lizarraga [facility]     06/06/2023 3579 EDT iohexol (OMNIPAQUE) 350 MG/ML injection   (SINGLE-DOSE) 85 mL 85 mL Intravenous Given Anthony Nolan     06/06/2023 0159 EDT potassium chloride (K-DUR,KLOR-CON) CR tablet 40 mEq   40 mEq Oral Given Trace Lizarraga RN      Patient was offered Lovenox, however declined at this time after discussion of risks and benefits  Patient appears well, is nontoxic appearing, expresses understanding and agrees with plan of care at this time  In light of this patient would benefit from outpatient management  Patient was rx'd: Ultrasound duplex for evaluation of DVT, instructed to have study done in the morning once available  Leg pain: acute illness or injury  Amount and/or Complexity of Data Reviewed  Labs: ordered  Radiology: ordered  Risk  Prescription drug management              Disposition  Final diagnoses:   Leg pain     Time reflects when diagnosis was documented in both MDM as applicable and the Disposition within this note     Time User " Action Codes Description Comment    6/6/2023  3:30 AM de Ilana Amandame Add [T36 474] Leg pain       ED Disposition     ED Disposition   Discharge    Condition   Stable    Date/Time   Tue Jun 6, 2023  3:30 AM    Comment   Isaac Mcclain discharge to home/self care  Follow-up Information     Follow up With Specialties Details Why Contact Info    Emilia Lerma MD [facility]   [address]  [address]  [address] [address] [address] [phone]            Patient's Medications   Discharge Prescriptions    No medications on file     Outpatient Discharge Orders   VAS lower limb venous duplex study, unilateral/limited   Standing Status: Future Standing Exp  Date: 06/06/27       PDMP Review       Value Time User    PDMP Reviewed  Yes 4/24/2023  2:30 PM Mae Muhammad MD           ED Provider  Attending physically available and evaluated Isaac Mcclain  I managed the patient along with the ED Attending      Electronically Signed by         rAacelis Fleming MD  06/06/23 6287

## 2023-06-06 NOTE — TELEPHONE ENCOUNTER
Pt went to ED  She had CT chest to r/o PE, which was negative  ED ordered a venous duplex to be done today  Called pt and advised her to call scheduling to arrange LEV

## 2023-06-08 LAB
ATRIAL RATE: 62 BPM
P AXIS: 22 DEGREES
PR INTERVAL: 176 MS
QRS AXIS: 4 DEGREES
QRSD INTERVAL: 78 MS
QT INTERVAL: 428 MS
QTC INTERVAL: 434 MS
T WAVE AXIS: 24 DEGREES
VENTRICULAR RATE: 62 BPM

## 2023-06-08 PROCEDURE — 93010 ELECTROCARDIOGRAM REPORT: CPT | Performed by: INTERNAL MEDICINE

## 2023-07-10 ENCOUNTER — TELEPHONE (OUTPATIENT)
Dept: HEMATOLOGY ONCOLOGY | Facility: CLINIC | Age: 53
End: 2023-07-10

## 2023-07-10 ENCOUNTER — OFFICE VISIT (OUTPATIENT)
Dept: VASCULAR SURGERY | Facility: CLINIC | Age: 53
End: 2023-07-10
Payer: COMMERCIAL

## 2023-07-10 VITALS
OXYGEN SATURATION: 99 % | WEIGHT: 210 LBS | DIASTOLIC BLOOD PRESSURE: 80 MMHG | BODY MASS INDEX: 33.75 KG/M2 | SYSTOLIC BLOOD PRESSURE: 102 MMHG | HEART RATE: 75 BPM | HEIGHT: 66 IN

## 2023-07-10 DIAGNOSIS — I83.811 VARICOSE VEINS OF LEG WITH PAIN, RIGHT: ICD-10-CM

## 2023-07-10 DIAGNOSIS — I80.9 THROMBOPHLEBITIS: Primary | ICD-10-CM

## 2023-07-10 PROCEDURE — 99214 OFFICE O/P EST MOD 30 MIN: CPT | Performed by: SURGERY

## 2023-07-10 NOTE — ASSESSMENT & PLAN NOTE
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 Likely provoked DVT from travel (flight to [facility] and 9 hr car ride). Hypercoagulability work-up was reportedly negative. Unable to find final thrombosis panel (performed in March per patient and was unremarkable). Reports progressively worsening right leg aching, swelling, pruritis and tingling which is affecting her daily lifestyle. She has tried multiple configurations of compression stockings all of which did not provide relief and exacerbated her symptoms. She tries to stay active. She recently went to the ED in June for recurrent right leg pain and shortness of breath. She now is also reporting some worsening aching in her left calf as well. LEVD 6/2023 - superficial thrombophlebitis in right lateral calf varicosity  CTA chest 6/2023 - negative  LEVDR 1/17/23 - Bilateral GSV/ASSV incompetence, no residual DVT noted  LEVD 9/2022 - Soleal vein DVT and AASV superficial thrombophlebitis   CTA chest 9/2022 - right sided PE    -We discussed the pathophysiology of venous disease, available treatment options and indications for treatment.  -Conservative measures did not help control her symptoms. She continues to have right leg swelling and aching. She is also going through menopause and overall does not feel well. She did not tolerate the daily use of gradient compression socks. Recommend periodic leg elevation and regular exercise.  -Will repeat thrombosis panel given recurrent thrombotic events off anticoagulation. Also unable to find thrombosis panel from March. Advised follow-up with hematology for completion.  -No current evidence of DVT on venous duplex study or deep venous incompetence, though I suspect she initially had much more extensive DVT which led to multiple right sided PE.  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 She has also had evidence of previous thrombophlebitis arising within this vein. Can consider AASV ablation with stab phlebectomies but she may be at increased risk of endovascular heat induced thrombosis or DVT given history as well as recurrence and worsening of symptoms. Previous DVT is also a relative contraindication to superficial vein ablation.  -She has follow-up with pulmonology. Will help arrange follow-up with hematology as well. Return to office in 3 months to discuss possible venous ablation.

## 2023-07-10 NOTE — TELEPHONE ENCOUNTER
Appointment Schedule   Who are you speaking with? Doc office   If it is not the patient, are they listed on an active communication consent form? yes   Which provider is the appointment scheduled with? Becky   At which location is the appointment scheduled for? [place]   When is the appointment scheduled? Please list date and time 7/28 11:30am   What is the reason for this appointment? Follow up   Did patient voice understanding of the details of this appointment? yes   Was the no show policy reviewed with patient?  yes

## 2023-07-10 NOTE — PROGRESS NOTES
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 Likely provoked DVT from travel (flight to [facility] and 9 hr car ride). Hypercoagulability work-up was reportedly negative. Unable to find final thrombosis panel (performed in March per patient and was unremarkable). Reports progressively worsening right leg aching, swelling, pruritis and tingling which is affecting her daily lifestyle. She has tried multiple configurations of compression stockings all of which did not provide relief and exacerbated her symptoms. She tries to stay active. She recently went to the ED in June for recurrent right leg pain and shortness of breath. She now is also reporting some worsening aching in her left calf as well. LEVD 6/2023 - superficial thrombophlebitis in right lateral calf varicosity  CTA chest 6/2023 - negative  LEVDR 1/17/23 - Bilateral GSV/ASSV incompetence, no residual DVT noted  LEVD 9/2022 - Soleal vein DVT and AASV superficial thrombophlebitis   CTA chest 9/2022 - right sided PE    -We discussed the pathophysiology of venous disease, available treatment options and indications for treatment.  -Conservative measures did not help control her symptoms. She continues to have right leg swelling and aching. She is also going through menopause and overall does not feel well. She did not tolerate the daily use of gradient compression socks. Recommend periodic leg elevation and regular exercise.  -Will repeat thrombosis panel given recurrent thrombotic events off anticoagulation. Also unable to find thrombosis panel from March. Advised follow-up with hematology for completion.  -No current evidence of DVT on venous duplex study or deep venous incompetence, though I suspect she initially had much more extensive DVT which led to multiple right sided PE.  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 She has also had evidence of previous thrombophlebitis arising within this vein. Can consider AASV ablation with stab phlebectomies but she may be at increased risk of endovascular heat induced thrombosis or DVT given history as well as recurrence and worsening of symptoms. Previous DVT is also a relative contraindication to superficial vein ablation.  -She has follow-up with pulmonology. Will help arrange follow-up with hematology as well. Return to office in 3 months to discuss possible venous ablation. Diagnoses and all orders for this visit:    Thrombophlebitis  -     Thrombosis Panel; Future    Varicose veins of leg with pain, right        I have spent 38 minutes with Patient  today in which greater than 50% of this time was spent in counseling/coordination of care regarding Intructions for management, Importance of tx compliance and Impressions. Subjective:      Patient ID: Randy Olivas is a 50 y.o. female. Patient is here today for a 6 week f/u exam and to review results of a LEV done 2023 and a LEVDR was done 2023. Patient is unable to wear compression stockings. Patient has a history of DVT and PE. She was diagnosed with Thrombophlebitis on 2023. Patient was discontinued from her Xarelto in 2023. Patient c/o bulging painful veins in her right leg than causes her leg to ache, feel tired and heavy and itch. She c/o pain in both of her feet. Patient is a former smoker. HPI  Ms. Colletta Orf is a 54yo female former smoker with anxiety, GERD, headaches, , right lower extremity DVT, superficial thrombophlebitis and PE 2022 completed 6 mo course anticoagulation, superficial thrombophlebitis 2023 who presents for follow-up. She has no significant family history of DVT or hypercoagulable disorders.  She does not remember her parents having significant varicosities. She has hypercoagulable testing which was ultimately unremarkable. Her DVT/PE was felt to be provoked by travel/inactivity (flight to [facility] and long 9 hr car ride). She reports progressively worsening right thigh and leg aching, tingling and pain in the foot, throbbing and swelling of the calf which she is starting to feel a bit developing in the left calf as well. She has tried wearing multiple variations of compression stockings both below knee and thigh high at different degrees of compression but felt that they only exacerbated her symptoms. Leg elevation does help but she does not do this regularly. She tries to stay active. The following portions of the patient's history were reviewed and updated as appropriate: allergies, current medications, past family history, past medical history, past social history, past surgical history and problem list.    Review of Systems   Constitutional: Negative. HENT: Negative. Eyes: Negative. Respiratory: Negative. Cardiovascular: Positive for leg swelling. Painful veins   Gastrointestinal: Negative. Endocrine: Negative. Genitourinary: Negative. Musculoskeletal: Negative. Pain in feet   Skin: Negative. Allergic/Immunologic: Negative. Neurological: Negative. Hematological: Negative. Psychiatric/Behavioral: Negative. I have personally reviewed the ROS entered by MA and agree as documented. Objective:      /80 (BP Location: Left arm, Patient Position: Sitting, Cuff Size: Large)   Pulse 75   Ht 5' 6" (1.676 m)   Wt 95.3 kg (210 lb)   SpO2 99%   BMI 33.89 kg/m²          Physical Exam  Constitutional:       Appearance: Normal appearance. HENT:      Head: Normocephalic and atraumatic. Cardiovascular:      Rate and Rhythm: Normal rate. Pulses: Normal pulses. Pulmonary:      Effort: Pulmonary effort is normal.   Abdominal:      Palpations: Abdomen is soft. Musculoskeletal:         General: Normal range of motion. Cervical back: Normal range of motion and neck supple. Skin:     General: Skin is warm. Capillary Refill: Capillary refill takes less than 2 seconds. Comments: Large cluster of varicosities extending from the mid right anterolateral thigh down the lateral aspect of the knee to the anterior shin and foot   Neurological:      General: No focal deficit present. Mental Status: She is alert and oriented to person, place, and time. Psychiatric:         Mood and Affect: Mood normal.         Behavior: Behavior normal.         Thought Content:  Thought content normal.         Judgment: Judgment normal.

## 2023-07-10 NOTE — PATIENT INSTRUCTIONS
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 Likely provoked DVT from travel (flight to [facility] and 9 hr car ride). Hypercoagulability work-up was reportedly negative. Unable to find final thrombosis panel (performed in March per patient and was unremarkable). Reports progressively worsening right leg aching, swelling, pruritis and tingling which is affecting her daily lifestyle. She has tried multiple configurations of compression stockings all of which did not provide relief and exacerbated her symptoms. She tries to stay active. She recently went to the ED in June for recurrent right leg pain and shortness of breath. She now is also reporting some worsening aching in her left calf as well. LEVD 6/2023 - superficial thrombophlebitis in right lateral calf varicosity  CTA chest 6/2023 - negative  LEVDR 1/17/23 - Bilateral GSV/ASSV incompetence, no residual DVT noted  LEVD 9/2022 - Soleal vein DVT and AASV superficial thrombophlebitis   CTA chest 9/2022 - right sided PE     -We discussed the pathophysiology of venous disease, available treatment options and indications for treatment.  -Conservative measures did not help control her symptoms. She continues to have right leg swelling and aching. She is also going through menopause and overall does not feel well. She did not tolerate the daily use of gradient compression socks. Recommend periodic leg elevation and regular exercise.  -Will repeat thrombosis panel given recurrent thrombotic events off anticoagulation. Also unable to find thrombosis panel from March. Advised follow-up with hematology for completion.  -No current evidence of DVT on venous duplex study or deep venous incompetence, though I suspect she initially had much more extensive DVT which led to multiple right sided PE.  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 She has also had evidence of previous thrombophlebitis arising within this vein. Can consider AASV ablation with stab phlebectomies but she may be at increased risk of endovascular heat induced thrombosis or DVT given history as well as recurrence and worsening of symptoms. Previous DVT is also a relative contraindication to superficial vein ablation.  -She has follow-up with pulmonology. Will help arrange follow-up with hematology as well. Return to office in 3 months to discuss possible venous ablation.

## 2023-07-12 ENCOUNTER — APPOINTMENT (OUTPATIENT)
Dept: LAB | Facility: HOSPITAL | Age: 53
End: 2023-07-12
Attending: SURGERY
Payer: COMMERCIAL

## 2023-07-12 ENCOUNTER — APPOINTMENT (OUTPATIENT)
Dept: LAB | Facility: HOSPITAL | Age: 53
End: 2023-07-12
Payer: COMMERCIAL

## 2023-07-12 DIAGNOSIS — I80.9 THROMBOPHLEBITIS: ICD-10-CM

## 2023-07-12 PROCEDURE — 85613 RUSSELL VIPER VENOM DILUTED: CPT

## 2023-07-12 PROCEDURE — 85306 CLOT INHIBIT PROT S FREE: CPT

## 2023-07-12 PROCEDURE — 85300 ANTITHROMBIN III ACTIVITY: CPT

## 2023-07-12 PROCEDURE — 85305 CLOT INHIBIT PROT S TOTAL: CPT

## 2023-07-12 PROCEDURE — 85303 CLOT INHIBIT PROT C ACTIVITY: CPT

## 2023-07-12 PROCEDURE — 86146 BETA-2 GLYCOPROTEIN ANTIBODY: CPT

## 2023-07-12 PROCEDURE — 81240 F2 GENE: CPT

## 2023-07-12 PROCEDURE — 85732 THROMBOPLASTIN TIME PARTIAL: CPT

## 2023-07-12 PROCEDURE — 86147 CARDIOLIPIN ANTIBODY EA IG: CPT

## 2023-07-12 PROCEDURE — 36415 COLL VENOUS BLD VENIPUNCTURE: CPT

## 2023-07-12 PROCEDURE — 85670 THROMBIN TIME PLASMA: CPT

## 2023-07-12 PROCEDURE — 85705 THROMBOPLASTIN INHIBITION: CPT

## 2023-07-12 PROCEDURE — 81241 F5 GENE: CPT

## 2023-07-13 LAB
DEPRECATED AT III PPP: 94 % OF NORMAL (ref 92–136)
PROT C AG ACT/NOR PPP IA: 93 % OF NORMAL (ref 60–150)
PROT S ACT/NOR PPP: 78 % (ref 68–108)

## 2023-07-14 LAB
APTT SCREEN TO CONFIRM RATIO: 0.95 RATIO (ref 0–1.34)
B2 GLYCOPROT1 IGA SERPL IA-ACNC: 1.7
B2 GLYCOPROT1 IGG SERPL IA-ACNC: 1
B2 GLYCOPROT1 IGM SERPL IA-ACNC: <2.4
CARDIOLIPIN IGA SER IA-ACNC: 1.8
CARDIOLIPIN IGG SER IA-ACNC: 1.3
CARDIOLIPIN IGM SER IA-ACNC: 2.9
CONFIRM APTT/NORMAL: 33.5 SEC (ref 0–47.6)
LA PPP-IMP: NORMAL
PROT S ACT/NOR PPP: 95 % (ref 61–136)
PROT S PPP-ACNC: 84 % (ref 60–150)
SCREEN APTT: 35.7 SEC (ref 0–43.5)
SCREEN DRVVT: 33.5 SEC (ref 0–47)
THROMBIN TIME: 16 SEC (ref 0–23)

## 2023-07-17 LAB
F5 GENE MUT ANL BLD/T: NORMAL
Lab: NORMAL

## 2023-07-18 LAB
F2 GENE MUT ANL BLD/T: NORMAL
Lab: NORMAL

## 2023-07-28 ENCOUNTER — OFFICE VISIT (OUTPATIENT)
Dept: HEMATOLOGY ONCOLOGY | Facility: CLINIC | Age: 53
End: 2023-07-28
Payer: COMMERCIAL

## 2023-07-28 VITALS
OXYGEN SATURATION: 98 % | HEART RATE: 71 BPM | SYSTOLIC BLOOD PRESSURE: 104 MMHG | RESPIRATION RATE: 16 BRPM | TEMPERATURE: 97.2 F | DIASTOLIC BLOOD PRESSURE: 70 MMHG | HEIGHT: 66 IN | BODY MASS INDEX: 33.91 KG/M2 | WEIGHT: 211 LBS

## 2023-07-28 DIAGNOSIS — I83.811 VARICOSE VEINS OF LEG WITH PAIN, RIGHT: ICD-10-CM

## 2023-07-28 DIAGNOSIS — I26.99 OTHER ACUTE PULMONARY EMBOLISM WITHOUT ACUTE COR PULMONALE (HCC): ICD-10-CM

## 2023-07-28 DIAGNOSIS — I82.461 ACUTE DEEP VEIN THROMBOSIS (DVT) OF CALF MUSCLE VEIN OF RIGHT LOWER EXTREMITY (HCC): ICD-10-CM

## 2023-07-28 DIAGNOSIS — I80.01 THROMBOPHLEBITIS OF SUPERFICIAL VEINS OF RIGHT LOWER EXTREMITY: Primary | ICD-10-CM

## 2023-07-28 PROCEDURE — 99215 OFFICE O/P EST HI 40 MIN: CPT

## 2023-07-28 RX ORDER — ASPIRIN 81 MG/1
81 TABLET ORAL DAILY
Start: 2023-07-28

## 2023-07-28 NOTE — PROGRESS NOTES
[address] [facility] [address]  Hematology Ambulatory Follow-Up  Benson Velázquez, 1970, [id number]  7/28/2023    Assessment/Plan:  1. 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  2. Varicose veins of leg with pain, right  3. Other acute pulmonary embolism without acute cor pulmonale ([address])  4. Acute deep vein thrombosis (DVT) of calf muscle vein of right lower extremity ([address])  Ms. Erica Perez is a 79-year-old female seen in follow-up for anticoagulation recommendations. She was previously seen after being diagnosed with a provoked acute DVT in the right calf as well as thrombophlebitis of the right lower extremity with multiple PEs of the right lung without RV strain. She was treated with 3 months of Eliquis and then transition to 3 months of Xarelto which she did not tolerate either of these medications well. She experienced extreme side effects with menstrual bleeding, fatigue, weakness, and weight gain. After completing 6 months of anticoagulation her symptoms have never truly resolved. She has persistent chest discomfort and pain for which she is seeing pulmonology for most recent CTA PE scan was negative. She has also had persistent right lower extremity pain likely associated to her varicose veins. Most recent venous duplex does demonstrate superficial thrombophlebitis in the lateral calf varicosities. For this problem she has been evaluated by vascular surgery who are hesitant to undergo surgical procedures at this time due to her history. She has had complete thrombophilia work-up which was negative. The original VTE event was provoked and also her first occurrence. I had a long discussion with the patient and her  regarding the use of prophylactic anticoagulation dosing for treatment of superficial thrombophlebitis in the setting of history of VTE and varicose veins.   They are both very hesitant to restart her on either Eliquis or Xarelto at this time due to her history of significant side effects. For now they are willing to try a short course of aspirin 81 mg once daily along with rest, elevation, and ice of the leg in hopes to help her symptoms subside. They do not wish to continue on lifelong/indefinite anticoagulation due to the risks of bleeding as well as side effects. I did give them a sample of 1 week of Eliquis 2.5 mg as they are going on a car ride to [place] to take their son to college. And if her symptoms are not improving with the aspirin I would like to do a trial of 45 days of prophylactic anticoagulation dosing. She will reach out to the office if she decides to start this medication for further doses provided. I will see her back in follow-up in about 4 weeks to assess her symptoms and discuss management moving forward. We also reviewed signs and symptoms in which she should seek emergency room care for with worsening pain, swelling, numbness, tingling in her leg along with acute onset shortness of breath, chest tightness or chest pain. - aspirin (ECOTRIN LOW STRENGTH) 81 mg EC tablet; Take 1 tablet (81 mg total) by mouth in the morning    The patient is scheduled for follow-up in approximately 4 weeks ([facility]). Patient voiced agreement and understanding to the above. Patient knows to call the Hematology/Oncology office with any questions and concerns regarding the above. Barrier(s) to care: None  The patient is able to self care.  ------------------------------------------------------------------------------------------------------    Chief Complaint   Patient presents with   • Follow-up       History of present illness:   Boubacar Humphries is a 62-year-old female seen in follow-up for anticoagulation recommendations.   She was originally seen in consultation in December 2022 after being diagnosed with DVT with thrombophlebitis and pulmonary embolism in September 2022.  On 9/19/2022 she was seen in the emergency room for increased varicose veins of the right lower extremity with pain. She was found to have a acute occlusive DVT of the soleal vein in the right calf with superficial thrombophlebitis in the anterior accessory saphenous vein at the proximal and mid thigh as well as the varicosities in the distal lateral thigh. CTA PE demonstrated multiple pulmonary emboli the largest within the distal right main artery extending to the right middle and lower lobe are branches and pulmonary emboli within the segmental branches within the bilateral upper and lower lobes. No evidence of RV strain. Thrombosis panel was drawn at that time during an acute clot demonstrated a slightly decreased Antithrombin III activity otherwise normal.  She was discharged on Eliquis for 3 months and took her last dose on 12/20/2022. She states that she did not tolerate this very well as it made her feel very fatigued, severe muscle weakness, and states that she gained about 18 pounds since starting the medication. She states that she still has continued swelling and pain of the right lower extremity with some occasional pain in the left as well. She currently has a slight cold but also feels very short of breath with a persistent cough, similar to when she was first diagnosed. States that while on Eliquis she did have heavy menses. Prior to being diagnosed with a DVT she had a 6-hour car ride as well as a trip to [facility] in the months leading up to this event. She also has chronic varicose veins. Interval history: She is seen today in follow-up with a new diagnosis of superficial thrombophlebitis in her right lower calf. Her symptoms never completely resolved after 6 months of anticoagulation therapy was completed. She has been having issues with pain in her right leg with prior venous duplex being negative in January.   Most recent venous duplex in June demonstrated no evidence of DVT but has superficial thrombophlebitis of varicosity in lateral proximal calf of the right lower limb. She has been evaluated by vascular surgery who are hesitant to perform surgical intervention at this time. Patient is not tolerating compression stockings and the pain is unbearable at times. She is also seeing pulmonology in follow-up with persistent shortness of breath and discomfort. Repeat thrombosis panel in July was completely negative with no thrombophilia diagnoses. She did not tolerate Xarelto very well with heavy menstrual bleeding. She does have a follow-up with OB/GYN next month to hopefully undergo testing to see if she is close to menopause. She is up-to-date on cancer screenings including Cologuard, mammogram, and Pap smears. Review of Systems   Constitutional: Positive for fatigue. Negative for activity change, appetite change, diaphoresis, fever and unexpected weight change (weight gain). HENT: Negative for trouble swallowing and voice change. Eyes: Negative for photophobia and visual disturbance. Respiratory: Positive for chest tightness and shortness of breath. Negative for cough. Cardiovascular: Negative for chest pain, palpitations and leg swelling. Gastrointestinal: Negative for abdominal distention, blood in stool, constipation, diarrhea and nausea. Endocrine: Negative for cold intolerance and heat intolerance. Genitourinary: Positive for menstrual problem. Negative for dysuria, hematuria and urgency. Musculoskeletal: Positive for arthralgias and myalgias. Negative for back pain, gait problem and joint swelling. Right leg discomfort/pain   Skin: Negative for pallor and rash. Neurological: Negative for dizziness, weakness, light-headedness and headaches. Hematological: Negative for adenopathy. Does not bruise/bleed easily. Psychiatric/Behavioral: Negative for confusion and sleep disturbance.        Patient Active Problem List   Diagnosis   • Anxiety • Keratosis pilaris   • Primary insomnia   • Varicose veins of leg with pain, right   • 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   • Pulmonary emboli (HCC)   • Migraines   • DVT, lower extremity, distal, acute, right (HCC)   • GERD (gastroesophageal reflux disease)       Past Medical History:   Diagnosis Date   • Allergic     as long as i can remember dust pollen etc   • Anemia     i can tend to be anemic i control through diet & supplementn   • Anxiety     as long as i can remember   • GERD (gastroesophageal reflux disease)     i have it on occasion   • Headache(784.0)     i get tension headaches alot       Past Surgical History:   Procedure Laterality Date   •  SECTION  2005    2nd son's birth   • TUBAL LIGATION      after second son was born in 48       Family History   Problem Relation Age of Onset   • Hypertension Father    • Breast cancer Paternal Grandmother    • Diabetes Paternal Grandmother    • Substance Abuse Neg Hx    • Mental illness Neg Hx        Social History     Socioeconomic History   • Marital status: /Civil Union     Spouse name: None   • Number of children: None   • Years of education: None   • Highest education level: None   Occupational History   • None   Tobacco Use   • Smoking status: Former     Packs/day: 0.25     Types: Cigarettes, E-Cigarettes     Start date:      Quit date: 2022     Years since quittin.9   • Smokeless tobacco: Never   • Tobacco comments:     havent smoked in a long time   Vaping Use   • Vaping Use: Former   • Substances: Flavoring   Substance and Sexual Activity   • Alcohol use: Not Currently     Comment: socially   • Drug use: Never   • Sexual activity: Yes     Partners: Male     Birth control/protection: Surgical     Comment: Tubal ligation   Other Topics Concern   • None   Social History Narrative   • None     Social Determinants of Health     Financial Resource Strain: Not on file   Food Insecurity: Not on file   Transportation Needs: Not on file   Physical Activity: Not on file   Stress: Not on file   Social Connections: Not on file   Intimate Partner Violence: Not on file   Housing Stability: Not on file         Current Outpatient Medications:   •  Acetaminophen 500 MG, Take 2 capsules (1,000 mg total) by mouth every 6 (six) hours as needed for mild pain, Disp: 100 capsule, Rfl: 10  •  albuterol (Proventil HFA) 90 mcg/act inhaler, Inhale 2 puffs every 6 (six) hours as needed for wheezing or shortness of breath, Disp: 6.7 g, Rfl: 5  •  ALPRAZolam (XANAX) 0.5 mg tablet, Take 1 tablet (0.5 mg total) by mouth 4 (four) times a day as needed for anxiety, Disp: 120 tablet, Rfl: 5  •  aspirin (ECOTRIN LOW STRENGTH) 81 mg EC tablet, Take 1 tablet (81 mg total) by mouth in the morning, Disp: , Rfl:   •  benzonatate (TESSALON) 200 MG capsule, Take 1 capsule (200 mg total) by mouth 3 (three) times a day as needed for cough, Disp: 20 capsule, Rfl: 0  •  dicyclomine (BENTYL) 20 mg tablet, Take 1 tablet (20 mg total) by mouth 3 (three) times a day as needed (bowel cramps), Disp: 60 tablet, Rfl: 0  •  fluticasone (FLONASE) 50 mcg/act nasal spray, 1 spray into each nostril daily, Disp: 11.1 mL, Rfl: 1  •  pantoprazole (PROTONIX) 40 mg tablet, Take 1 tablet (40 mg total) by mouth daily before breakfast, Disp: 30 tablet, Rfl: 2  •  RA Acetaminophen Ex St 500 MG tablet, Take 1,000 mg by mouth every 6 (six) hours as needed, Disp: , Rfl:     Allergies   Allergen Reactions   • Iodine I 131 Tositumomab Rash     OK for CT per Dr Ernestine Katz   • Doxycycline GI Intolerance and Vomiting   • Latex    • Other Hives       Objective:  /70 (BP Location: Left arm, Patient Position: Sitting, Cuff Size: Adult)   Pulse 71   Temp (!) 97.2 °F (36.2 °C)   Resp 16   Ht 5' 6" (1.676 m)   Wt 95.7 kg (211 lb)   SpO2 98%   BMI 34.06 kg/m²    Physical Exam  Constitutional:       General: She is not in acute distress. Appearance: Normal appearance. She is not ill-appearing. HENT:      Head: Normocephalic and atraumatic. Eyes:      Extraocular Movements: Extraocular movements intact. Conjunctiva/sclera: Conjunctivae normal.      Pupils: Pupils are equal, round, and reactive to light. Cardiovascular:      Rate and Rhythm: Normal rate and regular rhythm. Pulses: Normal pulses. Pulmonary:      Effort: Pulmonary effort is normal. No respiratory distress. Abdominal:      General: There is no distension. Tenderness: There is no abdominal tenderness. Musculoskeletal:         General: Normal range of motion. Cervical back: Normal range of motion. No tenderness. Right lower leg: No edema. Left lower leg: No edema. Lymphadenopathy:      Cervical: No cervical adenopathy. Skin:     Capillary Refill: Capillary refill takes less than 2 seconds. Coloration: Skin is not jaundiced or pale. Findings: No rash. Comments: Varicose veins of right lower leg   Neurological:      General: No focal deficit present. Mental Status: She is alert and oriented to person, place, and time. Motor: No weakness. Gait: Gait normal.   Psychiatric:         Mood and Affect: Mood normal.         Behavior: Behavior normal.         Thought Content: Thought content normal.         Judgment: Judgment normal.         Result Review  Labs:  Appointment on 07/12/2023   Component Date Value Ref Range Status   • AntiThrombIN III Activity 07/12/2023 94  92 - 136 % of Normal Final   • ANTICARDIOLIPIN IGG ANTIBODY 07/12/2023 1.3  See comment Final   • ANTICARDIOLIPIN IGA ANTIBODY 07/12/2023 1.8  See comment Final   • ANTICARDIOLIPIN IGM ANTIBODY 07/12/2023 2.9  See comment Final   • Factor V Leiden 07/12/2023 Comment   Final    Comment: Result: c.1601G>A (p.[id number]) - Not Detected  This result is not associated with an increased risk for venous  thromboembolism. See Additional Clinical Information and  Comments.   Additional Clinical Information:  Venous thromboembolism is a multifactorial disease influenced by  genetic, environmental, and circumstantial risk factors. The c.1601G>A  (p. Xdu080Obe) variant in the F5 gene, commonly referred to as Factor  V Leiden, is a genetic risk factor for venous thromboembolism. Heterozygous carriers of this variant have a 6- to 8-fold increased  risk for venous thromboembolism. Individuals homozygous for this  variant (ie, with a copy of the variant on each chromosome) have an  approximately 80-fold increased risk for venous thromboembolism. Individuals who carry both a c.*97G>A variant in the F2 gene and  Factor V Leiden have an approximately 20-fold increased risk for  venous thromboembolism. Risks are likely to be even higher in more  complex genotype combinations involving                            the F2 c.*97G>A variant and  Factor V Leiden (PMID: 94539273). Additional risk factors include but  are not limited to: deficiency of protein C, protein S, or  antithrombin III, age, male sex, personal or family history of deep  vein thromboembolism, smoking, surgery, prolonged immobilization,  malignant neoplasm, tamoxifen treatment, raloxifene treatment, oral  contraceptive use, hormone replacement therapy, and pregnancy. Management of thrombotic risk and thrombotic events should follow  established guidelines and fit the clinical circumstance. This result  cannot predict the occurrence or recurrence of a thrombotic event. Comment:  Genetic counseling is recommended to discuss the potential clinical  implications of positive results, as well as recommendations for  testing family members. Genetic Coordinators are available for health care providers to  discuss results at [phone] ([phone]).   Test Details:  Variant Analyzed: c.1601G>A (p. [id number]), referred to as Factor                            V  Leiden  Methods/Limitations:  DNA analysis of the F5 gene (NM_000130.5) was performed by PCR  amplification followed by restriction enzyme analysis. The diagnostic  sensitivity is >99%. Results must be combined with clinical  information for the most accurate interpretation. Molecular-based  testing is highly accurate, but as in any laboratory test, diagnostic  errors may occur. False positive or false negative results may occur  for reasons that include genetic variants, blood transfusions, bone  marrow transplantation, somatic or tissue-specific mosaicism,  mislabeled samples, or erroneous representation of family  relationships. This test was developed and its performance characteristics  determined by [address] [place] [address]. It has not been cleared or approved by the  Food and Drug Administration. References:  [facility], Alayna Ocampoent; [facility] Professional  Practice and Guidelines Committee. Addendum: 4509 [facility] consensus statement on factor                            V Leiden mutation  testing. [facility] Med. 2021 Mar 5. doi: 10.1038/[id number]-z. PMID: 92851380. Lidia Howard. Factor V Leiden Thrombophilia. 1999 May 14  (Updated 2018 Jan 4). In: Ke Come, Abril HH, Carmen RA, et al.,  editors. NJOY(R) (Internet). [address] [place] [address] ([address] [place] [address]): [address]; 3100-8308. Available from:  appEatITRosa Reyes Helene Lo CS;  [facility] Laboratory  Committee. Venous thromboembolism  laboratory testing (factor V Leiden and factor II c.*97G>A), 2018  update: a technical standard of the [address] [place] [address] ([facility]). Sandy Med. 2018 Dec;20(12):7249-0653.  doi: 88.4817/[id number]-J. Epub 2018 Oct 5. PMID: 32343002.    • REVIEWED BY: 07/12/2023 Comment   Final    Technical Component performed at [address] [place] [address] RTP  Professional Component performed by:  Sandy Ramires, Ph.D., [facility]  Director, Maeve Appiah [id number] • PTT Lupus Anticoagulant 07/12/2023 35.7  0.0 - 43.5 sec Final   • Dilute Viper Venom Time 07/12/2023 33.5  0.0 - 47.0 sec Final   • DILUTE PROTHROMBIN TIME(DPT) 07/12/2023 33.5  0.0 - 47.6 sec Final   • THROMBIN TIME (DRVW) 07/12/2023 16.0  0.0 - 23.0 sec Final   • DPT CONFIRM RATIO 07/12/2023 0.95  0.00 - 1.34 Ratio Final   • LUPUS REFLEX INTERPRETATION 07/12/2023 Comment:   Final    No lupus anticoagulant was detected. • Protein C Activity 07/12/2023 93.0  60 - 150 % of Normal Final   • PROTEIN S ACTIVITY 07/12/2023 78  68 - 108 % Final   • Protein S Ag, Total 07/12/2023 84  60 - 150 % Final    This test was developed and its performance characteristics  determined by [facility]. It has not been cleared or approved  by the Food and Drug Administration. • Protein S Ag, Free 07/12/2023 95  61 - 136 % Final   • Prothrombin Mutation 07/12/2023 Comment   Final    Comment: Result: c.*97G>A - Not Detected  This result is not associated with an increased risk for venous  thromboembolism. See Additional Clinical Information and  Comments. Additional Clinical Information:  Venous thromboembolism is a multifactorial disease influenced by  genetic, environmental, and circumstantial risk factors. The c.*97G>A  variant in the F2 gene is a genetic risk factor for venous  thromboembolism. Heterozygous carriers have a 2- to 4-fold increased  risk for venous thromboembolism. Homozygotes for the c.*97G>A variant  are rare. The annual risk of VTE in homozygotes has been reported to  be 1.1%/year. Individuals who carry both a c.*97G>A variant in the  F2 gene and a c.1601G>A (p. Ojf540Gcs) variant in the F5 gene  (commonly referred to as Factor V Leiden) have an approximately 20-  fold increased risk for venous thromboembolism. Risks are likely to  be even higher in more complex genotype combinations involving the  F2 c.*97G>A variant and Factor V Leiden (PMID:                            [id number]).  Additional  risk factors include but are not limited to: deficiency of protein C,  protein S, or antithrombin III, age, male sex, personal or family  history of deep vein thromboembolism, smoking, surgery, prolonged  immobilization, malignant neoplasm, tamoxifen treatment, raloxifene  treatment, oral contraceptive use, hormone replacement therapy, and  pregnancy. Management of thrombotic risk and thrombotic events should  follow established guidelines and fit the clinical circumstance. This  result cannot predict the occurrence or recurrence of a thrombotic  event. Comments:  Genetic counseling is recommended to discuss the potential clinical  implications of positive results, as well as recommendations for  testing family members. Genetic Coordinators are available for health care providers to discuss  results at [phone] ([phone]). Test Details:  Variant analyzed: c.*97G>A, previously referred to as X19024C  Methods/Limitations:  DNA analysis of the F2 gene                            (NM_000506. 5) was performed by PCR  amplification followed by restriction enzyme analysis. The diagnostic  sensitivity is >99%. Results must be combined with clinical  information for the most accurate interpretation. Molecular-based  testing is highly accurate, but as in any laboratory test, diagnostic  errors may occur. False positive or false negative results may occur  for reasons that include genetic variants, blood transfusions, bone  marrow transplantation, somatic or tissue-specific mosaicism,  mislabeled samples, or erroneous representation of family  relationships. This test was developed and its performance characteristics determined  by [facility]. It has not been cleared or approved by the Food and Drug  Administration. References:  [facility], Eliseo Dai; [facility] Professional  Practice and Guidelines Committee. Addendum: 0585 [facility] [place] consensus statement on factor V Leiden mutation  testing. [facility] Med. 2021 Mar 5. doi:                            10.1038/[id number]-w. PMID: 41597411. Eddi Allyssa. Prothrombin Thrombophilia. 2006  [Updated 2021]. In: Melissa Arizmendi, Abril HH, Carmen RA, et al.,  editors. Lyndon(R) [Internet]. [address] [place] [address] ([facility] [facility] [address]): [facility]  [facility], [address] [place] [address]; 6693-3838. Available from:  [place].emilee Segura, Domi Abraham, Devante SANTOYO;  [facility] Laboratory  Committee. Venous thromboembolism  laboratory testing (factor V Leiden and factor II c.*97G>A),  2018 update: a technical standard of the [address] [place] [address] ([facility]). Sandy Med. 2018 Dec;20(12):7300-8727.  doi: 64.9405/[id number]-Y. Epub 2018 Oct 5. PMID: 20480967. • REVIEWED BY: 2023 Walt Rose, PhD   Final   • BETA 2 GLYCO 1 IGG 2023 1.0  See comment Final   • BETA 2 GLYCO 1 IGA 2023 1.7  See comment Final   • BETA 2 GLYCO 1 IGM 2023 <2.4  See comment Final       Imagin2023  Venous Duplex  RIGHT LOWER LIMB:  No evidence of acute or chronic deep vein thrombosis. Evidence of superficial thrombophlebitis noted of a varicosity in the lateral  proximal calf. Doppler evaluation shows a normal response to augmentation maneuvers. Popliteal, posterior tibial and anterior tibial arterial Doppler waveform's are  triphasic. LEFT LOWER LIMB:  No evidence of acute or chronic deep vein thrombosis. No evidence of superficial thrombophlebitis noted. Doppler evaluation shows a normal response to augmentation maneuvers. Popliteal, posterior tibial and anterior tibial arterial Doppler waveform's are  Triphasic. CTA PE study   No evidence for pulmonary embolus. No consolidation or effusion. Minimal perifissural groundglass opacity right lower lobe which may be infectious versus inflammatory in etiology. Correlate clinically. Please note:   This report has been generated by a voice recognition software system. Therefore there may be syntax, spelling, and/or grammatical errors. Please call if you have any questions.

## 2023-07-31 ENCOUNTER — HOSPITAL ENCOUNTER (OUTPATIENT)
Dept: RADIOLOGY | Facility: HOSPITAL | Age: 53
Discharge: HOME | End: 2023-07-31
Payer: COMMERCIAL

## 2023-07-31 ENCOUNTER — OFFICE VISIT (OUTPATIENT)
Dept: PULMONOLOGY | Facility: CLINIC | Age: 53
End: 2023-07-31
Payer: COMMERCIAL

## 2023-07-31 VITALS
OXYGEN SATURATION: 99 % | DIASTOLIC BLOOD PRESSURE: 80 MMHG | BODY MASS INDEX: 33.72 KG/M2 | HEART RATE: 78 BPM | HEIGHT: 66 IN | WEIGHT: 209.8 LBS | SYSTOLIC BLOOD PRESSURE: 98 MMHG | RESPIRATION RATE: 18 BRPM

## 2023-07-31 DIAGNOSIS — E66.09 CLASS 1 OBESITY DUE TO EXCESS CALORIES WITH BODY MASS INDEX (BMI) OF 33.0 TO 33.9 IN ADULT, UNSPECIFIED WHETHER SERIOUS COMORBIDITY PRESENT: ICD-10-CM

## 2023-07-31 DIAGNOSIS — R06.02 CHRONIC SHORTNESS OF BREATH: ICD-10-CM

## 2023-07-31 DIAGNOSIS — R06.02 SHORTNESS OF BREATH: ICD-10-CM

## 2023-07-31 DIAGNOSIS — Z86.711 HISTORY OF PULMONARY EMBOLISM: Primary | ICD-10-CM

## 2023-07-31 DIAGNOSIS — I83.891 SYMPTOMATIC VARICOSE VEINS, RIGHT: ICD-10-CM

## 2023-07-31 DIAGNOSIS — Z86.711 HISTORY OF PULMONARY EMBOLISM: ICD-10-CM

## 2023-07-31 PROCEDURE — 71046 X-RAY EXAM CHEST 2 VIEWS: CPT

## 2023-07-31 PROCEDURE — 99214 OFFICE O/P EST MOD 30 MIN: CPT | Performed by: INTERNAL MEDICINE

## 2023-07-31 NOTE — PROGRESS NOTES
Pulmonary Follow Up Note   Gaudencio Alvarado 48 y.o. female MRN: [id number]  7/31/2023      Assessment:    1. History of pulmonary embolism  -Completed a total of 6 months of anticoagulation (3 months of Eliquis and 3 months of Xarelto). This was presumed provoked based on varicose vein DVTs. 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 She is following up with hematology for this with recommendation for prophylactic anticoagulation however she is opting for low-dose aspirin  -     NM lung ventilation / perfusion; Future; Expected date: 07/31/2023  -     XR chest pa & lateral; Future; Expected date: 07/31/2023    2. Chronic shortness of breath  -Given the history of PE, she could have underlying chronic thromboembolic pulmonary hypertension. Although this is less likely given her normal cardiac echo at the time. Her shortness of breath may also be related to obesity  Plan:  -Check a chest x-ray and a VQ scan  -     NM lung ventilation / perfusion; Future; Expected date: 07/31/2023  -     XR chest pa & lateral; Future; Expected date: 07/31/2023    3. Shortness of breath  -Suspect primarily due to obesity. Differential would also include chronic thromboembolic pulmonary hypertension or underlying COPD given her history of smoking (although she quit 20 years ago)  Plan:  - We will check complete pulmonary function testing  -     Complete PFT with post bronchodilator; Future    4. Symptomatic varicose veins, right  -Along with superficial thrombophlebitis of the right lower extremity. She is following up with hematology with recommendation for prophylactic anticoagulation however she is opting for low-dose aspirin. She asked me whether she can have vein ablation from a vascular surgery standpoint.   I informed her that if she is having recurrent DVTs then she can consider it however I would recommend that she be on anticoagulation as soon as possible after the procedure and would probably recommend her to be on it for several months with right lower extremity Dopplers to ensure she does not develop a DVT    5. Class 1 obesity due to excess calories with body mass index (BMI) of 33.0 to 33.9 in adult, unspecified whether serious comorbidity present  -Down 7 pounds. I congratulated her on her efforts and weight loss and encouraged her to continue losing weight. I also offered her a sleep study but she would like to hold off for now    Plan:    Diagnoses and all orders for this visit:    History of pulmonary embolism  -     NM lung ventilation / perfusion; Future  -     XR chest pa & lateral; Future    Chronic shortness of breath  -     NM lung ventilation / perfusion; Future  -     XR chest pa & lateral; Future    Shortness of breath  -     Complete PFT with post bronchodilator; Future    Symptomatic varicose veins, right    Class 1 obesity due to excess calories with body mass index (BMI) of 33.0 to 33.9 in adult, unspecified whether serious comorbidity present        Return in about 3 months (around 10/31/2023). History of Present Illness   HPI:  Karrie Ramos is a 48 y.o. female who presents for follow-up of shortness of breath. Since her last visit she reports only minimal improvement of her shortness of breath. She mainly feels this when she is doing any activity. She denies any significant coughing. She continues to follow-up with hematology for DVTs and recently she had imaging study which showed superficial thrombophlebitis of varicose veins in her right lower extremity. She was recommended anticoagulation but opted for low-dose aspirin in the meantime. Review of Systems   Constitutional: Negative for appetite change, chills, diaphoresis, fatigue and fever. HENT: Positive for ear pain and sneezing. Negative for congestion, postnasal drip, rhinorrhea, sore throat, trouble swallowing and voice change. Eyes: Negative for redness and itching. Respiratory: Positive for cough and shortness of breath. Negative for apnea, choking, chest tightness, wheezing and stridor. Cardiovascular: Positive for chest pain. Negative for palpitations and leg swelling. Gastrointestinal: Negative for abdominal distention, abdominal pain, blood in stool, constipation, diarrhea, nausea and vomiting. Endocrine: Negative for polydipsia and polyuria. Genitourinary: Negative for dysuria and flank pain. Musculoskeletal: Positive for myalgias. Negative for arthralgias, back pain and joint swelling. Skin: Negative for pallor and rash. Neurological: Positive for headaches. Negative for dizziness, syncope, weakness, light-headedness and numbness. Hematological: Negative for adenopathy. Psychiatric/Behavioral: Negative for agitation.        Historical Information   Past Medical History:   Diagnosis Date   • Allergic     as long as i can remember dust pollen etc   • Anemia     i can tend to be anemic i control through diet & supplementn   • Anxiety     as long as i can remember   • GERD (gastroesophageal reflux disease)     i have it on occasion   • Headache(784.0)     i get tension headaches alot     Past Surgical History:   Procedure Laterality Date   •  SECTION  2005    2nd son's birth   • TUBAL LIGATION      after second son was born in 48     Family History   Problem Relation Age of Onset   • Hypertension Father    • Breast cancer Paternal Grandmother    • Diabetes Paternal Grandmother    • Substance Abuse Neg Hx    • Mental illness Neg Hx          Meds/Allergies     Current Outpatient Medications:   •  Acetaminophen 500 MG, Take 2 capsules (1,000 mg total) by mouth every 6 (six) hours as needed for mild pain, Disp: 100 capsule, Rfl: 10  •  albuterol (Proventil HFA) 90 mcg/act inhaler, Inhale 2 puffs every 6 (six) hours as needed for wheezing or shortness of breath, Disp: 6.7 g, Rfl: 5  •  ALPRAZolam (XANAX) 0.5 mg tablet, Take 1 tablet (0.5 mg total) by mouth 4 (four) times a day as needed for anxiety, Disp: 120 tablet, Rfl: 5  •  aspirin (ECOTRIN LOW STRENGTH) 81 mg EC tablet, Take 1 tablet (81 mg total) by mouth in the morning, Disp: , Rfl:   •  benzonatate (TESSALON) 200 MG capsule, Take 1 capsule (200 mg total) by mouth 3 (three) times a day as needed for cough, Disp: 20 capsule, Rfl: 0  •  dicyclomine (BENTYL) 20 mg tablet, Take 1 tablet (20 mg total) by mouth 3 (three) times a day as needed (bowel cramps), Disp: 60 tablet, Rfl: 0  •  fluticasone (FLONASE) 50 mcg/act nasal spray, 1 spray into each nostril daily, Disp: 11.1 mL, Rfl: 1  •  pantoprazole (PROTONIX) 40 mg tablet, Take 1 tablet (40 mg total) by mouth daily before breakfast, Disp: 30 tablet, Rfl: 2  •  RA Acetaminophen Ex St 500 MG tablet, Take 1,000 mg by mouth every 6 (six) hours as needed, Disp: , Rfl:   Allergies   Allergen Reactions   • Iodine I 131 Tositumomab Rash     OK for CT per Dr Briseno Salts   • Doxycycline GI Intolerance and Vomiting   • Latex    • Other Hives       Vitals: Blood pressure 98/80, pulse 78, resp. rate 18, height 5' 6" (1.676 m), weight 95.2 kg (209 lb 12.8 oz), SpO2 99 %. Body mass index is 33.86 kg/m². Oxygen Therapy  SpO2: 99 %  Oxygen Therapy: None (Room air)      Physical Exam  Physical Exam  Constitutional:       General: She is not in acute distress. Appearance: She is not diaphoretic. HENT:      Head: Normocephalic and atraumatic. Nose: Nose normal.      Mouth/Throat:      Pharynx: No oropharyngeal exudate. Eyes:      General: No scleral icterus. Conjunctiva/sclera: Conjunctivae normal.      Pupils: Pupils are equal, round, and reactive to light. Neck:      Thyroid: No thyromegaly. Vascular: No JVD. Trachea: No tracheal deviation.    Cardiovascular:      Rate and Rhythm: Normal rate and regular rhythm. Heart sounds: Normal heart sounds. No murmur heard. No friction rub. No gallop. Pulmonary:      Effort: Pulmonary effort is normal. No respiratory distress. Breath sounds: Normal breath sounds. No stridor. No wheezing or rales. Abdominal:      General: Bowel sounds are normal. There is no distension. Palpations: Abdomen is soft. Tenderness: There is no abdominal tenderness. There is no guarding or rebound. Musculoskeletal:         General: No deformity. Normal range of motion. Cervical back: Normal range of motion and neck supple. Lymphadenopathy:      Cervical: No cervical adenopathy. Skin:     General: Skin is warm. Findings: No erythema or rash. Neurological:      Mental Status: She is alert and oriented to person, place, and time. Cranial Nerves: No cranial nerve deficit. Sensory: No sensory deficit. Labs: I have personally reviewed pertinent lab results. Lab Results   Component Value Date    WBC 8.36 06/05/2023    HGB 10.7 (L) 06/05/2023    HCT 35.4 06/05/2023    MCV 82 06/05/2023     06/05/2023     Lab Results   Component Value Date    CALCIUM 8.5 06/05/2023    K 3.2 (L) 06/05/2023    CO2 23 06/05/2023     06/05/2023    BUN 10 06/05/2023    CREATININE 0.67 06/05/2023     No results found for: "IGE"  Lab Results   Component Value Date    ALT 8 06/05/2023    AST 10 (L) 06/05/2023    ALKPHOS 74 06/05/2023         Imaging and other studies: I have personally reviewed pertinent reports. and I have personally reviewed pertinent films in PACS  CTA chest pe study on 6/2023- minimal RLL GGO    Pulmonary function testing: none on file    EKG, Pathology, and Other Studies: I have personally reviewed pertinent reports.    and I have personally reviewed pertinent films in PACS     Answers for HPI/ROS submitted by the patient on 7/28/2023  Do you have chest tightness?: Yes  Do you have difficulty breathing?: Yes  Chronicity: chronic  When did you first notice your symptoms?: more than 1 month ago  How often do your symptoms occur?: daily  Since you first noticed this problem, how has it changed?: waxing and waning  Do you have shortness of breath that occurs with effort or exertion?: Yes  Do you have ear congestion?: No  Do you have heartburn?: No  Do you have fatigue?: Yes  Do you have nasal congestion?: Yes  Do you have shortness of breath when lying flat?: Yes  Do you have shortness of breath when you wake up?: Yes  Do you have sweats?: Yes  Have you experienced weight loss?: No  Which of the following makes your symptoms worse?: nothing, change in weather, climbing stairs, emotional stress, exposure to fumes, exposure to smoke, minimal activity, strenuous activity  Which of the following makes your symptoms better?: change in position, OTC inhaler, rest        Tino Diggs MD  Pulmonary and Critical Care   [facility]

## 2023-08-14 ENCOUNTER — HOSPITAL ENCOUNTER (OUTPATIENT)
Dept: RADIOLOGY | Facility: HOSPITAL | Age: 53
Discharge: HOME | End: 2023-08-14
Attending: INTERNAL MEDICINE
Payer: COMMERCIAL

## 2023-08-14 ENCOUNTER — HOSPITAL ENCOUNTER (OUTPATIENT)
Dept: RADIOLOGY | Facility: HOSPITAL | Age: 53
Discharge: HOME | End: 2023-08-14
Payer: COMMERCIAL

## 2023-08-14 ENCOUNTER — HOSPITAL ENCOUNTER (OUTPATIENT)
Dept: PULMONOLOGY | Facility: HOSPITAL | Age: 53
Discharge: HOME | End: 2023-08-14
Attending: INTERNAL MEDICINE
Payer: COMMERCIAL

## 2023-08-14 DIAGNOSIS — Z86.711 HISTORY OF PULMONARY EMBOLISM: ICD-10-CM

## 2023-08-14 DIAGNOSIS — R06.02 CHRONIC SHORTNESS OF BREATH: ICD-10-CM

## 2023-08-14 DIAGNOSIS — I26.99 PULMONARY EMBOLI (HCC): Primary | ICD-10-CM

## 2023-08-14 DIAGNOSIS — R06.02 SHORTNESS OF BREATH: ICD-10-CM

## 2023-08-14 DIAGNOSIS — I26.99 PULMONARY EMBOLI (HCC): ICD-10-CM

## 2023-08-14 PROCEDURE — A9540 TC99M MAA: HCPCS

## 2023-08-14 PROCEDURE — 71046 X-RAY EXAM CHEST 2 VIEWS: CPT

## 2023-08-14 PROCEDURE — 94726 PLETHYSMOGRAPHY LUNG VOLUMES: CPT

## 2023-08-14 PROCEDURE — A9558 XE133 XENON 10MCI: HCPCS

## 2023-08-14 PROCEDURE — 94726 PLETHYSMOGRAPHY LUNG VOLUMES: CPT | Performed by: STUDENT IN AN ORGANIZED HEALTH CARE EDUCATION/TRAINING PROGRAM

## 2023-08-14 PROCEDURE — 94729 DIFFUSING CAPACITY: CPT | Performed by: STUDENT IN AN ORGANIZED HEALTH CARE EDUCATION/TRAINING PROGRAM

## 2023-08-14 PROCEDURE — 94729 DIFFUSING CAPACITY: CPT

## 2023-08-14 PROCEDURE — G1004 CDSM NDSC: HCPCS

## 2023-08-14 PROCEDURE — 78582 LUNG VENTILAT&PERFUS IMAGING: CPT

## 2023-08-14 PROCEDURE — 94060 EVALUATION OF WHEEZING: CPT

## 2023-08-14 PROCEDURE — 94760 N-INVAS EAR/PLS OXIMETRY 1: CPT

## 2023-08-14 PROCEDURE — 94060 EVALUATION OF WHEEZING: CPT | Performed by: STUDENT IN AN ORGANIZED HEALTH CARE EDUCATION/TRAINING PROGRAM

## 2023-08-14 RX ORDER — ALBUTEROL SULFATE 2.5 MG/3ML
2.5 SOLUTION RESPIRATORY (INHALATION) ONCE
Status: COMPLETED | OUTPATIENT
Start: 2023-08-14 | End: 2023-08-14

## 2023-08-14 RX ADMIN — ALBUTEROL SULFATE 2.5 MG: 2.5 SOLUTION RESPIRATORY (INHALATION) at 07:46

## 2023-08-15 ENCOUNTER — OFFICE VISIT (OUTPATIENT)
Dept: FAMILY MEDICINE CLINIC | Facility: CLINIC | Age: 53
End: 2023-08-15
Payer: COMMERCIAL

## 2023-08-15 VITALS
OXYGEN SATURATION: 99 % | WEIGHT: 210 LBS | BODY MASS INDEX: 33.75 KG/M2 | TEMPERATURE: 97.3 F | HEART RATE: 74 BPM | HEIGHT: 66 IN | SYSTOLIC BLOOD PRESSURE: 122 MMHG | DIASTOLIC BLOOD PRESSURE: 74 MMHG

## 2023-08-15 DIAGNOSIS — F41.9 ANXIETY: ICD-10-CM

## 2023-08-15 DIAGNOSIS — E78.2 MIXED HYPERLIPIDEMIA: ICD-10-CM

## 2023-08-15 DIAGNOSIS — R94.6 BORDERLINE ABNORMAL THYROID FUNCTION TEST: Primary | ICD-10-CM

## 2023-08-15 PROBLEM — E66.811 CLASS 1 OBESITY DUE TO EXCESS CALORIES WITHOUT SERIOUS COMORBIDITY WITH BODY MASS INDEX (BMI) OF 33.0 TO 33.9 IN ADULT: Status: ACTIVE | Noted: 2023-08-15

## 2023-08-15 PROBLEM — I82.4Z1 DVT, LOWER EXTREMITY, DISTAL, ACUTE, RIGHT (HCC): Status: RESOLVED | Noted: 2022-09-27 | Resolved: 2023-08-15

## 2023-08-15 PROBLEM — E66.09 CLASS 1 OBESITY DUE TO EXCESS CALORIES WITHOUT SERIOUS COMORBIDITY WITH BODY MASS INDEX (BMI) OF 33.0 TO 33.9 IN ADULT: Status: ACTIVE | Noted: 2023-08-15

## 2023-08-15 PROCEDURE — 99214 OFFICE O/P EST MOD 30 MIN: CPT | Performed by: FAMILY MEDICINE

## 2023-08-15 RX ORDER — LEVOTHYROXINE SODIUM 0.03 MG/1
25 TABLET ORAL
Qty: 90 TABLET | Refills: 1 | Status: SHIPPED | OUTPATIENT
Start: 2023-08-15

## 2023-08-15 NOTE — PROGRESS NOTES
[facility]        NAME: Weston Blanco is a 48 y.o. female  : 1970    MRN: [id number]  DATE: August 15, 2023  TIME: 3:40 PM    Assessment and Plan   Borderline abnormal thyroid function test [R94.6]  1. Borderline abnormal thyroid function test  levothyroxine (Euthyrox) 25 mcg tablet    TSH, 3rd generation    T4, free    TSH, 3rd generation    T4, free      2. Anxiety        3. Mixed hyperlipidemia  Comprehensive metabolic panel    Lipid Panel with Direct LDL reflex    Comprehensive metabolic panel    Lipid Panel with Direct LDL reflex          Pulmonary emboli [facility])  Recent studies recurrence          Patient Instructions     Patient Instructions   Start low-dose levothyroxine 25 mcg daily. Repeat labs as ordered in approximately 1 month. Then follow-up here for your annual physical with Dr. Roddy Eric. Chief Complaint     Chief Complaint   Patient presents with   • Follow-up         History of Present Illness       Patient comes in today for follow-up. Has some borderline thyroid studies to gynecologist with a TSH of 4.89. T4 was 1. Anxiety-stable with current dose of Xanax. Hyperlipidemia-no current medications. Review of Systems   Review of Systems   Constitutional: Positive for fatigue and unexpected weight change. Negative for activity change, appetite change and diaphoresis. HENT: Negative for congestion, sinus pressure and sore throat. Respiratory: Negative for cough, chest tightness, shortness of breath and wheezing. Cardiovascular: Negative for chest pain, palpitations and leg swelling. Fast or slow heart rate   Gastrointestinal: Negative for abdominal pain, blood in stool, constipation, diarrhea, nausea and vomiting. Genitourinary: Negative for difficulty urinating, dysuria, frequency and hematuria. Musculoskeletal: Negative for arthralgias, gait problem, joint swelling and myalgias.    Neurological: Negative for dizziness, light-headedness and headaches. Psychiatric/Behavioral: Negative for agitation, confusion, dysphoric mood and sleep disturbance. The patient is nervous/anxious.           Current Medications       Current Outpatient Medications:   •  Acetaminophen 500 MG, Take 2 capsules (1,000 mg total) by mouth every 6 (six) hours as needed for mild pain, Disp: 100 capsule, Rfl: 10  •  albuterol (Proventil HFA) 90 mcg/act inhaler, Inhale 2 puffs every 6 (six) hours as needed for wheezing or shortness of breath, Disp: 6.7 g, Rfl: 5  •  ALPRAZolam (XANAX) 0.5 mg tablet, Take 1 tablet (0.5 mg total) by mouth 4 (four) times a day as needed for anxiety, Disp: 120 tablet, Rfl: 5  •  aspirin (ECOTRIN LOW STRENGTH) 81 mg EC tablet, Take 1 tablet (81 mg total) by mouth in the morning, Disp: , Rfl:   •  benzonatate (TESSALON) 200 MG capsule, Take 1 capsule (200 mg total) by mouth 3 (three) times a day as needed for cough, Disp: 20 capsule, Rfl: 0  •  dicyclomine (BENTYL) 20 mg tablet, Take 1 tablet (20 mg total) by mouth 3 (three) times a day as needed (bowel cramps), Disp: 60 tablet, Rfl: 0  •  fluticasone (FLONASE) 50 mcg/act nasal spray, 1 spray into each nostril daily, Disp: 11.1 mL, Rfl: 1  •  levothyroxine (Euthyrox) 25 mcg tablet, Take 1 tablet (25 mcg total) by mouth daily in the early morning, Disp: 90 tablet, Rfl: 1  •  RA Acetaminophen Ex St 500 MG tablet, Take 1,000 mg by mouth every 6 (six) hours as needed, Disp: , Rfl:     Current Allergies     Allergies as of 08/15/2023 - Reviewed 08/15/2023   Allergen Reaction Noted   • Iodine i 131 tositumomab Rash 11/11/2021   • Doxycycline GI Intolerance and Vomiting 01/17/2017   • Latex  05/09/2016   • Other Hives 07/28/2021            The following portions of the patient's history were reviewed and updated as appropriate: allergies, current medications, past family history, past medical history, past social history, past surgical history and problem list.     Past Medical History: Diagnosis Date   • Allergic     as long as i can remember dust pollen etc   • Anemia     i can tend to be anemic i control through diet & supplementn   • Anxiety     as long as i can remember   • DVT, lower extremity, distal, acute, right (HCC) 2022   • GERD (gastroesophageal reflux disease)     i have it on occasion   • GERD (gastroesophageal reflux disease)     i have it on occasion   • Headache(784.0)     i get tension headaches alot       Past Surgical History:   Procedure Laterality Date   •  SECTION  2005    2nd son's birth   • TUBAL LIGATION      after second son was born in 48       Family History   Problem Relation Age of Onset   • Hypertension Father    • Breast cancer Paternal Grandmother    • Diabetes Paternal Grandmother    • Substance Abuse Neg Hx    • Mental illness Neg Hx          Medications have been verified. Objective   /74 (BP Location: Left arm, Patient Position: Sitting, Cuff Size: Standard)   Pulse 74   Temp (!) 97.3 °F (36.3 °C) (Tympanic)   Ht 5' 6" (1.676 m)   Wt 95.3 kg (210 lb)   SpO2 99%   BMI 33.89 kg/m²        Physical Exam     Physical Exam  Vitals reviewed. Constitutional:       General: She is not in acute distress. Appearance: She is well-developed. She is not diaphoretic. HENT:      Head: Normocephalic and atraumatic. Right Ear: Tympanic membrane, ear canal and external ear normal.      Left Ear: Tympanic membrane, ear canal and external ear normal.      Nose: Nose normal. No mucosal edema or rhinorrhea. Right Sinus: No maxillary sinus tenderness. Left Sinus: No maxillary sinus tenderness. Mouth/Throat:      Mouth: Mucous membranes are not pale and not dry. Dentition: Normal dentition. Pharynx: Uvula midline. No oropharyngeal exudate. Eyes:      General:         Right eye: No discharge. Left eye: No discharge. Extraocular Movements: Extraocular movements intact.       Conjunctiva/sclera: Conjunctivae normal.      Pupils: Pupils are equal, round, and reactive to light. Neck:      Thyroid: No thyroid mass, thyromegaly or thyroid tenderness. Cardiovascular:      Rate and Rhythm: Normal rate and regular rhythm. Heart sounds: Normal heart sounds. No murmur heard. Pulmonary:      Effort: Pulmonary effort is normal. No respiratory distress. Breath sounds: Normal breath sounds. No wheezing or rales. Musculoskeletal:      Cervical back: Normal range of motion and neck supple. Right lower leg: No edema. Left lower leg: No edema. Lymphadenopathy:      Cervical: No cervical adenopathy. Skin:     Findings: No rash. Neurological:      Mental Status: She is alert and oriented to person, place, and time. Cranial Nerves: No cranial nerve deficit.    Psychiatric:         Mood and Affect: Mood normal.         Behavior: Behavior normal.

## 2023-08-15 NOTE — PATIENT INSTRUCTIONS
Start low-dose levothyroxine 25 mcg daily. Repeat labs as ordered in approximately 1 month. Then follow-up here for your annual physical with Dr. Yony Haider.

## 2023-08-21 DIAGNOSIS — R07.9 CHEST PAIN AT REST: Primary | ICD-10-CM

## 2023-08-21 NOTE — PROGRESS NOTES
Discussed with patient, she is still having intermittent chest pain and is unsure if this is related to anxiety. I recommended eval by cardiology to be complete.  She is agreeable, order placed for referral.

## 2023-08-25 ENCOUNTER — TELEPHONE (OUTPATIENT)
Dept: HEMATOLOGY ONCOLOGY | Facility: CLINIC | Age: 53
End: 2023-08-25

## 2023-08-25 NOTE — TELEPHONE ENCOUNTER
Patient Decline Virtual Visit. Phone call completed. She started 81 mg of aspirin daily at her visit last month. She has noted some mild improvement in the leg and it is not as painful and she is able to ambulate more regularly. She also was evaluated and found to have hypothyroidism and started on levothyroxine. Since starting this her energy level is much better and she is hoping that this will help with her weight loss. She is scheduled to see vascular surgery on 10/20/2023 to rediscuss intervention on her varicose veins. For now she will stay on 81 mg of aspirin as she is not having any excess bleeding or bruising. She will see me back in follow-up on an as-needed basis in the future, and continue to follow with her other specialist and PCP.

## 2023-09-07 ENCOUNTER — TELEPHONE (OUTPATIENT)
Dept: FAMILY MEDICINE CLINIC | Facility: CLINIC | Age: 53
End: 2023-09-07

## 2023-09-07 NOTE — TELEPHONE ENCOUNTER
Pt mentioned that she has been having stomach issues started on Sunday night into Monday that was the worst. Pt is taking Levothyroxine and was not sure if it was a side effect or not. Stomach pain is .

## 2023-09-08 NOTE — TELEPHONE ENCOUNTER
Pt is aware that the medication might not be causing the pain that she can hold the medication if she wanted for 2-3 days to see how she is.

## 2023-09-15 LAB
ALBUMIN SERPL-MCNC: 3.7 G/DL (ref 3.6–5.1)
ALBUMIN/GLOB SERPL: 1.4 (CALC) (ref 1–2.5)
ALP SERPL-CCNC: 82 U/L (ref 37–153)
ALT SERPL-CCNC: 10 U/L (ref 6–29)
AST SERPL-CCNC: 12 U/L (ref 10–35)
BILIRUB SERPL-MCNC: 0.4 MG/DL (ref 0.2–1.2)
BUN SERPL-MCNC: 13 MG/DL (ref 7–25)
BUN/CREAT SERPL: ABNORMAL (CALC) (ref 6–22)
CALCIUM SERPL-MCNC: 8.3 MG/DL (ref 8.6–10.4)
CHLORIDE SERPL-SCNC: 107 MMOL/L (ref 98–110)
CHOLEST SERPL-MCNC: 159 MG/DL
CHOLEST/HDLC SERPL: 3.5 (CALC)
CO2 SERPL-SCNC: 26 MMOL/L (ref 20–32)
CREAT SERPL-MCNC: 0.79 MG/DL (ref 0.5–1.03)
GFR/BSA.PRED SERPLBLD CYS-BASED-ARV: 91 ML/MIN/1.73M2
GLOBULIN SER CALC-MCNC: 2.6 G/DL (CALC) (ref 1.9–3.7)
GLUCOSE SERPL-MCNC: 85 MG/DL (ref 65–99)
HDLC SERPL-MCNC: 45 MG/DL
LDLC SERPL CALC-MCNC: 94 MG/DL (CALC)
NONHDLC SERPL-MCNC: 114 MG/DL (CALC)
POTASSIUM SERPL-SCNC: 4.1 MMOL/L (ref 3.5–5.3)
PROT SERPL-MCNC: 6.3 G/DL (ref 6.1–8.1)
SODIUM SERPL-SCNC: 139 MMOL/L (ref 135–146)
T4 FREE SERPL-MCNC: 1.1 NG/DL (ref 0.8–1.8)
TRIGL SERPL-MCNC: 108 MG/DL
TSH SERPL-ACNC: 2.79 MIU/L

## 2023-09-20 ENCOUNTER — OFFICE VISIT (OUTPATIENT)
Dept: FAMILY MEDICINE CLINIC | Facility: CLINIC | Age: 53
End: 2023-09-20
Payer: COMMERCIAL

## 2023-09-20 VITALS
DIASTOLIC BLOOD PRESSURE: 70 MMHG | WEIGHT: 200 LBS | TEMPERATURE: 98 F | HEIGHT: 66 IN | SYSTOLIC BLOOD PRESSURE: 100 MMHG | BODY MASS INDEX: 32.14 KG/M2

## 2023-09-20 DIAGNOSIS — Z00.00 WELLNESS EXAMINATION: ICD-10-CM

## 2023-09-20 DIAGNOSIS — R94.6 BORDERLINE ABNORMAL THYROID FUNCTION TEST: ICD-10-CM

## 2023-09-20 DIAGNOSIS — F41.9 ANXIETY: ICD-10-CM

## 2023-09-20 DIAGNOSIS — E78.2 MIXED HYPERLIPIDEMIA: Primary | ICD-10-CM

## 2023-09-20 DIAGNOSIS — E03.9 HYPOTHYROIDISM, UNSPECIFIED TYPE: ICD-10-CM

## 2023-09-20 DIAGNOSIS — R10.32 LEFT LOWER QUADRANT ABDOMINAL PAIN: ICD-10-CM

## 2023-09-20 PROCEDURE — 99214 OFFICE O/P EST MOD 30 MIN: CPT | Performed by: FAMILY MEDICINE

## 2023-09-20 PROCEDURE — 99396 PREV VISIT EST AGE 40-64: CPT | Performed by: FAMILY MEDICINE

## 2023-09-20 RX ORDER — DICYCLOMINE HCL 20 MG
20 TABLET ORAL EVERY 6 HOURS
Qty: 60 TABLET | Refills: 2 | Status: SHIPPED | OUTPATIENT
Start: 2023-09-20

## 2023-09-20 RX ORDER — LEVOTHYROXINE SODIUM 0.05 MG/1
25 TABLET ORAL
Qty: 90 TABLET | Refills: 3 | Status: SHIPPED | OUTPATIENT
Start: 2023-09-20 | End: 2023-09-21 | Stop reason: SDUPTHER

## 2023-09-20 RX ORDER — ALPRAZOLAM 0.5 MG/1
0.5 TABLET ORAL 4 TIMES DAILY PRN
Qty: 120 TABLET | Refills: 5 | Status: SHIPPED | OUTPATIENT
Start: 2023-09-20

## 2023-09-20 NOTE — PROGRESS NOTES
HPI:  Hammad Pollock is a 48 y.o. female here for her yearly health maintenance exam.   Patient Active Problem List   Diagnosis   • Anxiety   • Keratosis pilaris   • Primary insomnia   • Varicose veins of leg with pain, right   • 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   • Pulmonary emboli (HCC)   • Migraines   • Borderline abnormal thyroid function test   • Mixed hyperlipidemia   • Class 1 obesity due to excess calories without serious comorbidity with body mass index (BMI) of 33.0 to 33.9 in adult   • Hypothyroidism     Past Medical History:   Diagnosis Date   • Allergic     as long as i can remember dust pollen etc   • Anemia     i can tend to be anemic i control through diet & supplementn   • Anxiety     as long as i can remember   • DVT, lower extremity, distal, acute, right ([address]) 9/27/2022   • GERD (gastroesophageal reflux disease)     i have it on occasion   • GERD (gastroesophageal reflux disease)     i have it on occasion   • Headache(784.0)     i get tension headaches alot       1. Advanced Directive: n     2. Durable Power of  for Healthcare: n     3. Social History:           Drug and alcohol History: n                  4. Immunizations up to date: y                 Lifestyle:                           Healthy Diet:y                          Alcohol Use:n                          Tobacco Use:n                          Regular exercise:y                          Weight concerns:y                               5.  Over the past 2 weeks, how often have you been bothered by the following:              Little interest or pleasure in doing things:n              Felling down, depressed or hopeless:n       Current Outpatient Medications   Medication Sig Dispense Refill   • Acetaminophen 500 MG Take 2 capsules (1,000 mg total) by mouth every 6 (six) hours as needed for mild pain 100 capsule 10   • albuterol (Proventil HFA) 90 mcg/act inhaler Inhale 2 puffs every 6 (six) hours as needed for wheezing or shortness of breath 6.7 g 5   • ALPRAZolam (XANAX) 0.5 mg tablet Take 1 tablet (0.5 mg total) by mouth 4 (four) times a day as needed for anxiety 120 tablet 5   • aspirin (ECOTRIN LOW STRENGTH) 81 mg EC tablet Take 1 tablet (81 mg total) by mouth in the morning     • benzonatate (TESSALON) 200 MG capsule Take 1 capsule (200 mg total) by mouth 3 (three) times a day as needed for cough 20 capsule 0   • dicyclomine (BENTYL) 20 mg tablet Take 1 tablet (20 mg total) by mouth 3 (three) times a day as needed (bowel cramps) 60 tablet 0   • fluticasone (FLONASE) 50 mcg/act nasal spray 1 spray into each nostril daily 11.1 mL 1   • levothyroxine (Euthyrox) 50 mcg tablet Take 0.5 tablets (25 mcg total) by mouth daily in the early morning 90 tablet 3   • RA Acetaminophen Ex St 500 MG tablet Take 1,000 mg by mouth every 6 (six) hours as needed       No current facility-administered medications for this visit. Allergies   Allergen Reactions   • Iodine I 131 Tositumomab Rash     OK for CT per Dr Tovar Challenger   • Doxycycline GI Intolerance and Vomiting   • Latex    • Other Hives     Immunization History   Administered Date(s) Administered   • Influenza Quadrivalent Preservative Free 3 years and older IM 01/17/2017   • Tdap 01/12/2015       Patient Care Team:  Kevin Montoya MD as PCP - General    Review of Systems   Constitutional: Negative for fatigue, fever and unexpected weight change. HENT: Negative for congestion, sinus pain and sore throat. Eyes: Negative for visual disturbance. Respiratory: Negative for shortness of breath and wheezing. Cardiovascular: Negative for chest pain and palpitations. Gastrointestinal: Negative for abdominal pain, nausea and vomiting. Musculoskeletal: Negative. Negative for arthralgias and myalgias. Neurological: Negative for syncope, weakness and numbness. Psychiatric/Behavioral: Negative. Negative for confusion, dysphoric mood and suicidal ideas. Physical Exam :  Physical Exam  Constitutional:       Appearance: She is well-developed. HENT:      Right Ear: Ear canal normal. Tympanic membrane is not injected. Left Ear: Ear canal normal. Tympanic membrane is not injected. Nose: Nose normal.   Eyes:      General:         Right eye: No discharge. Left eye: No discharge. Conjunctiva/sclera: Conjunctivae normal.      Pupils: Pupils are equal, round, and reactive to light. Neck:      Thyroid: No thyromegaly. Cardiovascular:      Rate and Rhythm: Normal rate and regular rhythm. Heart sounds: Normal heart sounds. No murmur heard. Pulmonary:      Effort: Pulmonary effort is normal. No respiratory distress. Breath sounds: Normal breath sounds. No wheezing. Abdominal:      General: Bowel sounds are normal. There is no distension. Palpations: Abdomen is soft. Tenderness: There is no abdominal tenderness. Musculoskeletal:         General: Normal range of motion. Cervical back: Normal range of motion and neck supple. Lymphadenopathy:      Cervical: No cervical adenopathy. Skin:     General: Skin is warm and dry. Neurological:      Mental Status: She is alert and oriented to person, place, and time. She is not disoriented. Sensory: No sensory deficit. Gait: Gait normal.      Deep Tendon Reflexes: Reflexes are normal and symmetric. Psychiatric:         Speech: Speech normal.         Behavior: Behavior normal.         Thought Content: Thought content normal.         Judgment: Judgment normal.           Assessment and Plan:  1. Mixed hyperlipidemia        2. Anxiety  ALPRAZolam (XANAX) 0.5 mg tablet      3. Hypothyroidism, unspecified type  T4, free    TSH, 3rd generation    T4, free    TSH, 3rd generation      4. Borderline abnormal thyroid function test  levothyroxine (Euthyrox) 50 mcg tablet      5.  Wellness examination            Health Maintenance Due   Topic Date Due   • Hepatitis C Screening  Never done   • COVID-19 Vaccine (1) Never done   • HIV Screening  Never done   • Hepatitis A Vaccine (1 of 2 - Risk 2-dose series) Never done   • BMI: Followup Plan  02/24/2021   • Influenza Vaccine (1) 09/01/2023

## 2023-09-20 NOTE — PROGRESS NOTES
Assessment/Plan:    No problem-specific Assessment & Plan notes found for this encounter. Diagnoses and all orders for this visit:    Mixed hyperlipidemia    Anxiety  -     ALPRAZolam (XANAX) 0.5 mg tablet; Take 1 tablet (0.5 mg total) by mouth 4 (four) times a day as needed for anxiety    Hypothyroidism, unspecified type  -     T4, free; Future  -     TSH, 3rd generation; Future  -     T4, free  -     TSH, 3rd generation    Borderline abnormal thyroid function test  -     levothyroxine (Euthyrox) 50 mcg tablet; Take 0.5 tablets (25 mcg total) by mouth daily in the early morning    Wellness examination          Subjective:   Chief Complaint   Patient presents with   • Physical Exam        Patient ID: Demario Herzog is a 48 y.o. female. HPI    The following portions of the patient's history were reviewed and updated as appropriate: allergies, current medications, past family history, past medical history, past social history, past surgical history and problem list.    Review of Systems   Constitutional: Negative for chills and fever. HENT: Negative for congestion, facial swelling, sinus pressure and sore throat. Eyes: Negative for visual disturbance. Respiratory: Negative for shortness of breath and wheezing. Cardiovascular: Negative for chest pain and palpitations. Gastrointestinal: Negative for abdominal pain. Musculoskeletal: Negative for myalgias. Skin: Negative for rash. Neurological: Negative for syncope, weakness and numbness. Psychiatric/Behavioral: Negative for confusion and hallucinations. Objective:  Vitals:    09/20/23 1346   BP: 100/70   BP Location: Right arm   Patient Position: Sitting   Cuff Size: Standard   Temp: 98 °F (36.7 °C)   TempSrc: Tympanic   Weight: 90.7 kg (200 lb)   Height: 5' 6" (1.676 m)      Physical Exam  Constitutional:       General: She is not in acute distress. Appearance: She is well-developed. She is not diaphoretic.    HENT:      Right Ear: Tympanic membrane, ear canal and external ear normal. Tympanic membrane is not injected. Left Ear: Tympanic membrane, ear canal and external ear normal. Tympanic membrane is not injected. Nose: Nose normal.   Eyes:      General: Lids are normal.      Conjunctiva/sclera: Conjunctivae normal.      Pupils: Pupils are equal, round, and reactive to light. Neck:      Thyroid: No thyromegaly. Cardiovascular:      Rate and Rhythm: Normal rate and regular rhythm. Heart sounds: Normal heart sounds. No murmur heard. Pulmonary:      Effort: Pulmonary effort is normal. No respiratory distress. Breath sounds: Normal breath sounds. No wheezing. Abdominal:      General: Bowel sounds are normal.      Palpations: Abdomen is soft. There is no hepatomegaly or splenomegaly. Tenderness: There is no abdominal tenderness. Musculoskeletal:         General: No tenderness or deformity. Normal range of motion. Cervical back: Normal range of motion and neck supple. Skin:     General: Skin is warm and dry. Coloration: Skin is not pale. Findings: No rash. Neurological:      Mental Status: She is alert and oriented to person, place, and time. She is not disoriented. Sensory: No sensory deficit. Gait: Gait normal.      Deep Tendon Reflexes: Reflexes are normal and symmetric.    Psychiatric:         Speech: Speech normal.         Behavior: Behavior normal.

## 2023-09-21 DIAGNOSIS — R94.6 BORDERLINE ABNORMAL THYROID FUNCTION TEST: ICD-10-CM

## 2023-09-22 RX ORDER — LEVOTHYROXINE SODIUM 0.05 MG/1
50 TABLET ORAL
Qty: 90 TABLET | Refills: 0 | Status: SHIPPED | OUTPATIENT
Start: 2023-09-22

## 2023-09-27 ENCOUNTER — TELEPHONE (OUTPATIENT)
Dept: GASTROENTEROLOGY | Facility: CLINIC | Age: 53
End: 2023-09-27

## 2023-09-27 ENCOUNTER — OFFICE VISIT (OUTPATIENT)
Dept: GASTROENTEROLOGY | Facility: CLINIC | Age: 53
End: 2023-09-27
Payer: COMMERCIAL

## 2023-09-27 ENCOUNTER — CONSULT (OUTPATIENT)
Dept: CARDIOLOGY CLINIC | Facility: CLINIC | Age: 53
End: 2023-09-27
Payer: COMMERCIAL

## 2023-09-27 VITALS
SYSTOLIC BLOOD PRESSURE: 102 MMHG | DIASTOLIC BLOOD PRESSURE: 62 MMHG | HEIGHT: 66 IN | BODY MASS INDEX: 32.62 KG/M2 | HEART RATE: 68 BPM | WEIGHT: 203 LBS

## 2023-09-27 VITALS
HEIGHT: 66 IN | SYSTOLIC BLOOD PRESSURE: 108 MMHG | WEIGHT: 203 LBS | HEART RATE: 80 BPM | DIASTOLIC BLOOD PRESSURE: 68 MMHG | BODY MASS INDEX: 32.62 KG/M2 | OXYGEN SATURATION: 99 %

## 2023-09-27 DIAGNOSIS — K44.9 HIATAL HERNIA: ICD-10-CM

## 2023-09-27 DIAGNOSIS — R10.32 LEFT LOWER QUADRANT ABDOMINAL PAIN: ICD-10-CM

## 2023-09-27 DIAGNOSIS — K21.9 GASTROESOPHAGEAL REFLUX DISEASE WITHOUT ESOPHAGITIS: Primary | ICD-10-CM

## 2023-09-27 DIAGNOSIS — R10.13 DYSPEPSIA: ICD-10-CM

## 2023-09-27 DIAGNOSIS — R07.9 CHEST PAIN SYNDROME: Primary | ICD-10-CM

## 2023-09-27 DIAGNOSIS — K59.00 CONSTIPATION, UNSPECIFIED CONSTIPATION TYPE: ICD-10-CM

## 2023-09-27 DIAGNOSIS — R94.31 ABNORMAL EKG: ICD-10-CM

## 2023-09-27 PROCEDURE — 99203 OFFICE O/P NEW LOW 30 MIN: CPT | Performed by: INTERNAL MEDICINE

## 2023-09-27 PROCEDURE — 99214 OFFICE O/P EST MOD 30 MIN: CPT | Performed by: INTERNAL MEDICINE

## 2023-09-27 RX ORDER — CLOBETASOL PROPIONATE 0.5 MG/G
OINTMENT TOPICAL
COMMUNITY
Start: 2023-08-09

## 2023-09-27 RX ORDER — POLYETHYLENE GLYCOL-3350 AND ELECTROLYTES 236; 6.74; 5.86; 2.97; 22.74 G/274.31G; G/274.31G; G/274.31G; G/274.31G; G/274.31G
4 POWDER, FOR SOLUTION ORAL ONCE
Qty: 4000 ML | Refills: 0 | Status: SHIPPED | OUTPATIENT
Start: 2023-09-27 | End: 2023-09-27

## 2023-09-27 NOTE — PROGRESS NOTES
Cardiology Follow Up    Freya Salas  1970  [id number]  [id number] [facility] CATH LAB  [address]  [address] [facility]  [phone]    1. Chest pain syndrome        2. Abnormal EKG            Interval History: Cardiology consultation. 80-year-old female who has no previous cardiac history. Patient comes for evaluation of of chest discomfort, she describes her symptoms as midsternal chest pressure-like discomfort. Sometimes radiating to the back. No particular triggering factors and variable duration. Additionally she also complains of dyspnea, sometimes with minimal efforts. Or inability to take deep breaths., there are no particular triggering factors. The patient does have history of pulmonary embolism back in  after a long car ride. Hypercoagulable work-up at that time was unrevealing. She is no longer on anticoagulation, she is on aspirin therapy. Symptoms appear to be started after these clinical event. Lipids this year total cholesterol 159, HDL 45, LDL 94.  CT of the chest at the time of her pulmonary embolism described heart normal for age, I am not certain as to what that means, personally reviewed, there is very mild calcification noted in the RCA territory. She did have an EKG that revealed normal sinus rhythm with poor R wave progression across the precordium. Patient did have an echocardiogram back in  that was completely normal.  Patient is non-smoker she does have family history of premature coronary disease her father had multiple problems including COPD and bypass surgery. .  No clear-cut details. Possibly lung cancer. Patient is active, no regular exercise. Patient is . No cardiac issues during pregnancy.     Patient Active Problem List   Diagnosis   • Anxiety   • Keratosis pilaris   • Primary insomnia   • Varicose veins of leg with pain, right   • 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   • Pulmonary emboli (HCC)   • Migraines   • Borderline abnormal thyroid function test   • Mixed hyperlipidemia   • Class 1 obesity due to excess calories without serious comorbidity with body mass index (BMI) of 33.0 to 33.9 in adult   • Hypothyroidism   • Left lower quadrant abdominal pain   • Chest pain syndrome   • Abnormal EKG     Past Medical History:   Diagnosis Date   • Allergic     as long as i can remember dust pollen etc   • Anemia     i can tend to be anemic i control through diet & supplementn   • Anxiety     as long as i can remember   • DVT, lower extremity, distal, acute, right ([address]) 2022   • GERD (gastroesophageal reflux disease)     i have it on occasion   • GERD (gastroesophageal reflux disease)     i have it on occasion   • Headache(784.0)     i get tension headaches alot     Social History     Socioeconomic History   • Marital status: /Civil Union     Spouse name: Not on file   • Number of children: Not on file   • Years of education: Not on file   • Highest education level: Not on file   Occupational History   • Not on file   Tobacco Use   • Smoking status: Former     Packs/day: 0.25     Types: Cigarettes, E-Cigarettes     Start date: 48     Quit date: 2022     Years since quittin.0   • Smokeless tobacco: Never   • Tobacco comments:     havent smoked in a long time   Vaping Use   • Vaping Use: Former   • Substances: Flavoring   Substance and Sexual Activity   • Alcohol use: Not Currently   • Drug use: Never   • Sexual activity: Yes     Partners: Male     Birth control/protection: Surgical     Comment: Tubal ligation   Other Topics Concern   • Not on file   Social History Narrative   • Not on file     Social Determinants of Health     Financial Resource Strain: Not on file   Food Insecurity: Not on file   Transportation Needs: Not on file   Physical Activity: Not on file   Stress: Not on file   Social Connections: Not on file   Intimate Partner Violence: Not on file   Housing Stability: Not on file      Family History   Problem Relation Age of Onset   • Hypertension Father    • Breast cancer Paternal Grandmother    • Diabetes Paternal Grandmother    • Substance Abuse Neg Hx    • Mental illness Neg Hx      Past Surgical History:   Procedure Laterality Date   •  SECTION  2005    2nd son's birth   • TUBAL LIGATION      after second son was born in        Current Outpatient Medications:   •  Acetaminophen 500 MG, Take 2 capsules (1,000 mg total) by mouth every 6 (six) hours as needed for mild pain, Disp: 100 capsule, Rfl: 10  •  albuterol (Proventil HFA) 90 mcg/act inhaler, Inhale 2 puffs every 6 (six) hours as needed for wheezing or shortness of breath, Disp: 6.7 g, Rfl: 5  •  ALPRAZolam (XANAX) 0.5 mg tablet, Take 1 tablet (0.5 mg total) by mouth 4 (four) times a day as needed for anxiety, Disp: 120 tablet, Rfl: 5  •  aspirin (ECOTRIN LOW STRENGTH) 81 mg EC tablet, Take 1 tablet (81 mg total) by mouth in the morning, Disp: , Rfl:   •  benzonatate (TESSALON) 200 MG capsule, Take 1 capsule (200 mg total) by mouth 3 (three) times a day as needed for cough, Disp: 20 capsule, Rfl: 0  •  clobetasol (TEMOVATE) 0.05 % ointment, 1 APPLICATIOIN TOPICALLY AS DIRECTED. APPLY SPARINGLY 2 TIMES A D. ..  (REFER TO PRESCRIPTION NOTES). , Disp: , Rfl:   •  dicyclomine (BENTYL) 20 mg tablet, Take 1 tablet (20 mg total) by mouth every 6 (six) hours, Disp: 60 tablet, Rfl: 2  •  fluticasone (FLONASE) 50 mcg/act nasal spray, 1 spray into each nostril daily (Patient taking differently: 1 spray into each nostril daily PRN), Disp: 11.1 mL, Rfl: 1  •  levothyroxine (Euthyrox) 50 mcg tablet, Take 1 tablet (50 mcg total) by mouth daily in the early morning, Disp: 90 tablet, Rfl: 0  •  polyethylene glycol (GaviLyte-G) 4000 mL solution, Take 4,000 mL by mouth once for 1 dose, Disp: 4000 mL, Rfl: 0  •  RA Acetaminophen Ex St 500 MG tablet, Take 1,000 mg by mouth every 6 (six) hours as needed, Disp: , Rfl:   Allergies   Allergen Reactions   • Iodine I 131 Tositumomab Rash     OK for CT per Dr Daniel Peabody   • Doxycycline GI Intolerance and Vomiting   • Latex    • Other Hives       Labs:  Office Visit on 08/15/2023   Component Date Value   • Glucose, Random 09/15/2023 85    • BUN 09/15/2023 13    • Creatinine 09/15/2023 0.79    • eGFR 09/15/2023 91    • SL AMB BUN/CREATININE RA* 09/15/2023 SEE NOTE:    • Sodium 09/15/2023 139    • Potassium 09/15/2023 4.1    • Chloride 09/15/2023 107    • CO2 09/15/2023 26    • Calcium 09/15/2023 8.3 (L)    • Protein, Total 09/15/2023 6.3    • Albumin 09/15/2023 3.7    • Globulin 09/15/2023 2.6    • Albumin/Globulin Ratio 09/15/2023 1.4    • TOTAL BILIRUBIN 09/15/2023 0.4    • Alkaline Phosphatase 09/15/2023 82    • AST 09/15/2023 12    • ALT 09/15/2023 10    • Total Cholesterol 09/15/2023 159    • HDL 09/15/2023 45 (L)    • Triglycerides 09/15/2023 108    • LDL Calculated 09/15/2023 94    • Chol HDLC Ratio 09/15/2023 3.5    • Non-HDL Cholesterol 09/15/2023 114    • TSH 09/15/2023 2.79    • Free t4 09/15/2023 1.1    Appointment on 07/12/2023   Component Date Value   • AntiThrombIN III Activity 07/12/2023 94    • ANTICARDIOLIPIN IGG ANTI* 07/12/2023 1.3    • ANTICARDIOLIPIN IGA ANTI* 07/12/2023 1.8    • ANTICARDIOLIPIN IGM ANTI* 07/12/2023 2.9    • Factor V Leiden 07/12/2023 Comment    • REVIEWED BY: 07/12/2023 Comment    • PTT Lupus Anticoagulant 07/12/2023 35.7    • Dilute Viper Venom Time 07/12/2023 33.5    • DILUTE PROTHROMBIN TIME(* 07/12/2023 33.5    • THROMBIN TIME (DRVW) 07/12/2023 16.0    • DPT CONFIRM RATIO 07/12/2023 0.95    • LUPUS REFLEX INTERPRETAT* 07/12/2023 Comment:    • Protein C Activity 07/12/2023 93.0    • PROTEIN S ACTIVITY 07/12/2023 78    • Protein S Ag, Total 07/12/2023 84    • Protein S Ag, Free 07/12/2023 95    • Prothrombin Mutation 07/12/2023 Comment    • REVIEWED BY: 07/12/2023 Cornell Be, PhD    • BETA 2 GLYCO 1 IGG 07/12/2023 1.0    • BETA 2 GLYCO 1 IGA 07/12/2023 1.7    • BETA 2 GLYCO 1 IGM 07/12/2023 <2.4    Admission on 06/05/2023, Discharged on 06/06/2023   Component Date Value   • WBC 06/05/2023 8.36    • RBC 06/05/2023 4.34    • Hemoglobin 06/05/2023 10.7 (L)    • Hematocrit 06/05/2023 35.4    • MCV 06/05/2023 82    • MCH 06/05/2023 24.7 (L)    • MCHC 06/05/2023 30.2 (L)    • RDW 06/05/2023 16.3 (H)    • MPV 06/05/2023 10.1    • Platelets 67/97/2637 328    • nRBC 06/05/2023 0    • Neutrophils Relative 06/05/2023 72    • Immat GRANS % 06/05/2023 0    • Lymphocytes Relative 06/05/2023 18    • Monocytes Relative 06/05/2023 8    • Eosinophils Relative 06/05/2023 1    • Basophils Relative 06/05/2023 1    • Neutrophils Absolute 06/05/2023 6.05    • Immature Grans Absolute 06/05/2023 0.03    • Lymphocytes Absolute 06/05/2023 1.47    • Monocytes Absolute 06/05/2023 0.63    • Eosinophils Absolute 06/05/2023 0.10    • Basophils Absolute 06/05/2023 0.08    • Sodium 06/05/2023 137    • Potassium 06/05/2023 3.2 (L)    • Chloride 06/05/2023 105    • CO2 06/05/2023 23    • ANION GAP 06/05/2023 9    • BUN 06/05/2023 10    • Creatinine 06/05/2023 0.67    • Glucose 06/05/2023 93    • Calcium 06/05/2023 8.5    • AST 06/05/2023 10 (L)    • ALT 06/05/2023 8    • Alkaline Phosphatase 06/05/2023 74    • Total Protein 06/05/2023 6.5    • Albumin 06/05/2023 3.7    • Total Bilirubin 06/05/2023 0.36    • eGFR 06/05/2023 102    • hs TnI 0hr 06/05/2023 <2    • Ventricular Rate 06/05/2023 62    • Atrial Rate 06/05/2023 62    • SD Interval 06/05/2023 176    • QRSD Interval 06/05/2023 78    • QT Interval 06/05/2023 428    • QTC Interval 06/05/2023 434    • P Axis 06/05/2023 22    • QRS Axis 06/05/2023 4    • T Wave Axis 06/05/2023 24      Imaging: No results found. Review of Systems:  Review of Systems   Constitutional: Positive for fatigue. Negative for activity change, diaphoresis and fever.    HENT: Negative for hearing loss, nosebleeds and trouble swallowing. Eyes: Negative for visual disturbance. Respiratory: Negative for apnea, cough, choking, chest tightness, shortness of breath, wheezing and stridor. Cardiovascular: Positive for chest pain and leg swelling. Negative for palpitations. Gastrointestinal: Negative for abdominal pain, anal bleeding, blood in stool, constipation, diarrhea, nausea and vomiting. Endocrine: Negative for cold intolerance and heat intolerance. Genitourinary: Negative for difficulty urinating, dysuria, frequency and hematuria. Musculoskeletal: Negative for arthralgias, back pain, gait problem and myalgias. Skin: Negative for pallor and rash. Allergic/Immunologic: Negative for immunocompromised state. Neurological: Negative for dizziness, tremors, syncope, facial asymmetry, speech difficulty and weakness. Hematological: Bruises/bleeds easily. Psychiatric/Behavioral: Negative for sleep disturbance. The patient is not nervous/anxious. Physical Exam:  Physical Exam  Vitals reviewed. Constitutional:       General: She is not in acute distress. Appearance: Normal appearance. She is normal weight. She is not ill-appearing, toxic-appearing or diaphoretic. HENT:      Head: Normocephalic. Eyes:      General: No scleral icterus. Conjunctiva/sclera: Conjunctivae normal.   Neck:      Vascular: No carotid bruit. Cardiovascular:      Rate and Rhythm: Normal rate and regular rhythm. Pulses: Normal pulses. Heart sounds: Normal heart sounds. No murmur heard. No gallop. Pulmonary:      Effort: Pulmonary effort is normal. No respiratory distress. Breath sounds: Normal breath sounds. No stridor. No wheezing, rhonchi or rales. Chest:      Chest wall: No tenderness. Abdominal:      General: Abdomen is flat. Bowel sounds are normal.      Palpations: Abdomen is soft. Tenderness: There is no abdominal tenderness. Musculoskeletal:      Right lower leg: No edema.    Skin: General: Skin is warm and dry. Capillary Refill: Capillary refill takes less than 2 seconds. Coloration: Skin is not jaundiced or pale. Findings: No bruising or erythema. Neurological:      General: No focal deficit present. Mental Status: She is alert and oriented to person, place, and time. Psychiatric:         Mood and Affect: Mood normal.         Behavior: Behavior normal.         Thought Content: Thought content normal.         Discussion/Summary: Chest pain syndrome. Borderline abnormal EKG, possibly positional.  We will proceed with noninvasive evaluation. Consideration for catheterization with microvascular disease/vsaoactive challenge pending the results of the testing. No medications at the present time. This note was completed in part utilizing [employer] direct voice recognition software. Grammatical errors, random word insertion, spelling mistakes, and incomplete sentences may be an occasional consequence of the system secondary to software limitations, ambient noise and hardware issues. At the time of dictation, efforts were made to edit, clarify and /or correct errors. Please read the chart carefully and recognize, using context, where substitutions have occurred. If you have any questions or concerns about the context, text or information contained within the body of this dictation, please contact myself, the provider, for further clarification.

## 2023-09-27 NOTE — PROGRESS NOTES
[facility] - Outpatient Consultation  Jonah Dougherty 48 y.o. female MRN: [id number]  Encounter: [id number]          ASSESSMENT AND PLAN:      1. Left lower quadrant abdominal pain  - Ambulatory Referral to Gastroenterology  - Colonoscopy; Future  - polyethylene glycol (GaviLyte-G) 4000 mL solution; Take 4,000 mL by mouth once for 1 dose  Dispense: 4000 mL; Refill: 0  2. Gastroesophageal reflux disease without esophagitis  - EGD; Future  3. Hiatal hernia  - EGD; Future  4. Dyspepsia  - EGD; Future  5. Constipation, unspecified constipation type    Broad differential for patient's symptoms including scad, symptomatic uncomplicated diverticular disease, pain related to adhesions from prior C-sections, endometriosis, functional abdominal pain, bloating, IBS C. Given she has never had a prior colonoscopy, it is reasonable to proceed with this to rule out structural abnormalities in the lumen. We will also plan for upper endoscopy at that time to evaluate for persistent GERD with more notable severe symptoms nocturnally, as well as upper abdominal pain, hiatal hernia seen on imaging. She is to take MiraLAX daily for a week leading up to her bowel prep. She can continue to use Bentyl as needed as this is provided mild to moderate relief for her. Follow-up after procedures    ______________________________________________________________________    HPI:  Ms. Mishel Platt is a 49 y/o female presenting for evaluation of persistent left lower quadrant pain, upper abdominal pain, constipation. Is been ongoing for prolonged period of time, recently worsened thus she is seeking out care. She had a DVT leading to pulmonary embolism diagnosed in September 2022 thought to be related to prolonged travel. She was on anticoagulation but this has now been discontinued. During her CT PE studies there was noted colonic diverticulosis as well as small hiatal hernia.     She does not move her bowels regularly, although she does have some stool output roughly 5 days/week, she never feels like she is completely evacuating. She denies blood in the stool. No family history of colorectal cancer or other abnormalities in the colon. She is never had a colonoscopy. She had negative Cologuard in . REVIEW OF SYSTEMS:    CONSTITUTIONAL: Denies any fever, chills, rigors, and weight loss. HEENT: Denies odynophagia, tinnitus  CARDIOVASCULAR: No chest pain or palpitations. RESPIRATORY: Denies any cough, hemoptysis, shortness of breath or dyspnea on exertion. GASTROINTESTINAL: As noted in the History of Present Illness. GENITOURINARY: No problems with urination. Denies any hematuria or dysuria. NEUROLOGIC: No dizziness or vertigo, denies headaches. MUSCULOSKELETAL: Denies any muscle or joint pain. SKIN: Denies skin rashes or itching. ENDOCRINE:  Denies intolerance to heat or cold. PSYCHOSOCIAL: Denies depression or anxiety. Denies any recent memory loss.        Historical Information   Past Medical History:   Diagnosis Date   • Allergic     as long as i can remember dust pollen etc   • Anemia     i can tend to be anemic i control through diet & supplementn   • Anxiety     as long as i can remember   • DVT, lower extremity, distal, acute, right ([address]) 2022   • GERD (gastroesophageal reflux disease)     i have it on occasion   • GERD (gastroesophageal reflux disease)     i have it on occasion   • Headache(784.0)     i get tension headaches alot     Past Surgical History:   Procedure Laterality Date   •  SECTION  2005    2nd son's birth   • TUBAL LIGATION      after second son was born in      Social History   Social History     Substance and Sexual Activity   Alcohol Use Not Currently     Social History     Substance and Sexual Activity   Drug Use Never     Social History     Tobacco Use   Smoking Status Former   • Packs/day: 0.25   • Types: Cigarettes, E-Cigarettes   • Start date: 48   • Quit date: 2022   • Years since quittin.0   Smokeless Tobacco Never   Tobacco Comments    havent smoked in a long time     Family History   Problem Relation Age of Onset   • Hypertension Father    • Breast cancer Paternal Grandmother    • Diabetes Paternal Grandmother    • Substance Abuse Neg Hx    • Mental illness Neg Hx        Meds/Allergies       Current Outpatient Medications:   •  Acetaminophen 500 MG  •  albuterol (Proventil HFA) 90 mcg/act inhaler  •  ALPRAZolam (XANAX) 0.5 mg tablet  •  aspirin (ECOTRIN LOW STRENGTH) 81 mg EC tablet  •  clobetasol (TEMOVATE) 0.05 % ointment  •  dicyclomine (BENTYL) 20 mg tablet  •  fluticasone (FLONASE) 50 mcg/act nasal spray  •  levothyroxine (Euthyrox) 50 mcg tablet  •  polyethylene glycol (GaviLyte-G) 4000 mL solution  •  RA Acetaminophen Ex St 500 MG tablet  •  benzonatate (TESSALON) 200 MG capsule    Allergies   Allergen Reactions   • Iodine I 131 Tositumomab Rash     OK for CT per Dr Yair Simon   • Doxycycline GI Intolerance and Vomiting   • Latex    • Other Hives           Objective     Blood pressure 102/62, pulse 68, height 5' 6" (1.676 m), weight 92.1 kg (203 lb). Body mass index is 32.77 kg/m². PHYSICAL EXAM:      General Appearance:   Alert, cooperative, no distress   HEENT:   Normocephalic, atraumatic, anicteric. Neck:  Supple, symmetrical, trachea midline   Lungs:   Clear to auscultation bilaterally; no rales, rhonchi or wheezing; respirations unlabored    Heart[de-identified]   Regular rate and rhythm; no murmur, rub, or gallop. Abdomen:   Soft, non-tender, non-distended; normal bowel sounds; no masses, no organomegaly    Genitalia:   Deferred    Rectal:   Deferred    Extremities:  No cyanosis, clubbing or edema    Pulses:  2+ and symmetric    Skin:  No jaundice, rashes, or lesions          Lab Results:   No visits with results within 1 Day(s) from this visit.    Latest known visit with results is:   Office Visit on 08/15/2023   Component Date Value   • Glucose, Random 09/15/2023 85    • BUN 09/15/2023 13    • Creatinine 09/15/2023 0.79    • eGFR 09/15/2023 91    • SL AMB BUN/CREATININE RA* 09/15/2023 SEE NOTE:    • Sodium 09/15/2023 139    • Potassium 09/15/2023 4.1    • Chloride 09/15/2023 107    • CO2 09/15/2023 26    • Calcium 09/15/2023 8.3 (L)    • Protein, Total 09/15/2023 6.3    • Albumin 09/15/2023 3.7    • Globulin 09/15/2023 2.6    • Albumin/Globulin Ratio 09/15/2023 1.4    • TOTAL BILIRUBIN 09/15/2023 0.4    • Alkaline Phosphatase 09/15/2023 82    • AST 09/15/2023 12    • ALT 09/15/2023 10    • Total Cholesterol 09/15/2023 159    • HDL 09/15/2023 45 (L)    • Triglycerides 09/15/2023 108    • LDL Calculated 09/15/2023 94    • Chol HDLC Ratio 09/15/2023 3.5    • Non-HDL Cholesterol 09/15/2023 114    • TSH 09/15/2023 2.79    • Free t4 09/15/2023 1.1          Radiology Results:   No results found. Answers for HPI/ROS submitted by the patient on 9/26/2023  Chronicity: chronic  Onset: more than 1 year ago  Onset quality: undetermined  Frequency: intermittently  Episode duration: 7 Days  Progression since onset: waxing and waning  Pain location: LLQ, RUQ, periumbilical region, left flank  Pain - numeric: 8/10  Pain quality: burning, cramping, sharp  Radiates to: periumbilical region, left flank  anorexia: No  arthralgias: Yes  belching: No  constipation: No  diarrhea: No  dysuria: No  fever: No  flatus: No  frequency: No  headaches: No  hematochezia: No  hematuria: No  melena: No  myalgias: No  nausea:  No  weight loss: No  vomiting: No  Aggravated by: being still, certain positions, movement  Relieved by: nothing, activity, being still, passing flatus, recumbency, sitting up, standing

## 2023-09-27 NOTE — H&P (VIEW-ONLY)
[facility] - Outpatient Consultation  Maryjo Diaz 48 y.o. female MRN: [id number]  Encounter: [id number]          ASSESSMENT AND PLAN:      1. Left lower quadrant abdominal pain  - Ambulatory Referral to Gastroenterology  - Colonoscopy; Future  - polyethylene glycol (GaviLyte-G) 4000 mL solution; Take 4,000 mL by mouth once for 1 dose  Dispense: 4000 mL; Refill: 0  2. Gastroesophageal reflux disease without esophagitis  - EGD; Future  3. Hiatal hernia  - EGD; Future  4. Dyspepsia  - EGD; Future  5. Constipation, unspecified constipation type    Broad differential for patient's symptoms including scad, symptomatic uncomplicated diverticular disease, pain related to adhesions from prior C-sections, endometriosis, functional abdominal pain, bloating, IBS C. Given she has never had a prior colonoscopy, it is reasonable to proceed with this to rule out structural abnormalities in the lumen. We will also plan for upper endoscopy at that time to evaluate for persistent GERD with more notable severe symptoms nocturnally, as well as upper abdominal pain, hiatal hernia seen on imaging. She is to take MiraLAX daily for a week leading up to her bowel prep. She can continue to use Bentyl as needed as this is provided mild to moderate relief for her. Follow-up after procedures    ______________________________________________________________________    HPI:  Ms. Carey Sandoval is a 47 y/o female presenting for evaluation of persistent left lower quadrant pain, upper abdominal pain, constipation. Is been ongoing for prolonged period of time, recently worsened thus she is seeking out care. She had a DVT leading to pulmonary embolism diagnosed in September 2022 thought to be related to prolonged travel. She was on anticoagulation but this has now been discontinued. During her CT PE studies there was noted colonic diverticulosis as well as small hiatal hernia.     She does not move her bowels regularly, although she does have some stool output roughly 5 days/week, she never feels like she is completely evacuating. She denies blood in the stool. No family history of colorectal cancer or other abnormalities in the colon. She is never had a colonoscopy. She had negative Cologuard in . REVIEW OF SYSTEMS:    CONSTITUTIONAL: Denies any fever, chills, rigors, and weight loss. HEENT: Denies odynophagia, tinnitus  CARDIOVASCULAR: No chest pain or palpitations. RESPIRATORY: Denies any cough, hemoptysis, shortness of breath or dyspnea on exertion. GASTROINTESTINAL: As noted in the History of Present Illness. GENITOURINARY: No problems with urination. Denies any hematuria or dysuria. NEUROLOGIC: No dizziness or vertigo, denies headaches. MUSCULOSKELETAL: Denies any muscle or joint pain. SKIN: Denies skin rashes or itching. ENDOCRINE:  Denies intolerance to heat or cold. PSYCHOSOCIAL: Denies depression or anxiety. Denies any recent memory loss.        Historical Information   Past Medical History:   Diagnosis Date   • Allergic     as long as i can remember dust pollen etc   • Anemia     i can tend to be anemic i control through diet & supplementn   • Anxiety     as long as i can remember   • DVT, lower extremity, distal, acute, right ([address]) 2022   • GERD (gastroesophageal reflux disease)     i have it on occasion   • GERD (gastroesophageal reflux disease)     i have it on occasion   • Headache(784.0)     i get tension headaches alot     Past Surgical History:   Procedure Laterality Date   •  SECTION  2005    2nd son's birth   • TUBAL LIGATION      after second son was born in      Social History   Social History     Substance and Sexual Activity   Alcohol Use Not Currently     Social History     Substance and Sexual Activity   Drug Use Never     Social History     Tobacco Use   Smoking Status Former   • Packs/day: 0.25   • Types: Cigarettes, E-Cigarettes   • Start date: 48   • Quit date: 2022   • Years since quittin.0   Smokeless Tobacco Never   Tobacco Comments    havent smoked in a long time     Family History   Problem Relation Age of Onset   • Hypertension Father    • Breast cancer Paternal Grandmother    • Diabetes Paternal Grandmother    • Substance Abuse Neg Hx    • Mental illness Neg Hx        Meds/Allergies       Current Outpatient Medications:   •  Acetaminophen 500 MG  •  albuterol (Proventil HFA) 90 mcg/act inhaler  •  ALPRAZolam (XANAX) 0.5 mg tablet  •  aspirin (ECOTRIN LOW STRENGTH) 81 mg EC tablet  •  clobetasol (TEMOVATE) 0.05 % ointment  •  dicyclomine (BENTYL) 20 mg tablet  •  fluticasone (FLONASE) 50 mcg/act nasal spray  •  levothyroxine (Euthyrox) 50 mcg tablet  •  polyethylene glycol (GaviLyte-G) 4000 mL solution  •  RA Acetaminophen Ex St 500 MG tablet  •  benzonatate (TESSALON) 200 MG capsule    Allergies   Allergen Reactions   • Iodine I 131 Tositumomab Rash     OK for CT per Dr Rachelle Tucker   • Doxycycline GI Intolerance and Vomiting   • Latex    • Other Hives           Objective     Blood pressure 102/62, pulse 68, height 5' 6" (1.676 m), weight 92.1 kg (203 lb). Body mass index is 32.77 kg/m². PHYSICAL EXAM:      General Appearance:   Alert, cooperative, no distress   HEENT:   Normocephalic, atraumatic, anicteric. Neck:  Supple, symmetrical, trachea midline   Lungs:   Clear to auscultation bilaterally; no rales, rhonchi or wheezing; respirations unlabored    Heart[de-identified]   Regular rate and rhythm; no murmur, rub, or gallop. Abdomen:   Soft, non-tender, non-distended; normal bowel sounds; no masses, no organomegaly    Genitalia:   Deferred    Rectal:   Deferred    Extremities:  No cyanosis, clubbing or edema    Pulses:  2+ and symmetric    Skin:  No jaundice, rashes, or lesions          Lab Results:   No visits with results within 1 Day(s) from this visit.    Latest known visit with results is:   Office Visit on 08/15/2023   Component Date Value   • Glucose, Random 09/15/2023 85    • BUN 09/15/2023 13    • Creatinine 09/15/2023 0.79    • eGFR 09/15/2023 91    • SL AMB BUN/CREATININE RA* 09/15/2023 SEE NOTE:    • Sodium 09/15/2023 139    • Potassium 09/15/2023 4.1    • Chloride 09/15/2023 107    • CO2 09/15/2023 26    • Calcium 09/15/2023 8.3 (L)    • Protein, Total 09/15/2023 6.3    • Albumin 09/15/2023 3.7    • Globulin 09/15/2023 2.6    • Albumin/Globulin Ratio 09/15/2023 1.4    • TOTAL BILIRUBIN 09/15/2023 0.4    • Alkaline Phosphatase 09/15/2023 82    • AST 09/15/2023 12    • ALT 09/15/2023 10    • Total Cholesterol 09/15/2023 159    • HDL 09/15/2023 45 (L)    • Triglycerides 09/15/2023 108    • LDL Calculated 09/15/2023 94    • Chol HDLC Ratio 09/15/2023 3.5    • Non-HDL Cholesterol 09/15/2023 114    • TSH 09/15/2023 2.79    • Free t4 09/15/2023 1.1          Radiology Results:   No results found. Answers for HPI/ROS submitted by the patient on 9/26/2023  Chronicity: chronic  Onset: more than 1 year ago  Onset quality: undetermined  Frequency: intermittently  Episode duration: 7 Days  Progression since onset: waxing and waning  Pain location: LLQ, RUQ, periumbilical region, left flank  Pain - numeric: 8/10  Pain quality: burning, cramping, sharp  Radiates to: periumbilical region, left flank  anorexia: No  arthralgias: Yes  belching: No  constipation: No  diarrhea: No  dysuria: No  fever: No  flatus: No  frequency: No  headaches: No  hematochezia: No  hematuria: No  melena: No  myalgias: No  nausea:  No  weight loss: No  vomiting: No  Aggravated by: being still, certain positions, movement  Relieved by: nothing, activity, being still, passing flatus, recumbency, sitting up, standing

## 2023-10-11 ENCOUNTER — TELEPHONE (OUTPATIENT)
Age: 53
End: 2023-10-11

## 2023-10-11 ENCOUNTER — ANESTHESIA (OUTPATIENT)
Dept: ANESTHESIOLOGY | Facility: HOSPITAL | Age: 53
End: 2023-10-11

## 2023-10-11 ENCOUNTER — ANESTHESIA EVENT (OUTPATIENT)
Dept: ANESTHESIOLOGY | Facility: HOSPITAL | Age: 53
End: 2023-10-11

## 2023-10-11 NOTE — TELEPHONE ENCOUNTER
Patients GI provider:  Dr. Candace Baptiste     Number to return call: (    Reason for call: Pt calling wanted to let office know chuck she was able to move echo to prior to the colonoscopy.    Scheduled procedure/appointment date if applicable: Apt/procedure

## 2023-10-12 ENCOUNTER — TELEPHONE (OUTPATIENT)
Dept: GASTROENTEROLOGY | Facility: CLINIC | Age: 53
End: 2023-10-12

## 2023-10-12 NOTE — TELEPHONE ENCOUNTER
Our mutual patient is scheduled for procedure:  COLONOSCOPY & EGD     On: 10/24/23     With: Virgen Dave     Our office is requesting cardiac clearance for this procedure.      Physician Approving clearance: _______________________

## 2023-10-17 ENCOUNTER — TELEPHONE (OUTPATIENT)
Dept: GASTROENTEROLOGY | Facility: CLINIC | Age: 53
End: 2023-10-17

## 2023-10-18 ENCOUNTER — ANESTHESIA EVENT (OUTPATIENT)
Dept: ANESTHESIOLOGY | Facility: HOSPITAL | Age: 53
End: 2023-10-18

## 2023-10-18 ENCOUNTER — NURSE TRIAGE (OUTPATIENT)
Age: 53
End: 2023-10-18

## 2023-10-18 ENCOUNTER — ANESTHESIA (OUTPATIENT)
Dept: ANESTHESIOLOGY | Facility: HOSPITAL | Age: 53
End: 2023-10-18

## 2023-10-18 NOTE — TELEPHONE ENCOUNTER
LMOM PER CONSENT, PT CAN TAKE XANAX AT HS PRIOR TO PROCEDURE, NOTHING AFTER MIDNIGHT, CAN TAKE PRESCRIBED MEDICATIONS IN THE AM WITH A SIP OF WATER 2 HOURS PRIOR TO PROCEDURE. PLEASE CALL BACK TO DISCUSS IF ANY FURTHER QUESTIONS.

## 2023-10-18 NOTE — TELEPHONE ENCOUNTER
----- Message from Issac Kang sent at 10/18/2023 11:40 AM EDT -----  Pt calling stating she takes a xanax at bed time every night. Pt asking can she take this night before procedure. Pt also asking of another medication she takes in the morning.

## 2023-10-20 DIAGNOSIS — R07.9 CHEST PAIN: Primary | ICD-10-CM

## 2023-10-22 ENCOUNTER — ANESTHESIA (OUTPATIENT)
Dept: ANESTHESIOLOGY | Facility: HOSPITAL | Age: 53
End: 2023-10-22

## 2023-10-22 ENCOUNTER — ANESTHESIA EVENT (OUTPATIENT)
Dept: ANESTHESIOLOGY | Facility: HOSPITAL | Age: 53
End: 2023-10-22

## 2023-10-23 ENCOUNTER — TELEPHONE (OUTPATIENT)
Dept: GASTROENTEROLOGY | Facility: CLINIC | Age: 53
End: 2023-10-23

## 2023-10-23 ENCOUNTER — TELEPHONE (OUTPATIENT)
Dept: CARDIOLOGY CLINIC | Facility: CLINIC | Age: 53
End: 2023-10-23

## 2023-10-23 ENCOUNTER — HOSPITAL ENCOUNTER (OUTPATIENT)
Dept: NON INVASIVE DIAGNOSTICS | Facility: CLINIC | Age: 53
Discharge: HOME | End: 2023-10-23
Payer: COMMERCIAL

## 2023-10-23 VITALS
HEART RATE: 82 BPM | SYSTOLIC BLOOD PRESSURE: 110 MMHG | OXYGEN SATURATION: 99 % | BODY MASS INDEX: 32.62 KG/M2 | HEIGHT: 66 IN | WEIGHT: 203 LBS | DIASTOLIC BLOOD PRESSURE: 70 MMHG

## 2023-10-23 DIAGNOSIS — R07.9 CHEST PAIN: ICD-10-CM

## 2023-10-23 LAB
MAX HR PERCENT: 92 %
MAX HR: 157 BPM
RATE PRESSURE PRODUCT: NORMAL
SL CV STRESS RECOVERY BP: NORMAL MMHG
SL CV STRESS RECOVERY HR: 90 BPM
SL CV STRESS RECOVERY O2 SAT: 100 %
SL CV STRESS STAGE REACHED: 3
STRESS ANGINA INDEX: 0
STRESS BASELINE BP: NORMAL MMHG
STRESS BASELINE HR: 82 BPM
STRESS O2 SAT REST: 99 %
STRESS PEAK HR: 144 BPM
STRESS POST ESTIMATED WORKLOAD: 10.1 METS
STRESS POST EXERCISE DUR MIN: 7 MIN
STRESS POST EXERCISE DUR SEC: 0 SEC
STRESS POST O2 SAT PEAK: 100 %
STRESS POST PEAK BP: 170 MMHG

## 2023-10-23 PROCEDURE — 93017 CV STRESS TEST TRACING ONLY: CPT

## 2023-10-23 PROCEDURE — 93016 CV STRESS TEST SUPVJ ONLY: CPT | Performed by: INTERNAL MEDICINE

## 2023-10-23 PROCEDURE — 93018 CV STRESS TEST I&R ONLY: CPT | Performed by: INTERNAL MEDICINE

## 2023-10-23 NOTE — TELEPHONE ENCOUNTER
Risk assessment letter entered in chart for EGD and Colonoscopy scheduled for tomorrow. Stress test was completed and result is in chart. It was ordered prior to giving final clearance.

## 2023-10-23 NOTE — TELEPHONE ENCOUNTER
Our mutual patient is scheduled for procedure:  EGD/Colonoscopy     On:10/24/2023     With: Dr Leo Aldana office is requesting Cardiac clearance for this procedure.      Physician Approving clearance: _______________________

## 2023-10-24 ENCOUNTER — ANESTHESIA (OUTPATIENT)
Dept: GASTROENTEROLOGY | Facility: AMBULARY SURGERY CENTER | Age: 53
End: 2023-10-24

## 2023-10-24 ENCOUNTER — ANESTHESIA EVENT (OUTPATIENT)
Dept: GASTROENTEROLOGY | Facility: AMBULARY SURGERY CENTER | Age: 53
End: 2023-10-24

## 2023-10-24 ENCOUNTER — HOSPITAL ENCOUNTER (OUTPATIENT)
Dept: GASTROENTEROLOGY | Facility: AMBULARY SURGERY CENTER | Age: 53
Setting detail: OUTPATIENT SURGERY
Discharge: HOME | End: 2023-10-24
Attending: INTERNAL MEDICINE | Admitting: INTERNAL MEDICINE
Payer: COMMERCIAL

## 2023-10-24 VITALS
HEIGHT: 65 IN | RESPIRATION RATE: 17 BRPM | BODY MASS INDEX: 32.99 KG/M2 | WEIGHT: 198 LBS | OXYGEN SATURATION: 100 % | TEMPERATURE: 96.2 F | SYSTOLIC BLOOD PRESSURE: 122 MMHG | DIASTOLIC BLOOD PRESSURE: 59 MMHG | HEART RATE: 66 BPM

## 2023-10-24 DIAGNOSIS — K44.9 HIATAL HERNIA: ICD-10-CM

## 2023-10-24 DIAGNOSIS — R10.13 DYSPEPSIA: ICD-10-CM

## 2023-10-24 DIAGNOSIS — K21.9 GASTROESOPHAGEAL REFLUX DISEASE WITHOUT ESOPHAGITIS: ICD-10-CM

## 2023-10-24 DIAGNOSIS — K20.90 ESOPHAGITIS: Primary | ICD-10-CM

## 2023-10-24 DIAGNOSIS — R10.32 LEFT LOWER QUADRANT ABDOMINAL PAIN: ICD-10-CM

## 2023-10-24 LAB
CHEST PAIN STATEMENT: NORMAL
MAX DIASTOLIC BP: 68 MMHG
MAX HEART RATE: 157 BPM
MAX PREDICTED HEART RATE: 169 BPM
MAX. SYSTOLIC BP: 170 MMHG
PROTOCOL NAME: NORMAL
REASON FOR TERMINATION: NORMAL
TARGET HR FORMULA: NORMAL
TIME IN EXERCISE PHASE: NORMAL

## 2023-10-24 PROCEDURE — 88305 TISSUE EXAM BY PATHOLOGIST: CPT | Performed by: STUDENT IN AN ORGANIZED HEALTH CARE EDUCATION/TRAINING PROGRAM

## 2023-10-24 PROCEDURE — 88342 IMHCHEM/IMCYTCHM 1ST ANTB: CPT | Performed by: STUDENT IN AN ORGANIZED HEALTH CARE EDUCATION/TRAINING PROGRAM

## 2023-10-24 RX ORDER — LIDOCAINE HYDROCHLORIDE 10 MG/ML
INJECTION, SOLUTION EPIDURAL; INFILTRATION; INTRACAUDAL; PERINEURAL AS NEEDED
Status: DISCONTINUED | OUTPATIENT
Start: 2023-10-24 | End: 2023-10-24

## 2023-10-24 RX ORDER — SODIUM CHLORIDE, SODIUM LACTATE, POTASSIUM CHLORIDE, CALCIUM CHLORIDE 600; 310; 30; 20 MG/100ML; MG/100ML; MG/100ML; MG/100ML
INJECTION, SOLUTION INTRAVENOUS CONTINUOUS PRN
Status: DISCONTINUED | OUTPATIENT
Start: 2023-10-24 | End: 2023-10-24

## 2023-10-24 RX ORDER — PROPOFOL 10 MG/ML
INJECTION, EMULSION INTRAVENOUS AS NEEDED
Status: DISCONTINUED | OUTPATIENT
Start: 2023-10-24 | End: 2023-10-24

## 2023-10-24 RX ADMIN — SODIUM CHLORIDE, SODIUM LACTATE, POTASSIUM CHLORIDE, AND CALCIUM CHLORIDE: .6; .31; .03; .02 INJECTION, SOLUTION INTRAVENOUS at 11:36

## 2023-10-24 RX ADMIN — PROPOFOL 40 MG: 10 INJECTION, EMULSION INTRAVENOUS at 12:00

## 2023-10-24 RX ADMIN — PROPOFOL 50 MG: 10 INJECTION, EMULSION INTRAVENOUS at 11:47

## 2023-10-24 RX ADMIN — PROPOFOL 50 MG: 10 INJECTION, EMULSION INTRAVENOUS at 11:46

## 2023-10-24 RX ADMIN — LIDOCAINE HYDROCHLORIDE 50 MG: 10 INJECTION, SOLUTION EPIDURAL; INFILTRATION; INTRACAUDAL; PERINEURAL at 11:39

## 2023-10-24 RX ADMIN — PROPOFOL 50 MG: 10 INJECTION, EMULSION INTRAVENOUS at 11:40

## 2023-10-24 RX ADMIN — PROPOFOL 50 MG: 10 INJECTION, EMULSION INTRAVENOUS at 11:43

## 2023-10-24 RX ADMIN — PROPOFOL 50 MG: 10 INJECTION, EMULSION INTRAVENOUS at 11:53

## 2023-10-24 RX ADMIN — PROPOFOL 50 MG: 10 INJECTION, EMULSION INTRAVENOUS at 11:50

## 2023-10-24 RX ADMIN — PROPOFOL 150 MG: 10 INJECTION, EMULSION INTRAVENOUS at 11:39

## 2023-10-24 NOTE — ANESTHESIA PREPROCEDURE EVALUATION
Procedure:  COLONOSCOPY  EGD    Relevant Problems   CARDIO   (+) Chest pain syndrome   (+) Migraines   (+) Mixed hyperlipidemia      ENDO   (+) Hypothyroidism      NEURO/PSYCH   (+) Anxiety   (+) Migraines        Physical Exam    Airway    Mallampati score: II  TM Distance: >3 FB  Neck ROM: full     Dental   No notable dental hx     Cardiovascular  Cardiovascular exam normal    Pulmonary  Pulmonary exam normal     Other Findings      Anesthesia Plan  ASA Score- 2     Anesthesia Type- IV sedation with anesthesia with ASA Monitors. Additional Monitors:     Airway Plan:     Comment: Recent stress test WNL. Has had Covid 5 times. Hx DVT/PE; chronic SOB. .       Plan Factors-Exercise tolerance (METS): >4 METS. Chart reviewed. EKG reviewed. Imaging results reviewed. Existing labs reviewed. Patient summary reviewed. Patient is not a current smoker. Induction- intravenous. Postoperative Plan-     Informed Consent- Anesthetic plan and risks discussed with patient. I personally reviewed this patient with the CRNA. Discussed and agreed on the Anesthesia Plan with the CRNA. Sachi Ruano

## 2023-10-24 NOTE — INTERVAL H&P NOTE
H&P reviewed. After examining the patient I find no changes in the patients condition since the H&P had been written.     Vitals:    10/24/23 1047   BP: 124/58   Pulse: 66   Resp: 19   Temp: 98.1 °F (36.7 °C)   SpO2: 99%

## 2023-10-24 NOTE — ANESTHESIA POSTPROCEDURE EVALUATION
Post-Op Assessment Note    CV Status:  Stable  Pain Score: 0    Pain management: adequate     Mental Status:  Alert and awake   Hydration Status:  Euvolemic and stable   PONV Controlled:  Controlled   Airway Patency:  Patent and adequate      Post Op Vitals Reviewed: Yes      Staff: CRNA         No notable events documented.     /67 (10/24/23 1207)    Temp (!) 96.2 °F (35.7 °C) (10/24/23 1207)    Pulse 73 (10/24/23 1207)   Resp 18 (10/24/23 1207)    SpO2 99 % (10/24/23 1207)

## 2023-10-27 PROCEDURE — 88342 IMHCHEM/IMCYTCHM 1ST ANTB: CPT | Performed by: STUDENT IN AN ORGANIZED HEALTH CARE EDUCATION/TRAINING PROGRAM

## 2023-10-27 PROCEDURE — 88305 TISSUE EXAM BY PATHOLOGIST: CPT | Performed by: STUDENT IN AN ORGANIZED HEALTH CARE EDUCATION/TRAINING PROGRAM

## 2023-11-01 ENCOUNTER — TELEPHONE (OUTPATIENT)
Dept: GASTROENTEROLOGY | Facility: CLINIC | Age: 53
End: 2023-11-01

## 2023-11-01 ENCOUNTER — PREP FOR PROCEDURE (OUTPATIENT)
Dept: GASTROENTEROLOGY | Facility: CLINIC | Age: 53
End: 2023-11-01

## 2023-11-01 DIAGNOSIS — K31.A0 INTESTINAL METAPLASIA OF STOMACH: ICD-10-CM

## 2023-11-01 DIAGNOSIS — K20.90 ESOPHAGITIS: Primary | ICD-10-CM

## 2023-11-03 DIAGNOSIS — R10.32 LEFT LOWER QUADRANT ABDOMINAL PAIN: ICD-10-CM

## 2023-11-03 RX ORDER — DICYCLOMINE HCL 20 MG
20 TABLET ORAL EVERY 6 HOURS
Qty: 60 TABLET | Refills: 2 | Status: SHIPPED | OUTPATIENT
Start: 2023-11-03

## 2023-11-29 ENCOUNTER — OFFICE VISIT (OUTPATIENT)
Dept: GASTROENTEROLOGY | Facility: CLINIC | Age: 53
End: 2023-11-29
Payer: COMMERCIAL

## 2023-11-29 VITALS
BODY MASS INDEX: 33.82 KG/M2 | SYSTOLIC BLOOD PRESSURE: 112 MMHG | WEIGHT: 203 LBS | DIASTOLIC BLOOD PRESSURE: 66 MMHG | HEART RATE: 84 BPM | HEIGHT: 65 IN

## 2023-11-29 DIAGNOSIS — K20.90 ESOPHAGITIS: Primary | ICD-10-CM

## 2023-11-29 DIAGNOSIS — K57.90 DIVERTICULAR DISEASE: ICD-10-CM

## 2023-11-29 DIAGNOSIS — K44.9 HIATAL HERNIA: ICD-10-CM

## 2023-11-29 DIAGNOSIS — K31.A11 INTESTINAL METAPLASIA OF ANTRUM OF STOMACH WITHOUT DYSPLASIA: ICD-10-CM

## 2023-11-29 PROCEDURE — 99214 OFFICE O/P EST MOD 30 MIN: CPT | Performed by: INTERNAL MEDICINE

## 2023-11-29 RX ORDER — SUCRALFATE ORAL 1 G/10ML
1 SUSPENSION ORAL 3 TIMES DAILY PRN
Qty: 300 ML | Refills: 1 | Status: SHIPPED | OUTPATIENT
Start: 2023-11-29 | End: 2023-12-29

## 2023-11-29 NOTE — PROGRESS NOTES
[facility] - Outpatient Consultation  Bekah Fong 48 y.o. female MRN: [id number]  Encounter: [id number]          ASSESSMENT AND PLAN:      1. Esophagitis  - sucralfate (CARAFATE) 1 g/10 mL suspension; Take 10 mL (1 g total) by mouth 3 (three) times a day as needed (reflux)  Dispense: 300 mL; Refill: 1  2. Hiatal hernia  3. Intestinal metaplasia of antrum of stomach without dysplasia  4. Diverticular disease    28-year-old female presenting for follow-up after recent EGD and colonoscopy    She will need repeat EGD to document healing of esophagitis as well as for gastric intestinal mapping in the setting of intestinal metaplasia. She has been on Nexium twice daily and overall does not feel it is made much difference in her abdominal pain. Neck step of care will be determined based on visualization of esophageal mucosa. Discussed that if she continues to have severe symptoms despite being on twice daily PPI further consideration for antireflux surgery and hiatal hernia repair could be considered but this would warrant further workup with impedance and manometry testing prior to this. Mild left lower quadrant pain may be secondary to symptomatic uncomplicated diverticular disease. Bentyl as needed can be continued    Follow-up after repeat EGD  ______________________________________________________________________    HPI:  Lizzeth Samson is a pleasant 47 y/o female presenting for follow-up after recent EGD and colonoscopy that was completed for evaluation of persistent left lower quadrant pain, upper abdominal pain, constipation. EGD demonstrated grade C esophagitis, 4 cm hiatal hernia. Gastric biopsies demonstrated focal intestinal metaplasia without dysplasia. Colonoscopy revealed 1 subcentimeter polyp and left-sided diverticular disease. Summary of HPI from 9/27/2023 :  GI symptoms ongoing for prolonged period of time, recently worsened.  She had a DVT leading to pulmonary embolism diagnosed in 2022 thought to be related to prolonged travel. She was on anticoagulation but this has now been discontinued. During her CT PE studies there was noted colonic diverticulosis as well as small hiatal hernia. She does not move her bowels regularly, although she does have some stool output roughly 5 days/week, she never feels like she is completely evacuating. She denies blood in the stool. No family history of colorectal cancer or other abnormalities in the colon. She had negative Cologuard in . REVIEW OF SYSTEMS:    CONSTITUTIONAL: Denies any fever, chills, rigors, and weight loss. HEENT: Denies odynophagia, tinnitus  CARDIOVASCULAR: No chest pain or palpitations. RESPIRATORY: Denies any cough, hemoptysis, shortness of breath or dyspnea on exertion. GASTROINTESTINAL: As noted in the History of Present Illness. GENITOURINARY: No problems with urination. Denies any hematuria or dysuria. NEUROLOGIC: No dizziness or vertigo, denies headaches. MUSCULOSKELETAL: Denies any muscle or joint pain. SKIN: Denies skin rashes or itching. ENDOCRINE:  Denies intolerance to heat or cold. PSYCHOSOCIAL: Denies depression or anxiety. Denies any recent memory loss.        Historical Information   Past Medical History:   Diagnosis Date   • Allergic     as long as i can remember dust pollen etc   • Anemia     i can tend to be anemic i control through diet & supplementn   • Anxiety     as long as i can remember   • DVT, lower extremity, distal, acute, right (HCC) 2022   • GERD (gastroesophageal reflux disease)     i have it on occasion   • GERD (gastroesophageal reflux disease)     i have it on occasion   • Headache(784.0)     i get tension headaches alot   • Pulmonary embolism (HCC)    • Thrombosis     shallow vein R leg     Past Surgical History:   Procedure Laterality Date   •  SECTION  2005    2nd son's birth   • COLONOSCOPY     • TUBAL LIGATION after second son was born in    • UPPER GASTROINTESTINAL ENDOSCOPY       Social History   Social History     Substance and Sexual Activity   Alcohol Use Not Currently     Social History     Substance and Sexual Activity   Drug Use Never     Social History     Tobacco Use   Smoking Status Former   • Packs/day: 0.25   • Types: Cigarettes, E-Cigarettes   • Start date:    • Quit date: 2022   • Years since quittin.2   Smokeless Tobacco Never   Tobacco Comments    havent smoked in a long time     Family History   Problem Relation Age of Onset   • Hypertension Father    • Breast cancer Paternal Grandmother    • Diabetes Paternal Grandmother    • Substance Abuse Neg Hx    • Mental illness Neg Hx        Meds/Allergies       Current Outpatient Medications:   •  Acetaminophen 500 MG  •  albuterol (Proventil HFA) 90 mcg/act inhaler  •  ALPRAZolam (XANAX) 0.5 mg tablet  •  aspirin (ECOTRIN LOW STRENGTH) 81 mg EC tablet  •  benzonatate (TESSALON) 200 MG capsule  •  clobetasol (TEMOVATE) 0.05 % ointment  •  dicyclomine (BENTYL) 20 mg tablet  •  esomeprazole (NexIUM) 20 mg capsule  •  fluticasone (FLONASE) 50 mcg/act nasal spray  •  levothyroxine (Euthyrox) 50 mcg tablet  •  RA Acetaminophen Ex St 500 MG tablet  •  sucralfate (CARAFATE) 1 g/10 mL suspension    Allergies   Allergen Reactions   • Iodine I 131 Tositumomab Rash     OK for CT per Dr Rusty Velez   • Doxycycline GI Intolerance and Vomiting   • Latex    • Other Hives           Objective     Blood pressure 112/66, pulse 84, height 5' 5" (1.651 m), weight 92.1 kg (203 lb). Body mass index is 33.78 kg/m². PHYSICAL EXAM:      General Appearance:   Alert, cooperative, no distress   HEENT:   Normocephalic, atraumatic, anicteric. Neck:  Supple, symmetrical, trachea midline   Lungs:   Clear to auscultation bilaterally; no rales, rhonchi or wheezing; respirations unlabored    Heart[de-identified]   Regular rate and rhythm; no murmur, rub, or gallop.    Abdomen:   Soft, non-tender, non-distended; normal bowel sounds; no masses, no organomegaly    Genitalia:   Deferred    Rectal:   Deferred    Extremities:  No cyanosis, clubbing or edema    Pulses:  2+ and symmetric    Skin:  No jaundice, rashes, or lesions          Lab Results:   No visits with results within 1 Day(s) from this visit. Latest known visit with results is:   Hospital Outpatient Visit on 10/24/2023   Component Date Value   • Case Report 10/24/2023                      Value:Surgical Pathology Report                         Case: [id number]                                   Authorizing Provider:  Margarita Adorno DO    Collected:           10/24/2023 1142              Ordering Location:     [facility]        Received:            10/24/2023 [address] [facility]                                                    Pathologist:           Babita Aleman MD                                                            Specimens:   A) - Stomach, gastric bx R/O hpylori                                                                B) - Polyp, Colorectal, hepatic flexure polyp cold snare                                  • Final Diagnosis 10/24/2023                      Value: This result contains rich text formatting which cannot be displayed here. • Additional Information 10/24/2023                      Value: This result contains rich text formatting which cannot be displayed here. • Synoptic Checklist 10/24/2023                      Value:                            COLON/RECTUM POLYP FORM - GI - B                                                                                     :    Adenoma(s)     • Gross Description 10/24/2023                      Value: This result contains rich text formatting which cannot be displayed here.    • Clinical Information 10/24/2023                      Value:INDICATION:  Hiatal hernia, Dyspepsia, Gastroesophageal reflux disease without esophagitis   FINDINGS:  · Grade C esophagitis with mucosal breaks measuring 5 mm or more, continuous between folds, covering less than 75% of the circumference, showing edematous and erythematous mucosa with erosion in the middle third of the esophagus and lower third of the esophagus  · 4 cm hiatal hernia - GE junction 35 cm from the incisors, diaphragmatic impression 39 cm from the incisors:  Hill classification: Grade IV  · The body of the stomach and antrum appeared normal. Performed random biopsy using biopsy forceps to rule out H. pylori. · The duodenum appeared normal.  · One 5 mm polyp in the hepatic flexure; removed by cold snare  · Multiple small and medium pancolonic diverticula of moderate severity         Radiology Results:   No results found. Answers submitted by the patient for this visit:  Abdominal Pain Questionnaire (Submitted on 11/28/2023)  Chief Complaint: Abdominal pain  Chronicity: chronic  Onset: more than 1 year ago  Onset quality: undetermined  Frequency: daily  Episode duration: 7 Days  Progression since onset: waxing and waning  Pain location: LLQ, left flank  Pain - numeric: 6/10  Pain quality: aching, burning, cramping, dull, sharp, tearing  Radiates to: LLQ, periumbilical region, chest, back, pelvis, perineum  anorexia: No  arthralgias: Yes  belching: No  constipation: Yes  diarrhea: Yes  dysuria: No  fever: No  flatus: No  frequency: No  headaches: No  hematochezia: No  hematuria: No  melena: No  myalgias: Yes  nausea:  No  weight loss: No  vomiting: No  Aggravated by: nothing, being still, bowel movement, certain positions, eating  Relieved by: nothing, being still, bowel movements, certain positions, eating, liquids, movement, palpation, passing flatus, recumbency, sitting up, standing  Diagnostic workup: lower endoscopy, upper endoscopy

## 2023-11-29 NOTE — H&P (VIEW-ONLY)
[facility] - Outpatient Consultation  Cindy Gilbert 53 y.o. female MRN: [id number]  Encounter: [id number]          ASSESSMENT AND PLAN:      1. Esophagitis  - sucralfate (CARAFATE) 1 g/10 mL suspension; Take 10 mL (1 g total) by mouth 3 (three) times a day as needed (reflux)  Dispense: 300 mL; Refill: 1  2. Hiatal hernia  3. Intestinal metaplasia of antrum of stomach without dysplasia  4. Diverticular disease    53-year-old female presenting for follow-up after recent EGD and colonoscopy    She will need repeat EGD to document healing of esophagitis as well as for gastric intestinal mapping in the setting of intestinal metaplasia.  She has been on Nexium twice daily and overall does not feel it is made much difference in her abdominal pain.  Neck step of care will be determined based on visualization of esophageal mucosa.  Discussed that if she continues to have severe symptoms despite being on twice daily PPI further consideration for antireflux surgery and hiatal hernia repair could be considered but this would warrant further workup with impedance and manometry testing prior to this.    Mild left lower quadrant pain may be secondary to symptomatic uncomplicated diverticular disease.  Bentyl as needed can be continued    Follow-up after repeat EGD  ______________________________________________________________________    HPI:  Cindy Gilbert is a pleasant 52 y/o female presenting for follow-up after recent EGD and colonoscopy that was completed for evaluation of persistent left lower quadrant pain, upper abdominal pain, constipation.     EGD demonstrated grade C esophagitis, 4 cm hiatal hernia.  Gastric biopsies demonstrated focal intestinal metaplasia without dysplasia.  Colonoscopy revealed 1 subcentimeter polyp and left-sided diverticular disease.    Summary of HPI from 9/27/2023 :  GI symptoms ongoing for prolonged period of time, recently worsened. She had a DVT leading to  pulmonary embolism diagnosed in 2022 thought to be related to prolonged travel.  She was on anticoagulation but this has now been discontinued.  During her CT PE studies there was noted colonic diverticulosis as well as small hiatal hernia.   She does not move her bowels regularly, although she does have some stool output roughly 5 days/week, she never feels like she is completely evacuating.  She denies blood in the stool.  No family history of colorectal cancer or other abnormalities in the colon.    She had negative Cologuard in .      REVIEW OF SYSTEMS:    CONSTITUTIONAL: Denies any fever, chills, rigors, and weight loss.  HEENT: Denies odynophagia, tinnitus  CARDIOVASCULAR: No chest pain or palpitations.   RESPIRATORY: Denies any cough, hemoptysis, shortness of breath or dyspnea on exertion.  GASTROINTESTINAL: As noted in the History of Present Illness.   GENITOURINARY: No problems with urination. Denies any hematuria or dysuria.  NEUROLOGIC: No dizziness or vertigo, denies headaches.   MUSCULOSKELETAL: Denies any muscle or joint pain.   SKIN: Denies skin rashes or itching.   ENDOCRINE:  Denies intolerance to heat or cold.  PSYCHOSOCIAL: Denies depression or anxiety. Denies any recent memory loss.       Historical Information   Past Medical History:   Diagnosis Date   • Allergic     as long as i can remember dust pollen etc   • Anemia     i can tend to be anemic i control through diet & supplementn   • Anxiety     as long as i can remember   • DVT, lower extremity, distal, acute, right (HCC) 2022   • GERD (gastroesophageal reflux disease)     i have it on occasion   • GERD (gastroesophageal reflux disease)     i have it on occasion   • Headache(784.0)     i get tension headaches alot   • Pulmonary embolism (HCC)    • Thrombosis     shallow vein R leg     Past Surgical History:   Procedure Laterality Date   •  SECTION  2005    2nd son's birth   • COLONOSCOPY     • TUBAL LIGATION  "     after second son was born in    • UPPER GASTROINTESTINAL ENDOSCOPY       Social History   Social History     Substance and Sexual Activity   Alcohol Use Not Currently     Social History     Substance and Sexual Activity   Drug Use Never     Social History     Tobacco Use   Smoking Status Former   • Packs/day: 0.25   • Types: Cigarettes, E-Cigarettes   • Start date:    • Quit date: 2022   • Years since quittin.2   Smokeless Tobacco Never   Tobacco Comments    havent smoked in a long time     Family History   Problem Relation Age of Onset   • Hypertension Father    • Breast cancer Paternal Grandmother    • Diabetes Paternal Grandmother    • Substance Abuse Neg Hx    • Mental illness Neg Hx        Meds/Allergies       Current Outpatient Medications:   •  Acetaminophen 500 MG  •  albuterol (Proventil HFA) 90 mcg/act inhaler  •  ALPRAZolam (XANAX) 0.5 mg tablet  •  aspirin (ECOTRIN LOW STRENGTH) 81 mg EC tablet  •  benzonatate (TESSALON) 200 MG capsule  •  clobetasol (TEMOVATE) 0.05 % ointment  •  dicyclomine (BENTYL) 20 mg tablet  •  esomeprazole (NexIUM) 20 mg capsule  •  fluticasone (FLONASE) 50 mcg/act nasal spray  •  levothyroxine (Euthyrox) 50 mcg tablet  •  RA Acetaminophen Ex St 500 MG tablet  •  sucralfate (CARAFATE) 1 g/10 mL suspension    Allergies   Allergen Reactions   • Iodine I 131 Tositumomab Rash     OK for CT per Dr Alcantar   • Doxycycline GI Intolerance and Vomiting   • Latex    • Other Hives           Objective     Blood pressure 112/66, pulse 84, height 5' 5\" (1.651 m), weight 92.1 kg (203 lb). Body mass index is 33.78 kg/m².        PHYSICAL EXAM:      General Appearance:   Alert, cooperative, no distress   HEENT:   Normocephalic, atraumatic, anicteric.     Neck:  Supple, symmetrical, trachea midline   Lungs:   Clear to auscultation bilaterally; no rales, rhonchi or wheezing; respirations unlabored    Heart::   Regular rate and rhythm; no murmur, rub, or gallop.   Abdomen:   Soft, " non-tender, non-distended; normal bowel sounds; no masses, no organomegaly    Genitalia:   Deferred    Rectal:   Deferred    Extremities:  No cyanosis, clubbing or edema    Pulses:  2+ and symmetric    Skin:  No jaundice, rashes, or lesions          Lab Results:   No visits with results within 1 Day(s) from this visit.   Latest known visit with results is:   Hospital Outpatient Visit on 10/24/2023   Component Date Value   • Case Report 10/24/2023                      Value:Surgical Pathology Report                         Case: [id number]                                   Authorizing Provider:  Candace Baptiste DO    Collected:           10/24/2023 1142              Ordering Location:     [facility]        Received:            10/24/2023 1740                                     [facility]                                                    Pathologist:           Fadi Ohara MD                                                            Specimens:   A) - Stomach, gastric bx R/O hpylori                                                                B) - Polyp, Colorectal, hepatic flexure polyp cold snare                                  • Final Diagnosis 10/24/2023                      Value:This result contains rich text formatting which cannot be displayed here.   • Additional Information 10/24/2023                      Value:This result contains rich text formatting which cannot be displayed here.   • Synoptic Checklist 10/24/2023                      Value:                            COLON/RECTUM POLYP FORM - GI - B                                                                                     :    Adenoma(s)     • Gross Description 10/24/2023                      Value:This result contains rich text formatting which cannot be displayed here.   • Clinical Information 10/24/2023                      Value:INDICATION:  Hiatal hernia, Dyspepsia, Gastroesophageal reflux disease without  esophagitis   FINDINGS:  · Grade C esophagitis with mucosal breaks measuring 5 mm or more, continuous between folds, covering less than 75% of the circumference, showing edematous and erythematous mucosa with erosion in the middle third of the esophagus and lower third of the esophagus  · 4 cm hiatal hernia - GE junction 35 cm from the incisors, diaphragmatic impression 39 cm from the incisors:  Hill classification: Grade IV  · The body of the stomach and antrum appeared normal. Performed random biopsy using biopsy forceps to rule out H. pylori.  · The duodenum appeared normal.  · One 5 mm polyp in the hepatic flexure; removed by cold snare  · Multiple small and medium pancolonic diverticula of moderate severity         Radiology Results:   No results found.  Answers submitted by the patient for this visit:  Abdominal Pain Questionnaire (Submitted on 11/28/2023)  Chief Complaint: Abdominal pain  Chronicity: chronic  Onset: more than 1 year ago  Onset quality: undetermined  Frequency: daily  Episode duration: 7 Days  Progression since onset: waxing and waning  Pain location: LLQ, left flank  Pain - numeric: 6/10  Pain quality: aching, burning, cramping, dull, sharp, tearing  Radiates to: LLQ, periumbilical region, chest, back, pelvis, perineum  anorexia: No  arthralgias: Yes  belching: No  constipation: Yes  diarrhea: Yes  dysuria: No  fever: No  flatus: No  frequency: No  headaches: No  hematochezia: No  hematuria: No  melena: No  myalgias: Yes  nausea: No  weight loss: No  vomiting: No  Aggravated by: nothing, being still, bowel movement, certain positions, eating  Relieved by: nothing, being still, bowel movements, certain positions, eating, liquids, movement, palpation, passing flatus, recumbency, sitting up, standing  Diagnostic workup: lower endoscopy, upper endoscopy

## 2023-12-10 DIAGNOSIS — R94.6 BORDERLINE ABNORMAL THYROID FUNCTION TEST: ICD-10-CM

## 2023-12-10 RX ORDER — LEVOTHYROXINE SODIUM 0.05 MG/1
TABLET ORAL
Qty: 90 TABLET | Refills: 0 | Status: SHIPPED | OUTPATIENT
Start: 2023-12-10

## 2023-12-15 ENCOUNTER — ANESTHESIA EVENT (OUTPATIENT)
Dept: ANESTHESIOLOGY | Facility: HOSPITAL | Age: 53
End: 2023-12-15

## 2023-12-15 ENCOUNTER — ANESTHESIA (OUTPATIENT)
Dept: ANESTHESIOLOGY | Facility: HOSPITAL | Age: 53
End: 2023-12-15

## 2023-12-20 DIAGNOSIS — K20.90 ESOPHAGITIS: ICD-10-CM

## 2023-12-20 RX ORDER — SUCRALFATE ORAL 1 G/10ML
SUSPENSION ORAL
Qty: 300 ML | Refills: 1 | Status: SHIPPED | OUTPATIENT
Start: 2023-12-20 | End: 2023-12-26 | Stop reason: HOSPADM

## 2023-12-21 RX ORDER — SODIUM CHLORIDE 9 MG/ML
125 INJECTION, SOLUTION INTRAVENOUS CONTINUOUS
Status: CANCELLED | OUTPATIENT
Start: 2023-12-21

## 2023-12-26 ENCOUNTER — HOSPITAL ENCOUNTER (OUTPATIENT)
Dept: GASTROENTEROLOGY | Facility: MEDICAL CENTER | Age: 53
Setting detail: OUTPATIENT SURGERY
Discharge: HOME | End: 2023-12-26
Payer: COMMERCIAL

## 2023-12-26 ENCOUNTER — ANESTHESIA EVENT (OUTPATIENT)
Dept: GASTROENTEROLOGY | Facility: MEDICAL CENTER | Age: 53
End: 2023-12-26

## 2023-12-26 ENCOUNTER — ANESTHESIA (OUTPATIENT)
Dept: GASTROENTEROLOGY | Facility: MEDICAL CENTER | Age: 53
End: 2023-12-26

## 2023-12-26 VITALS
TEMPERATURE: 97.7 F | RESPIRATION RATE: 20 BRPM | SYSTOLIC BLOOD PRESSURE: 100 MMHG | BODY MASS INDEX: 33.32 KG/M2 | DIASTOLIC BLOOD PRESSURE: 57 MMHG | WEIGHT: 200 LBS | HEART RATE: 75 BPM | OXYGEN SATURATION: 99 % | HEIGHT: 65 IN

## 2023-12-26 DIAGNOSIS — K20.90 ESOPHAGITIS: ICD-10-CM

## 2023-12-26 DIAGNOSIS — K31.A0 INTESTINAL METAPLASIA OF STOMACH: ICD-10-CM

## 2023-12-26 PROCEDURE — 88305 TISSUE EXAM BY PATHOLOGIST: CPT | Performed by: PATHOLOGY

## 2023-12-26 PROCEDURE — 43239 EGD BIOPSY SINGLE/MULTIPLE: CPT | Performed by: INTERNAL MEDICINE

## 2023-12-26 RX ORDER — OMEPRAZOLE 20 MG/1
20 CAPSULE, DELAYED RELEASE ORAL DAILY
Qty: 30 CAPSULE | Refills: 2 | Status: SHIPPED | OUTPATIENT
Start: 2023-12-26 | End: 2024-03-25

## 2023-12-26 RX ORDER — SODIUM CHLORIDE 9 MG/ML
125 INJECTION, SOLUTION INTRAVENOUS CONTINUOUS
Status: DISCONTINUED | OUTPATIENT
Start: 2023-12-26 | End: 2023-12-30 | Stop reason: HOSPADM

## 2023-12-26 RX ORDER — PROPOFOL 10 MG/ML
INJECTION, EMULSION INTRAVENOUS AS NEEDED
Status: DISCONTINUED | OUTPATIENT
Start: 2023-12-26 | End: 2023-12-26

## 2023-12-26 RX ORDER — LIDOCAINE HYDROCHLORIDE 20 MG/ML
INJECTION, SOLUTION EPIDURAL; INFILTRATION; INTRACAUDAL; PERINEURAL AS NEEDED
Status: DISCONTINUED | OUTPATIENT
Start: 2023-12-26 | End: 2023-12-26

## 2023-12-26 RX ADMIN — PROPOFOL 50 MG: 10 INJECTION, EMULSION INTRAVENOUS at 10:12

## 2023-12-26 RX ADMIN — PROPOFOL 50 MG: 10 INJECTION, EMULSION INTRAVENOUS at 10:08

## 2023-12-26 RX ADMIN — PROPOFOL 100 MG: 10 INJECTION, EMULSION INTRAVENOUS at 10:07

## 2023-12-26 RX ADMIN — LIDOCAINE HYDROCHLORIDE 100 MG: 20 INJECTION, SOLUTION EPIDURAL; INFILTRATION; INTRACAUDAL at 10:07

## 2023-12-26 RX ADMIN — PROPOFOL 50 MG: 10 INJECTION, EMULSION INTRAVENOUS at 10:10

## 2023-12-26 RX ADMIN — SODIUM CHLORIDE 125 ML/HR: 0.9 INJECTION, SOLUTION INTRAVENOUS at 09:25

## 2023-12-26 NOTE — ANESTHESIA PREPROCEDURE EVALUATION
Procedure:  EGD    Relevant Problems   CARDIO   (+) Chest pain syndrome   (+) Migraines   (+) Mixed hyperlipidemia      ENDO   (+) Hypothyroidism      NEURO/PSYCH   (+) Anxiety   (+) Migraines        Physical Exam    Airway      TM Distance: >3 FB  Neck ROM: full     Dental       Cardiovascular  Rhythm: regular, Rate: normal, Cardiovascular exam normal    Pulmonary  Pulmonary exam normal Breath sounds clear to auscultation    Other Findings  post-pubertal.      Anesthesia Plan  ASA Score- 2     Anesthesia Type- IV sedation with anesthesia with ASA Monitors.         Additional Monitors:     Airway Plan:            Plan Factors-Exercise tolerance (METS): >4 METS.    Chart reviewed.   Existing labs reviewed.     Patient is not a current smoker.      Obstructive sleep apnea risk education given perioperatively.        Induction- intravenous.    Postoperative Plan-     Informed Consent- Anesthetic plan and risks discussed with patient.

## 2023-12-26 NOTE — ANESTHESIA POSTPROCEDURE EVALUATION
"Post-Op Assessment Note    CV Status:  Stable  Pain Score: 0    Pain management: adequate       Mental Status:  Alert and awake   Hydration Status:  Euvolemic and stable   PONV Controlled:  Controlled   Airway Patency:  Patent     Post Op Vitals Reviewed: Yes      Staff: Anesthesiologist               BP      Temp      Pulse     Resp      SpO2      /57   Pulse 75   Temp 97.7 °F (36.5 °C) (Temporal)   Resp 20   Ht 5' 5\" (1.651 m)   Wt 90.7 kg (200 lb)   SpO2 99%   BMI 33.28 kg/m²     "

## 2023-12-26 NOTE — INTERVAL H&P NOTE
H&P reviewed. After examining the patient I find no changes in the patients condition since the H&P had been written.    Vitals:    12/26/23 0924   BP: 113/55   Pulse: 68   Resp: 20   Temp: 97.7 °F (36.5 °C)   SpO2: 100%

## 2023-12-27 LAB
T4 FREE SERPL-MCNC: 1.1 NG/DL (ref 0.8–1.8)
TSH SERPL-ACNC: 2.16 MIU/L

## 2023-12-28 ENCOUNTER — TELEPHONE (OUTPATIENT)
Dept: FAMILY MEDICINE CLINIC | Facility: CLINIC | Age: 53
End: 2023-12-28

## 2023-12-28 DIAGNOSIS — E03.9 HYPOTHYROIDISM, UNSPECIFIED TYPE: Primary | ICD-10-CM

## 2023-12-28 DIAGNOSIS — R94.6 BORDERLINE ABNORMAL THYROID FUNCTION TEST: ICD-10-CM

## 2023-12-28 RX ORDER — LEVOTHYROXINE SODIUM 0.07 MG/1
75 TABLET ORAL DAILY
Qty: 90 TABLET | Refills: 0 | Status: SHIPPED | OUTPATIENT
Start: 2023-12-28

## 2023-12-28 NOTE — TELEPHONE ENCOUNTER
Talked with patient at request of Dr. Flores -- explained thyroid #'s and they way they work.    She would like to increase to 75mcg and recheck lab levels in 3 months at [facility]  -- Dr. Flores agrees to this.    [facility] order TSH, FT4  Rx to [facility] [place]

## 2023-12-28 NOTE — TELEPHONE ENCOUNTER
Pt wanted to talk about thyroid September numbers were higher than now. Numbers were supposed to be higher now per Dr. Reynolds.

## 2023-12-28 NOTE — TELEPHONE ENCOUNTER
----- Message from Dandre Flores MD sent at 12/28/2023  6:29 AM EST -----  Thyroid good  ----- Message -----  From: Malaika Wisdom Lab Results In  Sent: 12/27/2023  11:45 PM EST  To: Dandre Flores MD

## 2024-01-02 PROCEDURE — 88305 TISSUE EXAM BY PATHOLOGIST: CPT | Performed by: PATHOLOGY

## 2024-01-03 ENCOUNTER — OFFICE VISIT (OUTPATIENT)
Dept: GASTROENTEROLOGY | Facility: CLINIC | Age: 54
End: 2024-01-03
Payer: COMMERCIAL

## 2024-01-03 VITALS
HEART RATE: 64 BPM | BODY MASS INDEX: 33.99 KG/M2 | SYSTOLIC BLOOD PRESSURE: 130 MMHG | HEIGHT: 65 IN | WEIGHT: 204 LBS | DIASTOLIC BLOOD PRESSURE: 82 MMHG

## 2024-01-03 DIAGNOSIS — K44.9 HIATAL HERNIA: ICD-10-CM

## 2024-01-03 DIAGNOSIS — E66.9 CLASS 1 OBESITY WITHOUT SERIOUS COMORBIDITY WITH BODY MASS INDEX (BMI) OF 33.0 TO 33.9 IN ADULT, UNSPECIFIED OBESITY TYPE: ICD-10-CM

## 2024-01-03 DIAGNOSIS — K21.9 GASTROESOPHAGEAL REFLUX DISEASE, UNSPECIFIED WHETHER ESOPHAGITIS PRESENT: Primary | ICD-10-CM

## 2024-01-03 DIAGNOSIS — K22.70 BARRETT'S ESOPHAGUS WITHOUT DYSPLASIA: ICD-10-CM

## 2024-01-03 PROCEDURE — 99214 OFFICE O/P EST MOD 30 MIN: CPT | Performed by: INTERNAL MEDICINE

## 2024-01-03 NOTE — PROGRESS NOTES
[facility] - Outpatient Consultation  Cindy Gilbert 53 y.o. female MRN: [id number]  Encounter: [id number]          ASSESSMENT AND PLAN:      1. Gastroesophageal reflux disease, unspecified whether esophagitis present  2. Saavedra's esophagus without dysplasia  3. Hiatal hernia  4. Obesity    53-year-old female presenting for follow-up after recent EGD     That she should remain on at least once daily PPI if she is able to tolerate this both for management of significant GERD as well as prevention of esophagitis, and protective for history of Saavedra's esophagus.  She will resume omeprazole daily today.  Counseled on appropriate use 30 to 60 minutes before a meal.  If needed, this can be bumped to twice daily if she has incomplete symptom control.    From GI perspective, Acceptable for trial of GLP-1 agonist, understanding symptoms of heartburn may recur and she may not tolerate. I have provided a referral to weight management if she wishes to choose this option, otherwise can discuss with PCP    Will reevaluate how she is doing in 3 months time, if she has significant symptom improvement with adequate weight loss, will continue with current medical management of GERD, Saavedra's, and hiatal hernia.  However if she has no improvement, or any difficulties in taking chronic PPI otherwise, we will discuss the necessity for referral to thoracic surgery for consideration of hernia repair/antireflux surgery.        ______________________________________________________________________    HPI:  Cindy Gilbert is a pleasant 54 y/o female presenting for follow-up after recent EGD for follow up of grade C esophagitis     For follow-up EGD 12/26/2023 for assessment of healing of esophagitis.  Esophagitis had healed but she did have C3M3 segment of Saavedra's esophagus.  3 cm hiatal hernia noted.  Biopsies confirmed Saavedra's esophagus without dysplasia.    She previously was unable to tolerate Nexium  thus on day of procedure she was switched to omeprazole.  She has not been taking this consistently as she is concerned about long-term use of PPI.  However she had rebound symptoms of significant heartburn, dysphagia, reflux thus she will be planning to start this more consistently.  She expresses interest about considering GLP-1 agonist for purposes of weight loss.    Initial EGD demonstrated grade C esophagitis, 4 cm hiatal hernia.  Gastric biopsies demonstrated focal intestinal metaplasia without dysplasia.  Colonoscopy revealed 1 subcentimeter polyp and left-sided diverticular disease.     Summary of HPI from 9/27/2023 :  GI symptoms ongoing for prolonged period of time, recently worsened. She had a DVT leading to pulmonary embolism diagnosed in September 2022 thought to be related to prolonged travel.  She was on anticoagulation but this has now been discontinued.  During her CT PE studies there was noted colonic diverticulosis as well as small hiatal hernia.   She does not move her bowels regularly, although she does have some stool output roughly 5 days/week, she never feels like she is completely evacuating.  She denies blood in the stool.  No family history of colorectal cancer or other abnormalities in the colon.    She had negative Cologuard in 2022.      REVIEW OF SYSTEMS:    CONSTITUTIONAL: Denies any fever, chills, rigors, and weight loss.  HEENT: Denies odynophagia, tinnitus  CARDIOVASCULAR: No chest pain or palpitations.   RESPIRATORY: Denies any cough, hemoptysis, shortness of breath or dyspnea on exertion.  GASTROINTESTINAL: As noted in the History of Present Illness.   GENITOURINARY: No problems with urination. Denies any hematuria or dysuria.  NEUROLOGIC: No dizziness or vertigo, denies headaches.   MUSCULOSKELETAL: Denies any muscle or joint pain.   SKIN: Denies skin rashes or itching.   ENDOCRINE:  Denies intolerance to heat or cold.  PSYCHOSOCIAL: Denies depression or anxiety. Denies any  recent memory loss.       Historical Information   Past Medical History:   Diagnosis Date   • Allergic     as long as i can remember dust pollen etc   • Anemia     i can tend to be anemic i control through diet & supplementn   • Anxiety     as long as i can remember   • DVT, lower extremity, distal, acute, right (HCC) 2022   • GERD (gastroesophageal reflux disease)     i have it on occasion   • GERD (gastroesophageal reflux disease)     i have it on occasion   • Headache(784.0)     i get tension headaches alot   • Pulmonary embolism (HCC)    • Thrombosis     shallow vein R leg     Past Surgical History:   Procedure Laterality Date   •  SECTION  2005    2nd son's birth   • COLONOSCOPY     • TUBAL LIGATION      after second son was born in    • UPPER GASTROINTESTINAL ENDOSCOPY       Social History   Social History     Substance and Sexual Activity   Alcohol Use Not Currently     Social History     Substance and Sexual Activity   Drug Use Never     Social History     Tobacco Use   Smoking Status Former   • Current packs/day: 0.00   • Average packs/day: 0.3 packs/day for 30.0 years (7.5 ttl pk-yrs)   • Types: Cigarettes, E-Cigarettes   • Start date:    • Quit date: 2022   • Years since quittin.3   Smokeless Tobacco Never   Tobacco Comments    havent smoked in a long time     Family History   Problem Relation Age of Onset   • Hypertension Father    • Breast cancer Paternal Grandmother    • Diabetes Paternal Grandmother    • Substance Abuse Neg Hx    • Mental illness Neg Hx        Meds/Allergies       Current Outpatient Medications:   •  Acetaminophen 500 MG  •  albuterol (Proventil HFA) 90 mcg/act inhaler  •  ALPRAZolam (XANAX) 0.5 mg tablet  •  aspirin (ECOTRIN LOW STRENGTH) 81 mg EC tablet  •  clobetasol (TEMOVATE) 0.05 % ointment  •  dicyclomine (BENTYL) 20 mg tablet  •  levothyroxine (Euthyrox) 75 mcg tablet  •  omeprazole (PriLOSEC) 20 mg delayed release capsule  •  RA  "Acetaminophen Ex St 500 MG tablet  •  benzonatate (TESSALON) 200 MG capsule  •  fluticasone (FLONASE) 50 mcg/act nasal spray    Allergies   Allergen Reactions   • Iodine I 131 Tositumomab Rash     OK for CT per Dr Alcantar   • Doxycycline GI Intolerance and Vomiting   • Latex    • Other Hives           Objective     Blood pressure 130/82, pulse 64, height 5' 5\" (1.651 m), weight 92.5 kg (204 lb). Body mass index is 33.95 kg/m².        PHYSICAL EXAM:      General Appearance:   Alert, cooperative, no distress   HEENT:   Normocephalic, atraumatic, anicteric.     Neck:  Supple, symmetrical, trachea midline   Lungs:   Clear to auscultation bilaterally; no rales, rhonchi or wheezing; respirations unlabored    Heart::   Regular rate and rhythm; no murmur, rub, or gallop.   Abdomen:   Soft, non-tender, non-distended; normal bowel sounds; no masses, no organomegaly    Genitalia:   Deferred    Rectal:   Deferred    Extremities:  No cyanosis, clubbing or edema    Pulses:  2+ and symmetric    Skin:  No jaundice, rashes, or lesions          Lab Results:   No visits with results within 1 Day(s) from this visit.   Latest known visit with results is:   Hospital Outpatient Visit on 12/26/2023   Component Date Value   • Case Report 12/26/2023                      Value:Surgical Pathology Report                         Case: [id number]                                   Authorizing Provider:  Candace Baptiste DO    Collected:           12/26/2023 1010              Ordering Location:     [facility]        Received:            12/26/2023 [address] [place] [address] Endoscopy                                                     Pathologist:           Malu Woody MD                                                       Specimens:   A) - Stomach, antrum bx's hx intestinal metaplasia                                                  B) - Stomach, fundus bx's hx intestinal metaplasia                "                                   C) - Stomach, incisura bx's hx instestinal metaplasia                                               D) - Stomach, greater curvature bx's hx instestinal metaplasia                                      E) - Stomach, lesser curvature bx's hx instestinal metaplasia                                                                 F) - Esophagus, bx's 34cm r/o barretts                                                              G) - Esophagus, bx's 32cm r/o barretts                                                              H) - Esophagus, bx's 30cm r/o barretts                                                    • Final Diagnosis 12/26/2023                      Value:This result contains rich text formatting which cannot be displayed here.   • Additional Information 12/26/2023                      Value:This result contains rich text formatting which cannot be displayed here.   • Gross Description 12/26/2023                      Value:This result contains rich text formatting which cannot be displayed here.         Radiology Results:   EGD    Result Date: 12/26/2023  Narrative: Table formatting from the original result was not included. [facility] [address] [place] [address] [phone] DATE OF SERVICE: 12/26/23 PHYSICIAN(S): Attending: Candace Baptiste DO Fellow: No Staff Documented INDICATION: Esophagitis, Intestinal metaplasia of stomach POST-OP DIAGNOSIS: See the impression below. PREPROCEDURE: Informed consent was obtained for the procedure, including sedation.  Risks of perforation, hemorrhage, adverse drug reaction and aspiration were discussed. The patient was placed in the left lateral decubitus position. Patient was explained about the risks and benefits of the procedure. Risks including but not limited to bleeding, infection, and perforation were explained in detail. Also explained about less than 100% sensitivity with the exam  and other alternatives. PROCEDURE: EGD DETAILS OF PROCEDURE: Patient was taken to the procedure room where a time out was performed to confirm correct patient and correct procedure. The patient underwent monitored anesthesia care, which was administered by an anesthesia professional. The patient's blood pressure, heart rate, level of consciousness, respirations and oxygen were monitored throughout the procedure. The scope was advanced to the second part of the duodenum. Retroflexion was performed in the fundus. The patient experienced no blood loss. The procedure was not difficult. The patient tolerated the procedure well. There were no apparent adverse events. ANESTHESIA INFORMATION: ASA: II Anesthesia Type: IV Sedation with Anesthesia MEDICATIONS: sodium chloride 0.9 % infusion 700 mL*  *From user-documented volume (Totals for administrations occurring from 1006 to 1018 on 12/26/23) FINDINGS: C3 Saavedra's esophagus observed with no associated lesion. The Z-line was 30 cm from the incisors and the top of gastric folds was 33 cm from the incisors; performed cold forceps biopsy 3 cm sliding hiatal hernia (type I hiatal hernia) - GE junction 33 cm from the incisors, diaphragmatic impression 36 cm from the incisors:  Hill classification: Grade II Erythematous mucosa with erosion in the antrum; performed cold forceps biopsy for dysplasia screening The duodenum appeared normal. SPECIMENS: ID Type Source Tests Collected by Time Destination 1 : antrum bx's hx intestinal metaplasia Tissue Stomach TISSUE EXAM Candace Baptiste,  12/26/2023 10:10 AM  2 : fundus bx's hx intestinal metaplasia Tissue Stomach TISSUE EXAM Candace Baptiste, DO 12/26/2023 10:10 AM  3 : incisura bx's hx instestinal metaplasia Tissue Stomach TISSUE EXAM Candace Baptiste, DO 12/26/2023 10:11 AM  4 : greater curvature bx's hx instestinal metaplasia Tissue Stomach TISSUE EXAM Candace Baptiste, DO 12/26/2023 10:13 AM  5 : lesser curvature  bx's hx instestinal metaplasia Tissue Stomach TISSUE EXAM Candace MONTOYA Oliva,  12/26/2023 10:13 AM  6 : bx's 34cm r/o barretts Tissue Esophagus TISSUE EXAM Candace MONTOYA Oliva, DO 12/26/2023 10:15 AM  7 : bx's 32cm r/o barretts Tissue Esophagus TISSUE EXAM Candace MONTOYA Oliva, DO 12/26/2023 10:15 AM  8 : bx's 30cm r/o barretts Tissue Esophagus TISSUE EXAM Candace MONTOYA Oliva, DO 12/26/2023 10:15 AM      Impression: C3 Saavedra's esophagus; performed cold forceps biopsy 3 cm type I hiatal hernia Erythematous mucosa with erosion in the antrum; performed cold forceps biopsy The duodenum appeared normal. RECOMMENDATION:  Await pathology results Change PPI to omeprazole 20 mg daily given side effects to Nexium; if recurrence of GERD, increase to BID Follow up in GI office for ongoing care   Candace Baptiste DO   Answers submitted by the patient for this visit:  Abdominal Pain Questionnaire (Submitted on 12/29/2023)  Chief Complaint: Abdominal pain  Chronicity: chronic  Onset: more than 1 year ago  Onset quality: undetermined  Frequency: daily  Episode duration: 12 Months  Progression since onset: waxing and waning  Pain location: generalized abdominal region  Pain - numeric: 7/10  Pain quality: aching, burning, cramping, a sensation of fullness, sharp, tearing  Radiates to: LLQ, epigastric region, periumbilical region, chest, back, left flank  anorexia: No  arthralgias: No  belching: No  constipation: No  diarrhea: No  dysuria: No  fever: No  flatus: No  frequency: No  headaches: No  hematochezia: No  hematuria: No  melena: No  myalgias: No  nausea: No  weight loss: No  vomiting: No  Aggravated by: nothing  Relieved by: nothing, being still, bowel movements, certain positions, movement, passing flatus, recumbency, sitting up  Diagnostic workup: GI consult, upper endoscopy

## 2024-01-08 ENCOUNTER — TELEPHONE (OUTPATIENT)
Dept: FAMILY MEDICINE CLINIC | Facility: CLINIC | Age: 54
End: 2024-01-08

## 2024-01-08 ENCOUNTER — TELEPHONE (OUTPATIENT)
Age: 54
End: 2024-01-08

## 2024-01-08 NOTE — TELEPHONE ENCOUNTER
Patients GI provider:  Dr. Baptiste     Number to return call: [phone]    Reason for call: Pt calling stating she is upset due to a weigh loss medication that she and Dr. Baptiste discuss and she was advise that she was referred to a weight management to get the medication. Requesting to speak with someone to discuss and get some clarification    Scheduled procedure/appointment date if applicable: Apt/procedure

## 2024-01-08 NOTE — TELEPHONE ENCOUNTER
Patient called while I was on lunch-does not wish to have a call back: got in with weight management per Gi team.       Patient called back and is aware that EMA Valles will not prescribe weight loss medication, states that the GI Dr stated that her PCP would and doesn't understand why she is getting push back now.   Advised patient that another doctor can not dictate what another provider prescirbes, patient states she is very angry.   Advised patient I would send message to Ruth for more in depth reasoning     Please advise to contact patient.

## 2024-01-08 NOTE — TELEPHONE ENCOUNTER
Pt called back - she got in w/ weight management - so she is good - does not want or need a CB from  Dr Baptiste/ our office. Apologized for being upset earlier.

## 2024-01-09 ENCOUNTER — CONSULT (OUTPATIENT)
Dept: BARIATRICS | Facility: CLINIC | Age: 54
End: 2024-01-09
Payer: COMMERCIAL

## 2024-01-09 VITALS
WEIGHT: 203.2 LBS | HEIGHT: 65 IN | BODY MASS INDEX: 33.85 KG/M2 | SYSTOLIC BLOOD PRESSURE: 100 MMHG | DIASTOLIC BLOOD PRESSURE: 60 MMHG | HEART RATE: 88 BPM

## 2024-01-09 DIAGNOSIS — Z13.1 SCREENING FOR DIABETES MELLITUS: ICD-10-CM

## 2024-01-09 DIAGNOSIS — E03.9 HYPOTHYROIDISM, UNSPECIFIED TYPE: ICD-10-CM

## 2024-01-09 DIAGNOSIS — E66.9 CLASS 1 OBESITY WITHOUT SERIOUS COMORBIDITY WITH BODY MASS INDEX (BMI) OF 33.0 TO 33.9 IN ADULT, UNSPECIFIED OBESITY TYPE: ICD-10-CM

## 2024-01-09 DIAGNOSIS — E66.9 OBESITY, CLASS I, BMI 30-34.9: Primary | ICD-10-CM

## 2024-01-09 PROBLEM — E66.811 OBESITY, CLASS I, BMI 30-34.9: Status: ACTIVE | Noted: 2024-01-09

## 2024-01-09 PROCEDURE — 99204 OFFICE O/P NEW MOD 45 MIN: CPT | Performed by: NURSE PRACTITIONER

## 2024-01-09 RX ORDER — TIRZEPATIDE 2.5 MG/.5ML
2.5 INJECTION, SOLUTION SUBCUTANEOUS WEEKLY
Qty: 2 ML | Refills: 0 | Status: SHIPPED | OUTPATIENT
Start: 2024-01-09 | End: 2024-01-10 | Stop reason: SDUPTHER

## 2024-01-09 NOTE — PROGRESS NOTES
Assessment/Plan:    Obesity, Class I, BMI 30-34.9  - Discussed options of HealthyCORE-Intensive Lifestyle Intervention Program, Very Low Calorie Diet-VLCD, and Conservative Program and the role of weight loss medications.  - Explained the importance of making lifestyle changes with anti-obesity medications.  - Patient is interested in pursuing Conservative Program  - Initial weight loss goal of 5-10% weight loss for improved health  - Weight loss can improve patient's co-morbid conditions and/or prevent weight-related complications.  - Stop Bang 2/8  - S/P tubal ligation.   - Interested in starting weight loss medication to help with appetite and cravings.   - Not interested in phentermine due to concern for side effects and will avoid this due to taking PRN xanax and could worsen anxiety.   - Patient denies personal history of pancreatitis. Patient also denies personal and family history of thyroid cancer and multiple endocrine neoplasia type 2 (MEN 2 tumor).   - She made an informed decision to start Zepbound 2.5 mg weekly. After you have taken the second pen, please give me an update, as we will likely increase the dose the next month if you are tolerating it well.  - Side effects of Zepbound include nausea, vomiting, diarrhea, or constipation. Keep an eye on your heart rate while on Zepbound. If you resting heart rate is greater than 100 beats per minutes, please notify me. If you develop severe abdominal pain, stop Zepbound and go to the emergency room, as that could be a sign of pancreatitis.   - Please notify me if you have surgery, upper endoscopy, or colonoscopy scheduled, as we typically hold Zepbound for one week prior to the procedure.     - Labs reviewed: TSH and free T4 12/27/2023. Lipid and CMP 9/15/2023. HDL low, which will likely improve with weight loss and cardiovascular exercise. Remainder of the blood work within acceptable range.   - Check A1C and fasting insulin.       Goals:  Do not skip  meals.  Food log (ie.) www.[employer].com,sparkpeople.com,loseit.com,[employer].com,etc. baritastic (use skinnytaste.com, [employer] or smartphone arnie [employer] for recipes)  No sugary beverages. At least 64oz of water daily.  Increase physical activity by 10 minutes daily. Gradually increase physical activity to a goal of 5 days per week for 30 minutes of MODERATE intensity PLUS 2 days per week of FULL BODY resistance training (use smartphone apps [employer], Home Workout, etc.)  Start food logging, weighing and measuring food.   1100 calories per day. Sample menu given.   Keep up the great work with water intake, at least 64 oz daily.   Change whole milk to 2%.  Keep up the great work with walking with goal of at least 5 days per week for 30 minutes cardiovascular exercise and add in 2 days per week resistance training.   Start Zepbound.    Hypothyroidism  - Taking levothyroxine. Continue management with prescribing provider.          Cindy was seen today for consult.    Diagnoses and all orders for this visit:    Obesity, Class I, BMI 30-34.9  -     tirzepatide (Zepbound) 2.5 mg/0.5 mL auto-injector; Inject 0.5 mL (2.5 mg total) under the skin once a week for 28 days  -     HEMOGLOBIN A1C W/ EAG ESTIMATION; Future  -     Insulin, fasting; Future    Class 1 obesity without serious comorbidity with body mass index (BMI) of 33.0 to 33.9 in adult, unspecified obesity type  -     Ambulatory Referral to Weight Management    Hypothyroidism, unspecified type  -     tirzepatide (Zepbound) 2.5 mg/0.5 mL auto-injector; Inject 0.5 mL (2.5 mg total) under the skin once a week for 28 days  -     HEMOGLOBIN A1C W/ EAG ESTIMATION; Future  -     Insulin, fasting; Future    Screening for diabetes mellitus  -     HEMOGLOBIN A1C W/ EAG ESTIMATION; Future  -     Insulin, fasting; Future        Total time spent: 45 min, with >50% face-to-face time spent counseling patient on nonsurgical interventions for the treatment of excess  weight. Discussed in detail nonsurgical options including intensive lifestyle intervention program, very low-calorie diet program and conservative program.  Discussed the role of weight loss medications.  Counseled patient on diet behavior and exercise modification for weight loss.        Follow up in approximately  2 month nurse visit and 4 months  with Non-Surgical Physician/Advanced Practitioner.    Subjective:   Chief Complaint   Patient presents with    Consult     MWM- Consult, Goal Wt 145lb, Waist 38in- Stop Bang        Patient ID: Cindy Gilbert  is a 53 y.o. female with excess weight/obesity here to pursue weight management.  Previous notes and records have been reviewed.    Past Medical History:   Diagnosis Date    Allergic     as long as i can remember dust pollen etc    Anemia     i can tend to be anemic i control through diet & supplementn    Anxiety     as long as i can remember    DVT, lower extremity, distal, acute, right (HCC) 2022    GERD (gastroesophageal reflux disease)     i have it on occasion    GERD (gastroesophageal reflux disease)     i have it on occasion    Headache(784.0)     i get tension headaches alot    Pulmonary embolism (HCC)     Thrombosis     shallow vein R leg     Past Surgical History:   Procedure Laterality Date     SECTION  2005    2nd son's birth    COLONOSCOPY      TUBAL LIGATION      after second son was born in     UPPER GASTROINTESTINAL ENDOSCOPY         HPI:  Wt Readings from Last 20 Encounters:   24 92.2 kg (203 lb 3.2 oz)   24 92.5 kg (204 lb)   23 90.7 kg (200 lb)   23 92.1 kg (203 lb)   10/24/23 89.8 kg (198 lb)   10/23/23 92.1 kg (203 lb)   23 92.1 kg (203 lb)   23 92.1 kg (203 lb)   23 90.7 kg (200 lb)   08/15/23 95.3 kg (210 lb)   23 95.2 kg (209 lb 12.8 oz)   23 95.7 kg (211 lb)   07/10/23 95.3 kg (210 lb)   23 98 kg (216 lb)   23 99.8 kg (220 lb)   23 98 kg (216  lb)   23 98 kg (216 lb)   23 98.9 kg (218 lb)   23 98.9 kg (218 lb)   22 98.9 kg (218 lb)     Obesity/Excess Weight:  Severity: Mild  Onset:  past few years     Modifiers: Diet and Exercise, Commercial Weight Loss Programs-ie. Weight Watchers, Marquita Omar, Nutrisystem, etc., and [employer]  Contributing factors: Menopause and medications for PE  Associated symptoms: comorbid conditions and increased shortness of breath      Appetite up and down. Having cravings for sugar and carbs. Interested in starting medication to help with that.      Insurance covers Zepbound. Number for prior auth [id number].    Prescribed phentermine, but never started it due to concern for side effects.     Hydration: 80 oz water, 2 cup coffee and 1 decaf at night with whole milk and splenda, occ soda   Alcohol: none  Smoking: denies  Exercise: walks dogs 5 days per week for 10 minutes to 1.5 hours  Occupation: works in doctor's office   Sleep: 6-10 hours  STOP ban/8    Highest weight: 220's lbs  Current weight: 203.1 lbs BMI 34.34  Goal weight: 145 lbs    Colonoscopy: UTD, due   Mammogram: UTD, due May 2024    The following portions of the patient's history were reviewed and updated as appropriate: allergies, current medications, past family history, past medical history, past social history, past surgical history, and problem list.    Family History   Problem Relation Age of Onset    Other Mother         brain tumor    Other Father         lung mass    Hypertension Father     Breast cancer Paternal Grandmother     Diabetes Paternal Grandmother     Substance Abuse Neg Hx     Mental illness Neg Hx         Review of Systems   HENT:  Negative for sore throat.    Respiratory:  Positive for shortness of breath (related to GERD). Negative for cough.    Cardiovascular:  Negative for chest pain and palpitations.   Gastrointestinal:  Positive for abdominal pain (GI aware, intermittent). Negative for constipation,  "diarrhea, nausea and vomiting.        + GERD controlled with medication   Endocrine: Positive for cold intolerance (intermittent) and heat intolerance (intermittent).   Genitourinary:  Negative for dysuria.   Musculoskeletal:  Positive for arthralgias (chronic). Negative for back pain.   Skin:  Negative for rash.   Neurological:  Negative for headaches.   Psychiatric/Behavioral:  Negative for suicidal ideas (or HI).         Denies depression and anxiety       Objective:  /60   Pulse 88   Ht 5' 4.5\" (1.638 m)   Wt 92.2 kg (203 lb 3.2 oz)   BMI 34.34 kg/m²     Physical Exam  Vitals and nursing note reviewed.          Constitutional   General appearance: Abnormal.  well developed and obese.   Eyes No conjunctival injection.   Ears, Nose, Mouth, and Throat Oral mucosa moist.   Pulmonary   Respiratory effort: No increased work of breathing or signs of respiratory distress.     Cardiovascular     Examination of extremities for edema and/or varicosities: Normal.  no edema.   Abdomen   Abdomen: Abnormal.  The abdomen was obese.    Musculoskeletal   Normal range of motion  Neurological   Gait and station: Normal.    Psychiatric   Orientation to person, place and time: Normal.    Affect: appropriate     "

## 2024-01-09 NOTE — PATIENT INSTRUCTIONS
- Start Zepbound 2.5 mg subcutaneously weekly. After you have taken the second pen, please give me an update, as we will likely increase the dose the next month if you are tolerating it well.    - Side effects of Zepbound include nausea, vomiting, diarrhea, or constipation. Keep an eye on your heart rate while on Zepbound. If you resting heart rate is greater than 100 beats per minutes, please notify me. If you develop severe abdominal pain, stop Zepbound and go to the emergency room, as that could be a sign of pancreatitis.     - Please notify me if you have surgery, upper endoscopy, or colonoscopy scheduled, as we typically hold Zepbound for one week prior to the procedure.

## 2024-01-10 ENCOUNTER — TELEPHONE (OUTPATIENT)
Dept: BARIATRICS | Facility: CLINIC | Age: 54
End: 2024-01-10

## 2024-01-10 DIAGNOSIS — E66.9 OBESITY, CLASS I, BMI 30-34.9: ICD-10-CM

## 2024-01-10 DIAGNOSIS — E03.9 HYPOTHYROIDISM, UNSPECIFIED TYPE: ICD-10-CM

## 2024-01-10 RX ORDER — TIRZEPATIDE 2.5 MG/.5ML
2.5 INJECTION, SOLUTION SUBCUTANEOUS WEEKLY
Qty: 2 ML | Refills: 0 | Status: SHIPPED | OUTPATIENT
Start: 2024-01-10 | End: 2024-02-07

## 2024-01-10 NOTE — TELEPHONE ENCOUNTER
Patient wanting her script for zepbound to be resend to [facility] with the Days of supplies for 35 days. Patient states that mail order will not send her the full month with amount ordered previously.

## 2024-01-10 NOTE — ASSESSMENT & PLAN NOTE
- Discussed options of HealthyCORE-Intensive Lifestyle Intervention Program, Very Low Calorie Diet-VLCD, and Conservative Program and the role of weight loss medications.  - Explained the importance of making lifestyle changes with anti-obesity medications.  - Patient is interested in pursuing Conservative Program  - Initial weight loss goal of 5-10% weight loss for improved health  - Weight loss can improve patient's co-morbid conditions and/or prevent weight-related complications.  - Stop Bang 2/8  - S/P tubal ligation.   - Interested in starting weight loss medication to help with appetite and cravings.   - Not interested in phentermine due to concern for side effects and will avoid this due to taking PRN xanax and could worsen anxiety.   - Patient denies personal history of pancreatitis. Patient also denies personal and family history of thyroid cancer and multiple endocrine neoplasia type 2 (MEN 2 tumor).   - She made an informed decision to start Zepbound 2.5 mg weekly. After you have taken the second pen, please give me an update, as we will likely increase the dose the next month if you are tolerating it well.  - Side effects of Zepbound include nausea, vomiting, diarrhea, or constipation. Keep an eye on your heart rate while on Zepbound. If you resting heart rate is greater than 100 beats per minutes, please notify me. If you develop severe abdominal pain, stop Zepbound and go to the emergency room, as that could be a sign of pancreatitis.   - Please notify me if you have surgery, upper endoscopy, or colonoscopy scheduled, as we typically hold Zepbound for one week prior to the procedure.     - Labs reviewed: TSH and free T4 12/27/2023. Lipid and CMP 9/15/2023. HDL low, which will likely improve with weight loss and cardiovascular exercise. Remainder of the blood work within acceptable range.   - Check A1C and fasting insulin.       Goals:  Do not skip meals.  Food log (ie.)  www.myfitnesspal.com,ScreachTVpeople.com,loseit.com,Wheeldo.com,etc. baritastic (use skinnytaste.com, Drug Response Dx or smartphone arnie SlideRocket for recipes)  No sugary beverages. At least 64oz of water daily.  Increase physical activity by 10 minutes daily. Gradually increase physical activity to a goal of 5 days per week for 30 minutes of MODERATE intensity PLUS 2 days per week of FULL BODY resistance training (use smartphone apps ThinkGrid, Home Workout, etc.)  Start food logging, weighing and measuring food.   1100 calories per day. Sample menu given.   Keep up the great work with water intake, at least 64 oz daily.   Change whole milk to 2%.  Keep up the great work with walking with goal of at least 5 days per week for 30 minutes cardiovascular exercise and add in 2 days per week resistance training.   Start Zepbound.

## 2024-01-10 NOTE — TELEPHONE ENCOUNTER
Patient called again stating not to send the script to [facility]. Patient states that she found  zepbound  at [facility] from [facility] on file already.

## 2024-01-11 ENCOUNTER — TELEPHONE (OUTPATIENT)
Dept: BARIATRICS | Facility: CLINIC | Age: 54
End: 2024-01-11

## 2024-01-11 LAB
COMMENT: NORMAL
EST. AVERAGE GLUCOSE BLD GHB EST-MCNC: 111 MG/DL
EST. AVERAGE GLUCOSE BLD GHB EST-SCNC: 6.2 MMOL/L
HBA1C MFR BLD: 5.5 % OF TOTAL HGB
INSULIN SERPL-ACNC: 7.3 UIU/ML

## 2024-01-11 NOTE — TELEPHONE ENCOUNTER
----- Message from EMA Foster sent at 1/11/2024  1:59 PM EST -----  Please let the patient know I reviewed her A1C and fasting insulin and they were both within acceptable range.

## 2024-01-24 ENCOUNTER — TELEPHONE (OUTPATIENT)
Dept: BARIATRICS | Facility: CLINIC | Age: 54
End: 2024-01-24

## 2024-01-24 DIAGNOSIS — E66.9 OBESITY, CLASS I, BMI 30-34.9: ICD-10-CM

## 2024-01-24 DIAGNOSIS — E03.9 HYPOTHYROIDISM, UNSPECIFIED TYPE: ICD-10-CM

## 2024-01-24 RX ORDER — TIRZEPATIDE 2.5 MG/.5ML
2.5 INJECTION, SOLUTION SUBCUTANEOUS WEEKLY
Qty: 2 ML | Refills: 0 | Status: SHIPPED | OUTPATIENT
Start: 2024-01-24 | End: 2024-02-21

## 2024-01-24 NOTE — TELEPHONE ENCOUNTER
Patient stating that she is going away on vacation and would like to stay on the same dose of her zepbound til she gets back in town. Patient requesting a refill of her zepbound to be sent to [facility] in [place].   0

## 2024-02-21 ENCOUNTER — TELEPHONE (OUTPATIENT)
Age: 54
End: 2024-02-21

## 2024-02-21 DIAGNOSIS — E03.9 HYPOTHYROIDISM, UNSPECIFIED TYPE: ICD-10-CM

## 2024-02-21 DIAGNOSIS — E66.9 OBESITY, CLASS I, BMI 30-34.9: ICD-10-CM

## 2024-02-21 RX ORDER — TIRZEPATIDE 5 MG/.5ML
5 INJECTION, SOLUTION SUBCUTANEOUS WEEKLY
Qty: 2 ML | Refills: 0 | Status: SHIPPED | OUTPATIENT
Start: 2024-02-21 | End: 2024-03-20

## 2024-02-21 RX ORDER — TIRZEPATIDE 2.5 MG/.5ML
2.5 INJECTION, SOLUTION SUBCUTANEOUS WEEKLY
Qty: 2 ML | Refills: 0 | Status: CANCELLED | OUTPATIENT
Start: 2024-02-21 | End: 2024-03-20

## 2024-02-21 NOTE — TELEPHONE ENCOUNTER
The message from the pod is unclear. Please verify with patient: Has she started the Zepbound 2.5 mg dose? If so, I can increase to 5 mg (she must complete 2.5 mg weekly for 4 weeks then increase to 5 mg).

## 2024-02-21 NOTE — TELEPHONE ENCOUNTER
Patient called the RX Refill Line. Message is being forwarded to the office.     Patient is requesting a refill for Zepound 5mg. She stated that her pharmacy does not have the 2.5mg in stock and would like her dose increased to the 5mg. This dose is not on her current medication list Please review.    Please contact patient at [phone]      [x] Refill   [] Prior Auth  [] Other:     Office:   [] PCP/Provider -   [x] Specialty/Provider -  Doris Melendez     Medication: tirzepatide (Zepbound)5mg/0.5 mL auto-injector     Dose/Frequency:  Inject 0.5 mL (5 mg total) under the skin once a week     Quantity: 2ml    Pharmacy: [facility] - [address] [facility]     Does the patient have enough for 3 days?   [] Yes   [x] No - Send as HP to POD

## 2024-02-21 NOTE — TELEPHONE ENCOUNTER
Zepbound 5 mg weekly submitted. She should give us another update after taking the second pen of 5 mg, as we may need to increase the dose the next month.

## 2024-02-21 NOTE — TELEPHONE ENCOUNTER
Reason for call:   [x] Refill   [] Prior Auth  [] Other:     Office:   [] PCP/Provider -   [x] Specialty/Provider - EMA Foster     Medication: Zepbound     Dose/Frequency: 2.5 mg/0.5 ml / inject 0.5 ml weekly.    Quantity: 2 ml    Pharmacy: [address] [facility] - [address]    Does the patient have enough for 3 days?   [] Yes   [x] No - Send as HP to POD

## 2024-02-21 NOTE — TELEPHONE ENCOUNTER
Patient calling in and asking to speak with Pooja in regards to upping her dosage on her zepbound

## 2024-03-11 ENCOUNTER — TELEPHONE (OUTPATIENT)
Age: 54
End: 2024-03-11

## 2024-03-11 ENCOUNTER — TELEPHONE (OUTPATIENT)
Dept: BARIATRICS | Facility: CLINIC | Age: 54
End: 2024-03-11

## 2024-03-11 ENCOUNTER — CLINICAL SUPPORT (OUTPATIENT)
Dept: BARIATRICS | Facility: CLINIC | Age: 54
End: 2024-03-11

## 2024-03-11 VITALS
SYSTOLIC BLOOD PRESSURE: 100 MMHG | WEIGHT: 181 LBS | DIASTOLIC BLOOD PRESSURE: 65 MMHG | HEART RATE: 94 BPM | BODY MASS INDEX: 30.9 KG/M2 | HEIGHT: 64 IN | TEMPERATURE: 98.5 F | RESPIRATION RATE: 16 BRPM

## 2024-03-11 DIAGNOSIS — R63.5 ABNORMAL WEIGHT GAIN: ICD-10-CM

## 2024-03-11 DIAGNOSIS — E66.9 OBESITY, CLASS I, BMI 30-34.9: Primary | ICD-10-CM

## 2024-03-11 LAB
T4 FREE SERPL-MCNC: 1.4 NG/DL (ref 0.8–1.8)
TSH SERPL-ACNC: 1.31 MIU/L

## 2024-03-11 PROCEDURE — RECHECK

## 2024-03-11 RX ORDER — TIRZEPATIDE 2.5 MG/.5ML
2.5 INJECTION, SOLUTION SUBCUTANEOUS WEEKLY
Qty: 2 ML | Refills: 0 | Status: SHIPPED | OUTPATIENT
Start: 2024-03-11 | End: 2024-03-15 | Stop reason: DRUGHIGH

## 2024-03-11 RX ORDER — TIRZEPATIDE 7.5 MG/.5ML
7.5 INJECTION, SOLUTION SUBCUTANEOUS WEEKLY
Qty: 2 ML | Refills: 0 | Status: SHIPPED | OUTPATIENT
Start: 2024-03-11 | End: 2024-03-11 | Stop reason: DRUGHIGH

## 2024-03-11 NOTE — TELEPHONE ENCOUNTER
Script was placed to increase the dose, as there was no notation in the note from the POD that the patient was having difficulty with Zepbound. I later reviewed the nurse visit and it it documented that she is having difficulty with the 5 mg dose and wants to go on 2.5. Script for 7.5 mg discontinued and script placed for 2.5.

## 2024-03-11 NOTE — TELEPHONE ENCOUNTER
Patient calling in and stating that her next injection for the wegovy is Wednesday 3/13 and she forgot tell Esther that at appt

## 2024-03-11 NOTE — PROGRESS NOTES
Patient last visit weight:203lb  Patient current visit weight:181    If you are taking phentermine or other oral weight loss medications, are you experiencing any of the following symptoms:  Headache:   Blurred Vision:   Chest Pain:   Palpitations:  Insomnia:   SPECIFY ORAL MEDICATION AND DOSAGE:     If you are taking an injectable medication,  are you experiencing any of the following symptoms:  Bloating:  No  Nausea: No  Vomiting:  No  Constipation:  No  Diarrhea: No  SPECIFY INJECTABLE MEDICATION AND CURRENT DOSAGE: Zepbound  Pt asking for dose decrese the 5mg making her sick, She wants to go back on 2.5mg , She will need the refill right away      Vitals:    Is BP less than 100/60? No  Is BP greater than 140/90?  Is HR greater than 100?  **If yes to any of the above, have patient relax and repeat in 5-10 minutes**    Repeat values:    Is BP less than 100/60?  Is BP greater than 140/90?  Is HR greater than 100?  **If values remain outside of ranges above, please consult provider for next steps**

## 2024-03-11 NOTE — TELEPHONE ENCOUNTER
Next dose of Zepbound 7.5 mg weekly sent to [facility] in [place]. She should message me after she takes the second pen with an update so I can send for the next dose if she is tolerating it well.

## 2024-03-12 ENCOUNTER — TELEPHONE (OUTPATIENT)
Age: 54
End: 2024-03-12

## 2024-03-12 ENCOUNTER — TELEPHONE (OUTPATIENT)
Dept: BARIATRICS | Facility: CLINIC | Age: 54
End: 2024-03-12

## 2024-03-12 ENCOUNTER — TELEPHONE (OUTPATIENT)
Dept: FAMILY MEDICINE CLINIC | Facility: CLINIC | Age: 54
End: 2024-03-12

## 2024-03-12 DIAGNOSIS — E03.9 HYPOTHYROIDISM, UNSPECIFIED TYPE: ICD-10-CM

## 2024-03-12 DIAGNOSIS — K20.90 ESOPHAGITIS: ICD-10-CM

## 2024-03-12 RX ORDER — LEVOTHYROXINE SODIUM 0.07 MG/1
75 TABLET ORAL DAILY
Qty: 90 TABLET | Refills: 3 | Status: SHIPPED | OUTPATIENT
Start: 2024-03-12

## 2024-03-12 RX ORDER — LEVOTHYROXINE SODIUM 0.07 MG/1
75 TABLET ORAL DAILY
Qty: 90 TABLET | Refills: 0 | OUTPATIENT
Start: 2024-03-12

## 2024-03-12 RX ORDER — OMEPRAZOLE 20 MG/1
20 CAPSULE, DELAYED RELEASE ORAL DAILY
Qty: 30 CAPSULE | Refills: 5 | Status: SHIPPED | OUTPATIENT
Start: 2024-03-12

## 2024-03-12 NOTE — TELEPHONE ENCOUNTER
Pt wanted to schedule appointment to discuss thyroid results in more detail. Tried calling [phone]. Phone call can't be completed.

## 2024-03-12 NOTE — TELEPHONE ENCOUNTER
Patient called needing another pa for zepbound due to her going back down to 2.5mg. patient stated that she is needing the PA ASAP because her next injection is tomorrow.

## 2024-03-12 NOTE — TELEPHONE ENCOUNTER
Patient called back she states her insurance company needs double prior authorization to confirm quantity. she gave me a fax number [phone] talk to prior authorization team and have started prior authorization. Could take 7-21 days to receive a response

## 2024-03-12 NOTE — TELEPHONE ENCOUNTER
Patient states that she needs this PA approved for the [facility] asap as she needs it for tomorrow. They need it for the quantity. Please advise

## 2024-03-12 NOTE — TELEPHONE ENCOUNTER
CHECKING TO SEE IF ZEPBOUND 2.5 MG WAS SENT TO PHARMACY NEEDS DOSE SOON. CONFIRMED SENT 03/11/24 638 PM

## 2024-03-12 NOTE — TELEPHONE ENCOUNTER
Patient was informed of the providers message. Pharmacy was called to cancel 7.5mg of zepbound per provider. Patient stated she needs another PA for zepbound 2.5mg. Information was routed to the PA pod.

## 2024-03-13 ENCOUNTER — TELEPHONE (OUTPATIENT)
Age: 54
End: 2024-03-13

## 2024-03-13 ENCOUNTER — TELEPHONE (OUTPATIENT)
Dept: BARIATRICS | Facility: CLINIC | Age: 54
End: 2024-03-13

## 2024-03-13 DIAGNOSIS — E66.9 OBESITY, CLASS I, BMI 30-34.9: Primary | ICD-10-CM

## 2024-03-13 RX ORDER — TIRZEPATIDE 2.5 MG/.5ML
2.5 INJECTION, SOLUTION SUBCUTANEOUS WEEKLY
Qty: 6 ML | Refills: 0 | Status: SHIPPED | OUTPATIENT
Start: 2024-03-13 | End: 2024-03-15 | Stop reason: DRUGHIGH

## 2024-03-13 NOTE — TELEPHONE ENCOUNTER
Patient really needs the provider to call the pharmacy to confirm that she can continue the 2.5 mg dose due to feeling nauseous on the next higher dose. She really needs this done today because she has to take the next dose today and [facility] closes at 7 pm.     Please reach out to the patient with any further questions and or the pharmacy as soon as possible.

## 2024-03-13 NOTE — TELEPHONE ENCOUNTER
Pt calling again - she is asking can someone call her insurance ASAP to just verify the Qty change auth asap she is due for a dose today.    PA phones number     And fax # [phone]  - please advise.

## 2024-03-13 NOTE — TELEPHONE ENCOUNTER
Spoke with [facility] in [place]. The script is there and ready for pickup, but the price has increased to $550. I then spoke with Cindy, and she reported she needs a 3 month supply. This can be [facility] and was sent. She had GI side effects from the 5 mg dose of Zepbound and wants to stay on the 2.5 mg dose.  If she had side effects on the 5 mg dose, I reviewed with her there is no need to take that. She can be off of Zepbound until it arrives and restart the the dose of 2.5 mg.

## 2024-03-13 NOTE — TELEPHONE ENCOUNTER
"Patient of Doris Melendez PA-C. Call received from [employer] Pre-Authorization department. Calling this a \"Claim rejected case\" Representative is stating that the patient's pharmacy is rejected the last prescription that was sent due to \"quantity of pens prescribed\". That are asking the Doctor to send new script to increase pen quantity.  "

## 2024-03-13 NOTE — TELEPHONE ENCOUNTER
Pt is now calling and giving contact info for the prior auth department at her insurance. She is asking can the office call ASAP or fax? She is due to take the 2.5mg dose today but since it is not covered wants to know should she suck it up and take the 5mg    PA department phone number is [phone]    Fax #[phone]

## 2024-03-14 NOTE — TELEPHONE ENCOUNTER
Spencer and Brenda from [facility] calling.  Brenda was able to get Zepbound to go through on [facility].  No further assistance needed at this time.    Case rec # [id number]

## 2024-03-15 ENCOUNTER — TELEPHONE (OUTPATIENT)
Dept: BARIATRICS | Facility: CLINIC | Age: 54
End: 2024-03-15

## 2024-03-15 ENCOUNTER — TELEPHONE (OUTPATIENT)
Age: 54
End: 2024-03-15

## 2024-03-15 DIAGNOSIS — E66.9 OBESITY, CLASS I, BMI 30-34.9: Primary | ICD-10-CM

## 2024-03-15 RX ORDER — TIRZEPATIDE 5 MG/.5ML
5 INJECTION, SOLUTION SUBCUTANEOUS WEEKLY
Qty: 6 ML | Refills: 0 | Status: SHIPPED | OUTPATIENT
Start: 2024-03-15 | End: 2024-06-01

## 2024-03-15 NOTE — TELEPHONE ENCOUNTER
Patient is requesting a call back regarding medication dosage due to pharmacy stating the multiple dosages they are receiving is a conflict. Pharmacist at [facility] would like a call as well from the provider to confirm which dosage they should administer for the patient so she is able to  the correct one. Patient states they sent a FAX into the office and is requesting the FAX to be filled out and sent back in as soon as possible.     Patient can receive a call back at [phone]    Pharmacist at [facility] can receive a call at [phone].    (3) no apparent problem

## 2024-03-15 NOTE — TELEPHONE ENCOUNTER
Patient called the office and reported to staff that she has increased Zepbound to 5 mg weekly.  She is requesting a 90-day supply of that dose to [facility].  Prescription sent.

## 2024-03-15 NOTE — TELEPHONE ENCOUNTER
PA for Zepbound 2.5mg auto injector Denied    Reason:(Screenshot if applicable)        Message sent to office clinical pool Yes    Denial letter scanned into Media Yes    Appeal started No waiting for providers response.

## 2024-03-18 ENCOUNTER — TELEPHONE (OUTPATIENT)
Age: 54
End: 2024-03-18

## 2024-03-18 NOTE — TELEPHONE ENCOUNTER
Telephone call from patient that she somehow got 2 boxes of the 2.5 mg Zepbound in the mail. But she is on 5 mg of Zepbound and thinks her body is finally adjusting to it and she isn't sure what to do with the 2.5 boxes she got. She wants to know if she should just go back to the 2.5 or in ject two 2.5 needles to make the 5.     She would prefer if Doris could give her a phone call back to guide her in what she should do or what the best idea is.

## 2024-03-18 NOTE — TELEPHONE ENCOUNTER
Pt called stating the pharmacy send the wrong dose, she been on .5mg for 2 weeks and wants to know if she should inject 2 to make up for 5mg ?

## 2024-03-18 NOTE — TELEPHONE ENCOUNTER
Please let her know that she should not inject 2 doses of the 2.5 mg, rather she should just hang on to the 2.5 mg dose and store it in the refrigerator. If she is doing well with the 5 mg dose, she should continue that.     Was it [facility] who sent the wrong script?

## 2024-03-18 NOTE — TELEPHONE ENCOUNTER
Pt is expecting a call back. She has questions about her Zepbound medication. Pt can be reached at [phone]

## 2024-03-19 NOTE — TELEPHONE ENCOUNTER
[facility] stated that [facility] had informed her that they will send the zepbound 5mg on the 20th of March.

## 2024-03-19 NOTE — TELEPHONE ENCOUNTER
Patient was informed of the providers message. Patient states that it was [facility] sending the zepbound 2.5mg instead of the 5mg.

## 2024-03-19 NOTE — TELEPHONE ENCOUNTER
Please reach out to [facility] to make sure they will be dispensing the Zepbound 5 mg dose to her. The 5 mg dose was sent to them on 3/15/2024.

## 2024-04-03 ENCOUNTER — TELEPHONE (OUTPATIENT)
Age: 54
End: 2024-04-03

## 2024-04-03 DIAGNOSIS — E66.9 OBESITY, CLASS I, BMI 30-34.9: ICD-10-CM

## 2024-04-03 RX ORDER — TIRZEPATIDE 5 MG/.5ML
5 INJECTION, SOLUTION SUBCUTANEOUS WEEKLY
Qty: 6 ML | Refills: 0 | Status: SHIPPED | OUTPATIENT
Start: 2024-04-03 | End: 2024-06-26

## 2024-04-03 RX ORDER — TIRZEPATIDE 7.5 MG/.5ML
7.5 INJECTION, SOLUTION SUBCUTANEOUS WEEKLY
Qty: 6 ML | Refills: 0 | Status: SHIPPED | OUTPATIENT
Start: 2024-04-03 | End: 2024-04-08 | Stop reason: SDUPTHER

## 2024-04-03 NOTE — TELEPHONE ENCOUNTER
Please let her know I can [facility] for both. Does she want 3 month supplies of each or one month supply?

## 2024-04-03 NOTE — TELEPHONE ENCOUNTER
Patient is requesting a 3 month supply for the 5mg and 7.5mg of Zepbound to be sent in [facility].

## 2024-04-03 NOTE — TELEPHONE ENCOUNTER
Patient called the RX Refill Line. Message is being forwarded to the office.     Patient is requesting multiple refills for her Zepbound.  She has been made aware there is or will be a shortage so she would like for the office to approve refills for the 5mg and for her next step up to the 7.5 [facility].    Please review and call the patient directly to discuss if this is possible    Please contact patient at  [phone]

## 2024-04-08 ENCOUNTER — OFFICE VISIT (OUTPATIENT)
Dept: GASTROENTEROLOGY | Facility: CLINIC | Age: 54
End: 2024-04-08
Payer: COMMERCIAL

## 2024-04-08 ENCOUNTER — TELEPHONE (OUTPATIENT)
Dept: FAMILY MEDICINE CLINIC | Facility: CLINIC | Age: 54
End: 2024-04-08

## 2024-04-08 VITALS
HEIGHT: 64 IN | DIASTOLIC BLOOD PRESSURE: 66 MMHG | WEIGHT: 173.8 LBS | BODY MASS INDEX: 29.67 KG/M2 | TEMPERATURE: 98.7 F | SYSTOLIC BLOOD PRESSURE: 98 MMHG

## 2024-04-08 DIAGNOSIS — K22.70 BARRETT'S ESOPHAGUS WITHOUT DYSPLASIA: ICD-10-CM

## 2024-04-08 DIAGNOSIS — K44.9 HIATAL HERNIA: ICD-10-CM

## 2024-04-08 DIAGNOSIS — E03.9 HYPOTHYROIDISM, UNSPECIFIED TYPE: Primary | ICD-10-CM

## 2024-04-08 DIAGNOSIS — K21.9 GASTROESOPHAGEAL REFLUX DISEASE, UNSPECIFIED WHETHER ESOPHAGITIS PRESENT: Primary | ICD-10-CM

## 2024-04-08 PROCEDURE — 99214 OFFICE O/P EST MOD 30 MIN: CPT | Performed by: INTERNAL MEDICINE

## 2024-04-08 RX ORDER — TIRZEPATIDE 7.5 MG/.5ML
7.5 INJECTION, SOLUTION SUBCUTANEOUS WEEKLY
Qty: 2 ML | Refills: 0 | Status: SHIPPED | OUTPATIENT
Start: 2024-04-08 | End: 2024-05-06

## 2024-04-08 RX ORDER — TIRZEPATIDE 7.5 MG/.5ML
7.5 INJECTION, SOLUTION SUBCUTANEOUS WEEKLY
Qty: 6 ML | Refills: 0 | Status: SHIPPED | OUTPATIENT
Start: 2024-04-08 | End: 2024-04-09 | Stop reason: SDUPTHER

## 2024-04-08 NOTE — TELEPHONE ENCOUNTER
Pt call in office states she lost 29 pound and is having a lot pressure back of the eyes pt also want to re-check blood work to check the thyroid level since she is in a levothyroxine  75 mg,      GI recommend to call he primary dr to advise.     Pt aware insurance would not cover for blood word since her last  was 03/11/24   59 yo F, from home, ambulated with cane , PMHx of obesity, asthma, chronic bronchitis, NAKUL non compliant with CPAP over night, HTN, DM, HLD, fibromyalgia, anxiety, trigeminal neuralgia, degenerative joint disease, ?IgA vasculitis, psoriasis, OA came with chief complain of chest pain and dysuria. patient admitted to tele, Troponin x 2 negative,  CTAP- 2/4- showed Skin thickening with subcutaneous stranding in the right lower quadrant anterior abdominal wall c/w cellulitis. pt received Zithromax and Rocephin in ED x1 , Abx dc,d due to negative UA, pt started on keflex 500mg qid for cellulitis , hospital course complicated with encephalopathy and ATN   on 2/5, Cr increased from 0.83 to 3.1,  IV cefazolin started in setting of possible sepsis 2/2 cellulitis. pt noted to progressively getting altered over 2/6, VBG remarkable for PH :7.14, pco2 82, pt was placed on Bi-pap reportedly over night.  Patient found to be more lethargic in the morning.  Unable to perform ROS. ABG showed acute on chronic respiratory acidosis , Hypercapnia 91, respiratory placed pt on AVAPS . ICU consulted for further evaluation. Patient will require  intubation , will transfer to ICU for further work-up.     Assessment:    Acute hypercapnic hypoxic respiratory failure   NAKUL requiring CPAP at night (non -compliant)  Cellulitis  Chronic bronchitis   Asthma   sepsis  Psoriasis   Fibromyalgia   DM  Hypothyroidism       Plan:    =====================[CNS ]==============================  # encephalopathy  - resolved  - Likely due to hypercapnia , PCO2:91  - Intubated  - propofol, precedex  - now awake and following commands  - SBT    # History of Fibromyalgia on  gabapentin, flexeril, Tylenol, aleve, oxcarbazepine for pain at home   - Lidoderm 4% patch 2 patches daily  - continue to hold all home meds  - fentanyl  =====================[CVS ]==========================  # HTN :    - BP currently acceptable off medication   - monitor BP in setting of ATN / sepsis   - keep MAP >65    =====================[RESP ]==============================  # Acute hypoxic hypercapnic respiratory failure - resolved  - likely in setting of NAKUL / cheonic bronchitis / asthma   - mechanical ventilation   - d-dimmer wnl  - Monitor ABG  - Hypercapnia resolved  - Following simple commands, SBT today    #? tracheal/laryngeal edema vs body habitus obstruction  - failed leak test prior to extubation  - started on decadron 4mg q 6  - re attempt at extubation on friday.     =====================[ GI ]============================  # No acute issues  - tube feeds  - c/w PPi for GI prophylaxis     ====================[ RENAL ]=============================   # Ernestina:   - improving  - pt with good urine output, net neg in 24 hrs  -Strict I/o, Avoid Nephrotoxic agents  -Maintain MAP>65-70 mm Hg  - Monitor BMP and electrolytes    # abnormal electrolytes:   - Hyperkalemia resolved   - Hyperphosphatemia     # Left renal lesion chronic  - outpatient f/u with Urology     =====================[ ID ]============================  # Cellulitis   -  was on Cefazolin  - spiked fevers, escalated to zosyn  - normal wbc  - UA negative for WBC , f/u UC   - ID Dr Jacobsen following       ===================[ HEME/ONC ]===========================  # anemia of chronic illness  -monitor cbc      =====================[ ENDO ]======================  # DM :   - target CBG < 180  - currently on  Lantus and HSS , adjust as needed    # Hypothyroidism   - f/u TSH in setting of encephalopathy   - c/w synthroid      ================= Skin/Catheters============================  # cellulitis likley in setting of Psoriasis  - c/w IV ABx   - pt has  Bustamante    ==================[ PROPHYLAXIS ]==========================   # Dvt : c/u SQH , f/u D dimer   # Gi : Protonix     ====================[ DISPO ]======================   - Monitor in ICU     ===================[ PROGNOSIS ]====================  - Guarded 59 yo F, from home, ambulated with cane , PMHx of obesity, asthma, chronic bronchitis, NAKUL non compliant with CPAP over night, HTN, DM, HLD, fibromyalgia, anxiety, trigeminal neuralgia, degenerative joint disease, ?IgA vasculitis, psoriasis, OA came with chief complain of chest pain and dysuria. patient admitted to tele, Troponin x 2 negative,  CTAP- 2/4- showed Skin thickening with subcutaneous stranding in the right lower quadrant anterior abdominal wall c/w cellulitis. pt received Zithromax and Rocephin in ED x1 , Abx dc,d due to negative UA, pt started on keflex 500mg qid for cellulitis , hospital course complicated with encephalopathy and ATN   on 2/5, Cr increased from 0.83 to 3.1,  IV cefazolin started in setting of possible sepsis 2/2 cellulitis. pt noted to progressively getting altered over 2/6, VBG remarkable for PH :7.14, pco2 82, pt was placed on Bi-pap reportedly over night.  Patient found to be more lethargic in the morning.  Unable to perform ROS. ABG showed acute on chronic respiratory acidosis , Hypercapnia 91, respiratory placed pt on AVAPS . ICU consulted for further evaluation. Patient will require  intubation , will transfer to ICU for further work-up.     Assessment:    Acute hypercapnic hypoxic respiratory failure   NAKUL requiring CPAP at night (non -compliant)  Cellulitis  Chronic bronchitis   Asthma   sepsis  Psoriasis   Fibromyalgia   DM  Hypothyroidism       Plan:    =====================[CNS ]==============================  # encephalopathy  - resolved  - Likely due to hypercapnia , PCO2:91  - Intubated  - propofol, precedex  - now awake and following commands  - SBT    # History of Fibromyalgia on  gabapentin, flexeril, Tylenol, aleve, oxcarbazepine for pain at home   - Lidoderm 4% patch 2 patches daily  - continue to hold all home meds  - fentanyl  =====================[CVS ]==========================  # HTN :    - BP currently acceptable off medication   - monitor BP in setting of ATN / sepsis   - keep MAP >65    =====================[RESP ]==============================  # Acute hypoxic hypercapnic respiratory failure - resolved  - likely in setting of NAKUL / cheonic bronchitis / asthma   - mechanical ventilation   - d-dimmer wnl  - Monitor ABG  - Hypercapnia resolved  - Following simple commands, SBT today    #? tracheal/laryngeal edema vs body habitus obstruction  - failed leak test prior to extubation  - continue on decadron 4mg q 6  - pt with still - leak test, plan for extubation tomorrow with anesthesia    =====================[ GI ]============================  # No acute issues  - tube feeds  - c/w PPi for GI prophylaxis     ====================[ RENAL ]=============================   # Ernestina:   - improving  - pt with good urine output, net neg in 24 hrs  -Strict I/o, Avoid Nephrotoxic agents  -Maintain MAP>65-70 mm Hg  - Monitor BMP and electrolytes    # abnormal electrolytes:   - Hyperkalemia resolved   - Hyperphosphatemia     # Left renal lesion chronic  - outpatient f/u with Urology     =====================[ ID ]============================  # Cellulitis   -  was on Cefazolin  - spiked fevers, escalated to zosyn  - normal wbc  - UA negative for WBC , f/u UC   - ID Dr Jacobsen following       ===================[ HEME/ONC ]===========================  # anemia of chronic illness  -monitor cbc      =====================[ ENDO ]======================  # DM :   - target CBG < 180  - currently on  Lantus and HSS , adjust as needed    # Hypothyroidism   - f/u TSH in setting of encephalopathy   - c/w synthroid      ================= Skin/Catheters============================  # cellulitis likley in setting of Psoriasis  - c/w IV ABx   - pt has  Bustamante    ==================[ PROPHYLAXIS ]==========================   # Dvt : c/u SQH , f/u D dimer   # Gi : Protonix     ====================[ DISPO ]======================   - Monitor in ICU     ===================[ PROGNOSIS ]====================  - Guarded

## 2024-04-08 NOTE — TELEPHONE ENCOUNTER
Her TSH was in normal range on the current dose of Levothyroxine.She can recheck but may not be covered since it was just done and normal. Order was placed for repeat TSH.

## 2024-04-08 NOTE — TELEPHONE ENCOUNTER
NOT A DUPLICATE     Patient called asking for Zepbound 7.5mg a 30 day supply sent to [facility] in [place], and [facility] the [facility] script [facility].

## 2024-04-08 NOTE — PROGRESS NOTES
[facility] - Outpatient Consultation  Cindy Gilbert 53 y.o. female MRN: [id number]  Encounter: [id number]          ASSESSMENT AND PLAN:      1. Gastroesophageal reflux disease, unspecified whether esophagitis present  2. Saavedra's esophagus without dysplasia  3. Hiatal hernia    53-year-old female presenting for follow-up regarding GERD and Saavedra's after starting Zepbound.     Discussed that she should remain on at least once daily PPI if she is able to tolerate this both for management of significant GERD as well as prevention of esophagitis, and protective for history of Saavedra's esophagus.  She will continue omeprazole daily today.  Counseled on appropriate use 30 to 60 minutes before a meal.  If needed, this can be bumped to twice daily if she has incomplete symptom control.  Repeat EGD in 5 years for Saavedra's history     Mild constipation after starting GLP-1 agonist- continue Miralax as needed.        Follow up routinely in 6 months  ______________________________________________________________________    HPI:  Cindy Gilbert is a pleasant 54 y/o female presenting for follow-up regarding history of erosive esophagitis, Saavedra's, GERD, recent starting GLP-1 agonist.      She has tolerated Zepbound very well and actually thinks it has helped her GI symptoms due to smaller meals. Her GERD is very controlled with daily PPI. She has lost 20lbs since last visit.    Summary of HPI from 9/27/2023 :  GI symptoms ongoing for prolonged period of time, recently worsened. She had a DVT leading to pulmonary embolism diagnosed in September 2022 thought to be related to prolonged travel.  She was on anticoagulation but this has now been discontinued.  During her CT PE studies there was noted colonic diverticulosis as well as small hiatal hernia.   She does not move her bowels regularly, although she does have some stool output roughly 5 days/week, she never feels like she is completely  evacuating.  She denies blood in the stool.  No family history of colorectal cancer or other abnormalities in the colon.    She had negative Cologuard in .  For follow-up EGD 2023 for assessment of healing of esophagitis.  Esophagitis had healed but she did have C3M3 segment of Saavedra's esophagus.  3 cm hiatal hernia noted.  Biopsies confirmed Saavedra's esophagus without dysplasia.  Initial EGD demonstrated grade C esophagitis, 4 cm hiatal hernia.  Gastric biopsies demonstrated focal intestinal metaplasia without dysplasia.  Colonoscopy revealed 1 subcentimeter polyp and left-sided diverticular disease.     REVIEW OF SYSTEMS:    CONSTITUTIONAL: Denies any fever, chills, rigors, and weight loss.  HEENT: Denies odynophagia, tinnitus  CARDIOVASCULAR: No chest pain or palpitations.   RESPIRATORY: Denies any cough, hemoptysis, shortness of breath or dyspnea on exertion.  GASTROINTESTINAL: As noted in the History of Present Illness.   GENITOURINARY: No problems with urination. Denies any hematuria or dysuria.  NEUROLOGIC: No dizziness or vertigo, denies headaches.   MUSCULOSKELETAL: Denies any muscle or joint pain.   SKIN: Denies skin rashes or itching.   ENDOCRINE:  Denies intolerance to heat or cold.  PSYCHOSOCIAL: Denies depression or anxiety. Denies any recent memory loss.       Historical Information   Past Medical History:   Diagnosis Date   • Allergic     as long as i can remember dust pollen etc   • Anemia     i can tend to be anemic i control through diet & supplementn   • Anxiety     as long as i can remember   • DVT, lower extremity, distal, acute, right (HCC) 2022   • GERD (gastroesophageal reflux disease)     i have it on occasion   • GERD (gastroesophageal reflux disease)     i have it on occasion   • Headache(784.0)     i get tension headaches alot   • Pulmonary embolism (HCC)    • Thrombosis     shallow vein R leg     Past Surgical History:   Procedure Laterality Date   •  SECTION   "2005    2nd son's birth   • COLONOSCOPY     • TUBAL LIGATION      after second son was born in    • UPPER GASTROINTESTINAL ENDOSCOPY       Social History   Social History     Substance and Sexual Activity   Alcohol Use Not Currently     Social History     Substance and Sexual Activity   Drug Use Never     Social History     Tobacco Use   Smoking Status Former   • Current packs/day: 0.00   • Average packs/day: 0.3 packs/day for 30.0 years (7.5 ttl pk-yrs)   • Types: Cigarettes, E-Cigarettes   • Start date:    • Quit date: 2022   • Years since quittin.6   Smokeless Tobacco Never   Tobacco Comments    havent smoked in a long time     Family History   Problem Relation Age of Onset   • Other Mother         brain tumor   • Other Father         lung mass   • Hypertension Father    • Breast cancer Paternal Grandmother    • Diabetes Paternal Grandmother    • Substance Abuse Neg Hx    • Mental illness Neg Hx        Meds/Allergies       Current Outpatient Medications:   •  ALPRAZolam (XANAX) 0.5 mg tablet  •  aspirin (ECOTRIN LOW STRENGTH) 81 mg EC tablet  •  clobetasol (TEMOVATE) 0.05 % ointment  •  dicyclomine (BENTYL) 20 mg tablet  •  levothyroxine (Euthyrox) 75 mcg tablet  •  omeprazole (PriLOSEC) 20 mg delayed release capsule  •  tirzepatide (Zepbound) 5 mg/0.5 mL auto-injector  •  tirzepatide (Zepbound) 7.5 mg/0.5 mL auto-injector  •  Acetaminophen 500 MG  •  albuterol (Proventil HFA) 90 mcg/act inhaler  •  benzonatate (TESSALON) 200 MG capsule  •  fluticasone (FLONASE) 50 mcg/act nasal spray  •  RA Acetaminophen Ex St 500 MG tablet    Allergies   Allergen Reactions   • Iodine I 131 Tositumomab Rash     OK for CT per Dr Alcantar   • Doxycycline GI Intolerance and Vomiting   • Latex    • Other Hives           Objective     Blood pressure 98/66, temperature 98.7 °F (37.1 °C), temperature source Tympanic, height 5' 4\" (1.626 m), weight 78.8 kg (173 lb 12.8 oz). Body mass index is 29.83 " kg/m².        PHYSICAL EXAM:      General Appearance:   Alert, cooperative, no distress   HEENT:   Normocephalic, atraumatic, anicteric.     Neck:  Supple, symmetrical, trachea midline   Lungs:   Clear to auscultation bilaterally; no rales, rhonchi or wheezing; respirations unlabored    Heart::   Regular rate and rhythm; no murmur, rub, or gallop.   Abdomen:   Soft, non-tender, non-distended; normal bowel sounds; no masses, no organomegaly    Genitalia:   Deferred    Rectal:   Deferred    Extremities:  No cyanosis, clubbing or edema    Pulses:  2+ and symmetric    Skin:  No jaundice, rashes, or lesions          Lab Results:   No visits with results within 1 Day(s) from this visit.   Latest known visit with results is:   Orders Only on 01/10/2024   Component Date Value   • Insulin 01/10/2024 7.3    • Hemoglobin A1C 01/10/2024 5.5    • Estimated Average Glucose 01/10/2024 111    • Estimated Average Glucos* 01/10/2024 6.2    • COMMENT 01/10/2024 HbA1c performed on Abbott platform.          Radiology Results:   No results found.  Answers submitted by the patient for this visit:  Abdominal Pain Questionnaire (Submitted on 4/1/2024)  Chief Complaint: Abdominal pain  Chronicity: recurrent  Onset: 1 to 4 weeks ago  Onset quality: sudden  Frequency: intermittently  Progression since onset: gradually improving  Pain location: LLQ, periumbilical region  Pain - numeric: 4/10  Pain quality: cramping  Radiates to: epigastric region  anorexia: No  arthralgias: No  belching: No  constipation: No  diarrhea: No  dysuria: No  fever: No  flatus: No  frequency: No  headaches: No  hematochezia: No  hematuria: No  melena: No  myalgias: No  nausea: No  weight loss: No  vomiting: No  Aggravated by: nothing, eating  Relieved by: nothing, being still, bowel movements, passing flatus, recumbency  Diagnostic workup: upper endoscopy

## 2024-04-09 ENCOUNTER — TELEPHONE (OUTPATIENT)
Dept: BARIATRICS | Facility: CLINIC | Age: 54
End: 2024-04-09

## 2024-04-09 DIAGNOSIS — E66.9 OBESITY, CLASS I, BMI 30-34.9: ICD-10-CM

## 2024-04-09 NOTE — TELEPHONE ENCOUNTER
Reason for call: Patient wanted the medication resent because she said it was on hold somewhere and she is not going to wait and see if it goes through.  [x] Refill   [] Prior Auth  [] Other:     Office:   [] PCP/Provider -   [x] Specialty/Provider -     Medication:     tirzepatide (Zepbound) 7.5 mg/0.5 mL auto-injector       Dose/Frequency: Inject 0.5 mL (7.5 mg total) under the skin once a week,     Quantity: 6 mL     Pharmacy: [facility] - [address] [phone]     Does the patient have enough for 3 days?   [x] Yes   [] No - Send as HP to POD

## 2024-04-10 RX ORDER — TIRZEPATIDE 7.5 MG/.5ML
7.5 INJECTION, SOLUTION SUBCUTANEOUS WEEKLY
Qty: 6 ML | Refills: 0 | Status: SHIPPED | OUTPATIENT
Start: 2024-04-10 | End: 2024-07-03

## 2024-04-16 DIAGNOSIS — E66.9 OBESITY, CLASS I, BMI 30-34.9: ICD-10-CM

## 2024-04-16 RX ORDER — TIRZEPATIDE 7.5 MG/.5ML
7.5 INJECTION, SOLUTION SUBCUTANEOUS WEEKLY
Qty: 2 ML | Refills: 0 | Status: SHIPPED | OUTPATIENT
Start: 2024-04-16 | End: 2024-05-14

## 2024-04-16 NOTE — TELEPHONE ENCOUNTER
- This is NOT a duplicate! Patient was told to have it sent early so she can be on the waitlist at her local pharmacy  - Patients mail order is on back order and not sure when they will get it    Reason for call:   [x] Refill   [] Prior Auth  [] Other:     Office:   [] PCP/Provider -   [x] Specialty/Provider - Weight Management    Medication:   Tirzepatide (zepound) 7.5 mg/0.5ml- inject 0.5 ml under the skin once a week for 28 days      Pharmacy: [facility]    Does the patient have enough for 3 days?   [x] Yes   [] No - Send as HP to POD

## 2024-04-30 ENCOUNTER — TELEPHONE (OUTPATIENT)
Dept: BARIATRICS | Facility: CLINIC | Age: 54
End: 2024-04-30

## 2024-04-30 ENCOUNTER — TELEPHONE (OUTPATIENT)
Age: 54
End: 2024-04-30

## 2024-04-30 DIAGNOSIS — E66.09 CLASS 1 OBESITY DUE TO EXCESS CALORIES WITHOUT SERIOUS COMORBIDITY WITH BODY MASS INDEX (BMI) OF 33.0 TO 33.9 IN ADULT: Primary | ICD-10-CM

## 2024-04-30 DIAGNOSIS — E66.09 CLASS 1 OBESITY DUE TO EXCESS CALORIES WITHOUT SERIOUS COMORBIDITY WITH BODY MASS INDEX (BMI) OF 33.0 TO 33.9 IN ADULT: ICD-10-CM

## 2024-04-30 RX ORDER — TIRZEPATIDE 10 MG/.5ML
10 INJECTION, SOLUTION SUBCUTANEOUS WEEKLY
Qty: 2 ML | Refills: 0 | Status: SHIPPED | OUTPATIENT
Start: 2024-04-30 | End: 2024-05-01 | Stop reason: SDUPTHER

## 2024-04-30 NOTE — TELEPHONE ENCOUNTER
Patient called refill line stating that no pharmacy has the zepbound 7.5mg in stock right now and states that [facility] does have the Zepbound 10mg in stock. Patient is asking that the Zepbound 10mg be sent over right away for a 90 day supply, as it is first come first serve, while she waits for the 7.5mg to become available. Please review and call patient to let her know if this can be done

## 2024-05-01 ENCOUNTER — PATIENT MESSAGE (OUTPATIENT)
Dept: BARIATRICS | Facility: CLINIC | Age: 54
End: 2024-05-01

## 2024-05-01 RX ORDER — TIRZEPATIDE 10 MG/.5ML
10 INJECTION, SOLUTION SUBCUTANEOUS WEEKLY
Qty: 6 ML | Refills: 0 | Status: SHIPPED | OUTPATIENT
Start: 2024-05-01 | End: 2024-07-24

## 2024-05-01 NOTE — TELEPHONE ENCOUNTER
Patient called in to inform office that the prescription was sent to the wrong pharmacy. She is requesting that the Zepbound 10 MG- 90 Day Supply [facility] Not [facility]. Please call the patient back as she states she is confused and does not know why the medication went to [facility] instead of [facility]. Please call the patient at [phone] once prescription is sent into [facility]. Patient states [facility] needs a double TINA faxed over due to the increase in dosage to reiterate to [facility] that the additional quantities are approved. Patient is requesting this to be done as soon as possible as she has been going back and forth.

## 2024-05-02 ENCOUNTER — TELEPHONE (OUTPATIENT)
Dept: BARIATRICS | Facility: CLINIC | Age: 54
End: 2024-05-02

## 2024-05-02 ENCOUNTER — PATIENT MESSAGE (OUTPATIENT)
Dept: BARIATRICS | Facility: CLINIC | Age: 54
End: 2024-05-02

## 2024-05-02 NOTE — PATIENT COMMUNICATION
Patient called in for an update on this. Please call the patient back at [phone] to advise what she should do moving forward. Patient is also requesting if multiple prescriptions can be sent to multiple pharmacies due to pharmacies having less quantity in stock.

## 2024-05-02 NOTE — PATIENT COMMUNICATION
My office will reach out to [facility] to try to find out if prior auth is needed, or if she is having difficulty obtaining the medication due to supply chain shortage. If she is not able to get Zepbound, trying to change to Wegovy is an option.

## 2024-05-03 ENCOUNTER — TELEPHONE (OUTPATIENT)
Dept: BARIATRICS | Facility: CLINIC | Age: 54
End: 2024-05-03

## 2024-05-03 DIAGNOSIS — E66.9 OBESITY, CLASS I, BMI 30-34.9: Primary | ICD-10-CM

## 2024-05-03 NOTE — TELEPHONE ENCOUNTER
----- Message from Cindy Gilbert sent at 5/3/2024  2:06 PM EDT -----  Regarding: Zepbound  Contact: [phone]  Cristian George!  Thanks so much for getting back to me. I just got off the phone with [facility] to see what the situation was as far as coverage and PAs, etc. They informed me that it is OK to have both the Zepbound and Wegovy on file at the same time. This way I have the option to keep both on file for shortage issues etc.     I spoke to [facility] in [place] and they do have the starter dose of Wegovy available and in stock. Can you please call in a PA to [facility] for the Wegovy and please send in the starter dose electronically to [facility] in [place].     Ideally, I would love to stay on the zepbound but after confirming with [facility] that I CAN  have both Zepbound and Wegovy on file (with proper PA’s ) at the same time,  at least I will have something in my back pocket if the shortage keeps continuing past May 15th or the 31st since they’re not sure.  I went back to the 5mg since I had it as a back up in my fridge.     Thanks you to and all the staff for your help!

## 2024-05-03 NOTE — TELEPHONE ENCOUNTER
I reached out to [facility] yesterday at 8:30 am and I informed patient as well. She dose not need a PA on the medications.

## 2024-05-03 NOTE — TELEPHONE ENCOUNTER
Regarding: Zepbound  Contact: [phone]  ----- Message from EMA Foster sent at 5/2/2024 11:54 AM EDT -----  Thang Bunch, this is the patient you already called [facility] about. Maggie, Doris     ----- Message from Cindy Gilbert to Esther Redman sent at 5/1/2024  6:19 PM -----   Hi there!   So……still having trouble with getting the scripts filled. Looks like I will need PA’s sent [facility] for ALL DOSES 5, 7.5 and 10 along with an additional PAs for overages on quantity amounts again on all three doses Please!!!! Sorry …. hopefully once those two things are sent with all doses I should be okay. Thanks for all your help with all of this !!       ----- Message -----       From:Esther PERRY       Sent:5/1/2024  2:19 PM EDT         To:Cindy Gilbert    Subject:Zepbound    Hi Doris White sent  Zepbound 10 mg three month supply was sent to [facility].      Please reach out anytime as well.    Thank You   Esther SUÁREZ

## 2024-05-06 ENCOUNTER — TELEPHONE (OUTPATIENT)
Dept: BARIATRICS | Facility: CLINIC | Age: 54
End: 2024-05-06

## 2024-05-06 DIAGNOSIS — E66.9 OBESITY, CLASS I, BMI 30-34.9: Primary | ICD-10-CM

## 2024-05-06 RX ORDER — TIRZEPATIDE 10 MG/.5ML
10 INJECTION, SOLUTION SUBCUTANEOUS WEEKLY
Qty: 2 ML | Refills: 0 | Status: SHIPPED | OUTPATIENT
Start: 2024-05-06 | End: 2024-06-03

## 2024-05-07 ENCOUNTER — TELEPHONE (OUTPATIENT)
Dept: BARIATRICS | Facility: CLINIC | Age: 54
End: 2024-05-07

## 2024-05-07 RX ORDER — TIRZEPATIDE 5 MG/.5ML
5 INJECTION, SOLUTION SUBCUTANEOUS WEEKLY
Qty: 2 ML | Refills: 0 | Status: SHIPPED | OUTPATIENT
Start: 2024-05-07 | End: 2024-06-04

## 2024-05-07 NOTE — TELEPHONE ENCOUNTER
----- Message from Cindy Gilbert sent at 5/6/2024 10:32 PM EDT -----  Regarding: Wegovy and Zepbound  Contact: [phone]  So it looks like the 90 day scripts are still in the system as unavailable so that’s good. Now it’s just a matter of waiting for something to be available.    EEG preliminary read (not final) on the initial recording hour(s) = x 1.5    No seizures recorded.    Diffuse slowing noted, nonspecific.  Final report to follow tomorrow morning after completion of study.    [facility] EEG Reading Room Ph#: [phone]  Epilepsy Answering Service after 5PM and before 8:30AM: Ph#: [phone]

## 2024-05-07 NOTE — TELEPHONE ENCOUNTER
Patient is currently taking Zepbound, but wants Wegovy prior auth to start taking Wegovy as a backup only.

## 2024-05-07 NOTE — TELEPHONE ENCOUNTER
Needs Prior authorization and TINA's for Zepbound 5 mg, 7.5 mg and 10 mg. Do not obtain prior auth for Wegovy.

## 2024-05-09 ENCOUNTER — TELEPHONE (OUTPATIENT)
Dept: BARIATRICS | Facility: CLINIC | Age: 54
End: 2024-05-09

## 2024-05-09 NOTE — TELEPHONE ENCOUNTER
----- Message from Cindy Gilbert sent at 5/9/2024  9:12 AM EDT -----  Regarding: Wegovy and Zepbound  Contact: [phone]  Hello again!!  I just spoke to [facility] pharmacy and they are telling me that the TINA (for approval of overage quantity) for the Zepbound 7.5 has not yet been submitted and that is the one that I really need done since I cannot fill that rx for the additional box without it.     Can someone please follow up on this TINA for that dosage specifically And let me know when it is completed so that I can alert the pharmacy. That Dose is in high demand and without the TINA they can’t even put me in the queue to wait for the script to be filled at this point until that part is completed.     Thanks again for your help!!

## 2024-05-13 NOTE — TELEPHONE ENCOUNTER
I can not do a TINA on all doses because the insurance will deny it and kick it back. I can do a TINA for the 5mg if needed.

## 2024-05-15 ENCOUNTER — OFFICE VISIT (OUTPATIENT)
Dept: BARIATRICS | Facility: CLINIC | Age: 54
End: 2024-05-15
Payer: COMMERCIAL

## 2024-05-15 VITALS
HEIGHT: 65 IN | WEIGHT: 164.6 LBS | BODY MASS INDEX: 27.42 KG/M2 | SYSTOLIC BLOOD PRESSURE: 106 MMHG | DIASTOLIC BLOOD PRESSURE: 68 MMHG | HEART RATE: 75 BPM | TEMPERATURE: 97 F

## 2024-05-15 DIAGNOSIS — E03.9 HYPOTHYROIDISM, UNSPECIFIED TYPE: ICD-10-CM

## 2024-05-15 DIAGNOSIS — E66.3 OVERWEIGHT: Primary | ICD-10-CM

## 2024-05-15 PROCEDURE — 99214 OFFICE O/P EST MOD 30 MIN: CPT | Performed by: NURSE PRACTITIONER

## 2024-05-15 NOTE — PROGRESS NOTES
Assessment/Plan:     Overweight  - Patient is pursuing Conservative Program  - Initial weight loss goal of 5-10% weight loss for improved health-met  - S/P tubal ligation.   - Not interested in phentermine due to concern for side effects and will avoid this due to taking PRN xanax and could worsen anxiety.   - Patient denies personal history of pancreatitis. Patient also denies personal and family history of thyroid cancer and multiple endocrine neoplasia type 2 (MEN 2 tumor).   - Zepbound started Jan 2024 at weight of 203.1 lbs. Some tiredness, but denies bothersome side effects, helping with appetite. Currently taking 7.5 mg weekly and would like to continue this as she has an upcoming trip and is concerned about potential side effects on 10 mg dose while away on trip. Prior auth team is currently working on TINA request for 7.5 mg dose. Advised not to combine 2.5 mg and 5 mg doses together to equal 7.5 mg dose, as safety would not be known.    - Labs reviewed: TSH and free T4 3/11/2024. A1C and fasting insulin 1/10/2024. Blood work was within acceptable range.       Initial: 203.1 lbs BMI 34.34  Current: 164.6 lbs BMI 27.82  Change: -38 lbs  Goal: 145 lbs       Goals:  Do not skip meals.  Keep track of calories.   1100 calories per day.   Keep up the great work with water intake, at least 64 oz daily.   Keep up the great work with walking.   Continue Zepbound.    Hypothyroidism  - Taking levothyroxine. Continue management with prescribing provider.          Cindy was seen today for follow-up.    Diagnoses and all orders for this visit:    Overweight    Hypothyroidism, unspecified type            Follow up in approximately  2 month nurse visit and 5 months  with Non-Surgical Physician/Advanced Practitioner.    Subjective:   Chief Complaint   Patient presents with    Follow-up     MWM-4M F/u       Patient ID: Cindy Gilbert  is a 53 y.o. female with excess weight/obesity here to pursue weight management.  Patient  is pursuing Conservative Program.   Most recent notes and records were reviewed.    HPI    Wt Readings from Last 10 Encounters:   05/15/24 74.7 kg (164 lb 9.6 oz)   04/08/24 78.8 kg (173 lb 12.8 oz)   03/11/24 82.1 kg (181 lb)   01/09/24 92.2 kg (203 lb 3.2 oz)   01/03/24 92.5 kg (204 lb)   12/26/23 90.7 kg (200 lb)   11/29/23 92.1 kg (203 lb)   10/24/23 89.8 kg (198 lb)   10/23/23 92.1 kg (203 lb)   09/27/23 92.1 kg (203 lb)     Taking Zepbound 7.5 mg weekly, feeling tired, but no other negative effects, helping with appetite.     Not tracking calories, but believes she is getting 0756-6704 calories per day. Getting  grams protein. Not skipping meals.      Hydration:  oz water, 1 cup coffee milk and splenda, occ ginger ale   Alcohol: none  Smoking: denies  Exercise: walks dogs 5 days per week   Occupation: works in doctor's office   Sleep: more fragmented - child will be graduating       Colonoscopy: UTD, due 2030  Mammogram: scheduled with [facility] June 4, 2024           The following portions of the patient's history were reviewed and updated as appropriate: allergies, current medications, past family history, past medical history, past social history, past surgical history, and problem list.    Family History   Problem Relation Age of Onset    Other Mother         brain tumor    Other Father         lung mass    Hypertension Father     Breast cancer Paternal Grandmother     Diabetes Paternal Grandmother     Substance Abuse Neg Hx     Mental illness Neg Hx         Review of Systems   HENT:  Negative for sore throat.    Respiratory:  Negative for cough and shortness of breath.    Cardiovascular:  Negative for chest pain and palpitations.   Gastrointestinal:  Positive for abdominal pain (seeing GI - related to jesus's) and constipation (slight). Negative for diarrhea, nausea and vomiting.        + GERD controlled with medication   Musculoskeletal:  Positive for arthralgias (intermittent) and back  "pain.   Skin:  Negative for rash.   Psychiatric/Behavioral:  Negative for suicidal ideas (or HI).         + depression and anxiety situational       Objective:  /68   Pulse 75   Temp (!) 97 °F (36.1 °C) (Tympanic)   Ht 5' 4.5\" (1.638 m)   Wt 74.7 kg (164 lb 9.6 oz)   BMI 27.82 kg/m²     Physical Exam  Vitals and nursing note reviewed.        Constitutional   General appearance: Abnormal.  well developed and overweight.   Eyes No conjunctival injection.   Ears, Nose, Mouth, and Throat Oral mucosa moist.   Pulmonary   Respiratory effort: No increased work of breathing or signs of respiratory distress.    Cardiovascular   Examination of extremities for edema and/or varicosities: Normal.  no edema.   Abdomen   Abdomen: Abnormal overweight.     Musculoskeletal   Normal range of motion  Neurological   Gait and station: Normal.   Psychiatric   Orientation to person, place and time: Normal.    Affect: appropriate     "

## 2024-05-16 ENCOUNTER — TELEPHONE (OUTPATIENT)
Dept: BARIATRICS | Facility: CLINIC | Age: 54
End: 2024-05-16

## 2024-05-16 DIAGNOSIS — Z79.899 MEDICATION MANAGEMENT: ICD-10-CM

## 2024-05-16 DIAGNOSIS — E66.3 OVERWEIGHT: Primary | ICD-10-CM

## 2024-05-16 NOTE — ASSESSMENT & PLAN NOTE
- Patient is pursuing Conservative Program  - Initial weight loss goal of 5-10% weight loss for improved health-met  - S/P tubal ligation.   - Not interested in phentermine due to concern for side effects and will avoid this due to taking PRN xanax and could worsen anxiety.   - Patient denies personal history of pancreatitis. Patient also denies personal and family history of thyroid cancer and multiple endocrine neoplasia type 2 (MEN 2 tumor).   - Zepbound started Jan 2024 at weight of 203.1 lbs. Some tiredness, but denies bothersome side effects, helping with appetite. Currently taking 7.5 mg weekly and would like to continue this as she has an upcoming trip and is concerned about potential side effects on 10 mg dose while away on trip. Prior auth team is currently working on TINA request for 7.5 mg dose. Advised not to combine 2.5 mg and 5 mg doses together to equal 7.5 mg dose, as safety would not be known.    - Labs reviewed: TSH and free T4 3/11/2024. A1C and fasting insulin 1/10/2024. Blood work was within acceptable range.       Initial: 203.1 lbs BMI 34.34  Current: 164.6 lbs BMI 27.82  Change: -38 lbs  Goal: 145 lbs       Goals:  Do not skip meals.  Keep track of calories.   1100 calories per day.   Keep up the great work with water intake, at least 64 oz daily.   Keep up the great work with walking.   Continue Zepbound.

## 2024-05-20 ENCOUNTER — OFFICE VISIT (OUTPATIENT)
Dept: VASCULAR SURGERY | Facility: CLINIC | Age: 54
End: 2024-05-20
Payer: COMMERCIAL

## 2024-05-20 ENCOUNTER — PATIENT MESSAGE (OUTPATIENT)
Dept: BARIATRICS | Facility: CLINIC | Age: 54
End: 2024-05-20

## 2024-05-20 VITALS
DIASTOLIC BLOOD PRESSURE: 72 MMHG | WEIGHT: 162 LBS | BODY MASS INDEX: 26.99 KG/M2 | SYSTOLIC BLOOD PRESSURE: 98 MMHG | HEIGHT: 65 IN | HEART RATE: 81 BPM | OXYGEN SATURATION: 97 %

## 2024-05-20 DIAGNOSIS — I80.01 THROMBOPHLEBITIS OF SUPERFICIAL VEINS OF RIGHT LOWER EXTREMITY: ICD-10-CM

## 2024-05-20 DIAGNOSIS — E66.09 CLASS 1 OBESITY DUE TO EXCESS CALORIES WITHOUT SERIOUS COMORBIDITY WITH BODY MASS INDEX (BMI) OF 33.0 TO 33.9 IN ADULT: ICD-10-CM

## 2024-05-20 DIAGNOSIS — E66.3 OVERWEIGHT: ICD-10-CM

## 2024-05-20 DIAGNOSIS — I26.99 PULMONARY EMBOLISM WITHOUT ACUTE COR PULMONALE, UNSPECIFIED CHRONICITY, UNSPECIFIED PULMONARY EMBOLISM TYPE (HCC): ICD-10-CM

## 2024-05-20 DIAGNOSIS — E66.9 OBESITY, CLASS I, BMI 30-34.9: ICD-10-CM

## 2024-05-20 DIAGNOSIS — I87.2 VENOUS INSUFFICIENCY OF BOTH LOWER EXTREMITIES: Primary | ICD-10-CM

## 2024-05-20 DIAGNOSIS — E78.2 MIXED HYPERLIPIDEMIA: ICD-10-CM

## 2024-05-20 DIAGNOSIS — I83.811 VARICOSE VEINS OF LEG WITH PAIN, RIGHT: ICD-10-CM

## 2024-05-20 PROCEDURE — 99215 OFFICE O/P EST HI 40 MIN: CPT | Performed by: SURGERY

## 2024-05-20 NOTE — PROGRESS NOTES
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, migraine, hypothyroidism, anxiety, HLD, venous insufficiency    Pulmonary embolism without acute cor pulmonale, unspecified chronicity, unspecified pulmonary embolism type (HCC)  -maybe provoked DVT/PE (after travel, 4hr plane ride)  -single episodes, treated with anticoagulation and now on ASA ppx; saw heme and had hypercoag panel as well  -advised compression, frequent walking, hydration on upcoming trip    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  -hx of multiple SVT in her varicose veins  -advised to use warm compresses and NSAIDs if this occurs again    Varicose veins of leg with pain, right  Venous insufficiency  Overweight  -R>LLE varicose veins w/ aching, itching, mild edema  -reviewed reflux study from Jan '23 which shows reflux of the R GSV at the inguinal only and L GSV inguinal through mid thigh; she had AASV B with reflux tudy unclear if measurements are for the AASV or true GSV;  -on exam, varicosities appear to originate from the AASV at least on the R  -will get updated reflux testing; discussed possible surgical options of stab phlebectomies + either EVLT or high ligation depending on the results  -discussed that we need to optimize medical management with compression and elevation as well as the good work she had done with weight loss and activity; also advised skin care  -will get reflux in about 2 months and f/u after this    Subjective:     Patient ID: Cindy Gilbert is a 53 y.o. female.    Pt presents with Varicose Veins. C/O itchiness, painful to touch bulging veins MISBAH LE R>L.Pt does not wear compression and elevates when able to. Pt is taking ASA 81 MG and is a nonsmoker.     HPI:    Patient presents to discuss venous disease.    Patient complains of B varicose veins.  She has severe itching of the legs.  She has some pain in the bulging veins.    She has seen Dr. Bill and Dr Thacker in the past.    Over the past  "year, she has lost about 40lbs and has increased her exercise.  She does not wear compression and noted this is very uncomfortable for her.  Does not elevate.    Patient has a history of DVT/PE .  She has also had hx of SVT.    She is taking ASA daily        Review of Systems   Constitutional: Negative.    HENT: Negative.     Eyes: Negative.    Respiratory: Negative.     Cardiovascular: Negative.    Gastrointestinal: Negative.    Endocrine: Negative.    Genitourinary: Negative.    Musculoskeletal: Negative.    Skin: Negative.    Allergic/Immunologic: Negative.    Neurological: Negative.    Hematological: Negative.    Psychiatric/Behavioral: Negative.           Objective:     Physical Exam  Cardiovascular:      Rate and Rhythm: Normal rate and regular rhythm.      Pulses:           Radial pulses are 2+ on the right side and 2+ on the left side.        Dorsalis pedis pulses are 2+ on the right side and 2+ on the left side.        Posterior tibial pulses are 2+ on the right side and 2+ on the left side.      Heart sounds: No murmur heard.  Pulmonary:      Effort: No respiratory distress.      Breath sounds: No wheezing or rales.   Musculoskeletal:      Right lower le+ Edema present.      Left lower le+ Edema present.   Skin:     Comments: No stasis changes; no wounds  There is a large varicosity on the R anterior thigh, lateral knee, lateral calf; there is a smaller varicosity on the L medial thigh             I have reviewed and made appropriate changes to the review of systems input by the medical assistant.    Vitals:    24 1037   BP: 98/72   BP Location: Left arm   Patient Position: Sitting   Cuff Size: Standard   Pulse: 81   SpO2: 97%   Weight: 73.5 kg (162 lb)   Height: 5' 4.5\" (1.638 m)       Patient Active Problem List   Diagnosis   • Anxiety   • Keratosis pilaris   • Primary insomnia   • Varicose veins of leg with pain, right   • 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 "   • Pulmonary emboli (HCC)   • Migraines   • Borderline abnormal thyroid function test   • Mixed hyperlipidemia   • Class 1 obesity due to excess calories without serious comorbidity with body mass index (BMI) of 33.0 to 33.9 in adult   • Hypothyroidism   • Left lower quadrant abdominal pain   • Chest pain syndrome   • Abnormal EKG   • Overweight   • Venous insufficiency of both lower extremities       Past Surgical History:   Procedure Laterality Date   •  SECTION  2005    2nd son's birth   • COLONOSCOPY     • TUBAL LIGATION      after second son was born in    • UPPER GASTROINTESTINAL ENDOSCOPY         Family History   Problem Relation Age of Onset   • Other Mother         brain tumor   • Other Father         lung mass   • Hypertension Father    • Breast cancer Paternal Grandmother    • Diabetes Paternal Grandmother    • Substance Abuse Neg Hx    • Mental illness Neg Hx        Social History     Socioeconomic History   • Marital status: /Civil Union     Spouse name: Not on file   • Number of children: Not on file   • Years of education: Not on file   • Highest education level: Not on file   Occupational History   • Not on file   Tobacco Use   • Smoking status: Former     Current packs/day: 0.00     Average packs/day: 0.3 packs/day for 30.0 years (7.5 ttl pk-yrs)     Types: Cigarettes, E-Cigarettes     Start date:      Quit date: 2022     Years since quittin.7   • Smokeless tobacco: Never   • Tobacco comments:     havent smoked in a long time   Vaping Use   • Vaping status: Former   • Substances: Flavoring   Substance and Sexual Activity   • Alcohol use: Not Currently   • Drug use: Never   • Sexual activity: Yes     Partners: Male     Birth control/protection: Surgical     Comment: Tubal ligation   Other Topics Concern   • Not on file   Social History Narrative   • Not on file     Social Determinants of Health     Financial Resource Strain: Not on file   Food Insecurity: Not on  file   Transportation Needs: Not on file   Physical Activity: Not on file   Stress: Not on file   Social Connections: Not on file   Intimate Partner Violence: Not on file   Housing Stability: Not on file       Allergies   Allergen Reactions   • Iodine I 131 Tositumomab Rash     OK for CT per Dr Alcantar   • Doxycycline GI Intolerance and Vomiting   • Latex    • Other Hives         Current Outpatient Medications:   •  ALPRAZolam (XANAX) 0.5 mg tablet, Take 1 tablet (0.5 mg total) by mouth 4 (four) times a day as needed for anxiety, Disp: 120 tablet, Rfl: 5  •  aspirin (ECOTRIN LOW STRENGTH) 81 mg EC tablet, Take 1 tablet (81 mg total) by mouth in the morning, Disp: , Rfl:   •  clobetasol (TEMOVATE) 0.05 % ointment, 1 APPLICATIOIN TOPICALLY AS DIRECTED. APPLY SPARINGLY 2 TIMES A D...  (REFER TO PRESCRIPTION NOTES)., Disp: , Rfl:   •  dicyclomine (BENTYL) 20 mg tablet, take 1 tablet by mouth every 6 hours, Disp: 60 tablet, Rfl: 2  •  levothyroxine (Euthyrox) 75 mcg tablet, Take 1 tablet (75 mcg total) by mouth daily, Disp: 90 tablet, Rfl: 3  •  omeprazole (PriLOSEC) 20 mg delayed release capsule, take 1 capsule by mouth once daily, Disp: 30 capsule, Rfl: 5  •  tirzepatide (Zepbound) 7.5 mg/0.5 mL auto-injector, Inject 0.5 mL (7.5 mg total) under the skin once a week, Disp: 6 mL, Rfl: 0  •  Acetaminophen 500 MG, Take 2 capsules (1,000 mg total) by mouth every 6 (six) hours as needed for mild pain (Patient taking differently: Take 1,000 mg by mouth as needed for mild pain), Disp: 100 capsule, Rfl: 10  •  albuterol (Proventil HFA) 90 mcg/act inhaler, Inhale 2 puffs every 6 (six) hours as needed for wheezing or shortness of breath (Patient taking differently: Inhale 2 puffs as needed for wheezing or shortness of breath), Disp: 6.7 g, Rfl: 5  •  benzonatate (TESSALON) 200 MG capsule, Take 1 capsule (200 mg total) by mouth 3 (three) times a day as needed for cough (Patient taking differently: Take 200 mg by mouth as needed  for cough), Disp: 20 capsule, Rfl: 0  •  fluticasone (FLONASE) 50 mcg/act nasal spray, 1 spray into each nostril daily (Patient taking differently: 1 spray into each nostril as needed PRN), Disp: 11.1 mL, Rfl: 1  •  RA Acetaminophen Ex St 500 MG tablet, Take 1,000 mg by mouth every 6 (six) hours as needed, Disp: , Rfl:   •  Semaglutide-Weight Management (WEGOVY) 0.25 MG/0.5ML, Inject 0.5 mL (0.25 mg total) under the skin once a week (Patient not taking: Reported on 5/15/2024), Disp: 2 mL, Rfl: 0  •  tirzepatide (Zepbound) 10 mg/0.5 mL auto-injector, Inject 0.5 mL (10 mg total) under the skin once a week (Patient not taking: Reported on 5/15/2024), Disp: 6 mL, Rfl: 0  •  tirzepatide (Zepbound) 10 mg/0.5 mL auto-injector, Inject 0.5 mL (10 mg total) under the skin once a week for 28 days (Patient not taking: Reported on 5/15/2024), Disp: 2 mL, Rfl: 0  •  tirzepatide (Zepbound) 5 mg/0.5 mL auto-injector, Inject 0.5 mL (5 mg total) under the skin once a week (Patient not taking: Reported on 5/15/2024), Disp: 6 mL, Rfl: 0  •  tirzepatide (Zepbound) 5 mg/0.5 mL auto-injector, Inject 0.5 mL (5 mg total) under the skin once a week for 28 days (Patient not taking: Reported on 5/15/2024), Disp: 2 mL, Rfl: 0

## 2024-05-20 NOTE — PATIENT INSTRUCTIONS
"1) DVT/PE  -you had an episode of DVT/PE  -continue taking aspirin 81mg once daily for prevention    2) Venous insufficiency  -we are going to get an updated ultrasound to look at the vein valves  -we need to find a comfortable form of compression and increase your leg elevation; your exercise and weight loss is excellent; keep this up  -I will see you again in 6-8 wks to see how things are going and review your ultrasound and discuss surgical options    For compression:  Look for 20-30mmHg, knee high, footed, that says \"gradient compression\"  My favorite brand is Sigvaris, soft opaque  "

## 2024-05-21 RX ORDER — TIRZEPATIDE 10 MG/.5ML
10 INJECTION, SOLUTION SUBCUTANEOUS WEEKLY
Qty: 2 ML | Refills: 0 | Status: SHIPPED | OUTPATIENT
Start: 2024-05-21 | End: 2024-05-30 | Stop reason: SDUPTHER

## 2024-05-21 RX ORDER — TIRZEPATIDE 10 MG/.5ML
10 INJECTION, SOLUTION SUBCUTANEOUS WEEKLY
Qty: 6 ML | Refills: 0 | Status: SHIPPED | OUTPATIENT
Start: 2024-05-21 | End: 2024-05-30 | Stop reason: SDUPTHER

## 2024-05-21 NOTE — PATIENT COMMUNICATION
Please let the patient know that the Zepbound 10 mg was never discontinued by our office, but I resent it because I'm not sure why the pharmacy is telling her that. I heard back from [facility] regarding the TINA for 7.5 mg. I need to complete a peer to peer review for that.

## 2024-05-21 NOTE — PATIENT COMMUNICATION
Patient called [facility] to get the 10 mg Zepbound filled just in case her TINA doesn't go through for the 7.5 mg before she goes on vacation. [facility] told her the script was cancelled by the doctor's office and they need a new one sent.     Reason for call:   [x] Refill   [] Prior Auth  [] Other:     Office:   [] PCP/Provider -   [x] Specialty/Provider - Weight Management     Medication: Zepbound     Dose/Frequency: 10 mg/0.5 mL auto-injector. Inject 0.5 mL under the skin once a week     Quantity: 6 mL     Pharmacy: [facility] [phone]     Does the patient have enough for 3 days?   [] Yes   [x] No - Send as HP to POD

## 2024-05-26 LAB
ALBUMIN SERPL-MCNC: 3.8 G/DL (ref 3.6–5.1)
ALBUMIN/GLOB SERPL: 1.5 (CALC) (ref 1–2.5)
ALP SERPL-CCNC: 71 U/L (ref 37–153)
ALT SERPL-CCNC: 13 U/L (ref 6–29)
AST SERPL-CCNC: 15 U/L (ref 10–35)
BILIRUB SERPL-MCNC: 0.4 MG/DL (ref 0.2–1.2)
BUN SERPL-MCNC: 11 MG/DL (ref 7–25)
BUN/CREAT SERPL: NORMAL (CALC) (ref 6–22)
CALCIUM SERPL-MCNC: 8.7 MG/DL (ref 8.6–10.4)
CHLORIDE SERPL-SCNC: 107 MMOL/L (ref 98–110)
CO2 SERPL-SCNC: 27 MMOL/L (ref 20–32)
CREAT SERPL-MCNC: 0.69 MG/DL (ref 0.5–1.03)
GFR/BSA.PRED SERPLBLD CYS-BASED-ARV: 105 ML/MIN/1.73M2
GLOBULIN SER CALC-MCNC: 2.5 G/DL (CALC) (ref 1.9–3.7)
GLUCOSE SERPL-MCNC: 83 MG/DL (ref 65–99)
POTASSIUM SERPL-SCNC: 3.9 MMOL/L (ref 3.5–5.3)
PROT SERPL-MCNC: 6.3 G/DL (ref 6.1–8.1)
SODIUM SERPL-SCNC: 140 MMOL/L (ref 135–146)

## 2024-05-28 NOTE — RESULT ENCOUNTER NOTE
LMOM stated message from Doris JEAN regarding lab results will be sent to pt in pts MyChart. Please call with any questions or concerns.

## 2024-05-29 DIAGNOSIS — F41.9 ANXIETY: ICD-10-CM

## 2024-05-29 RX ORDER — ALPRAZOLAM 0.5 MG/1
0.5 TABLET ORAL 4 TIMES DAILY PRN
Qty: 120 TABLET | Refills: 0 | Status: SHIPPED | OUTPATIENT
Start: 2024-05-29

## 2024-05-30 ENCOUNTER — TELEPHONE (OUTPATIENT)
Dept: BARIATRICS | Facility: CLINIC | Age: 54
End: 2024-05-30

## 2024-05-30 ENCOUNTER — NURSE TRIAGE (OUTPATIENT)
Age: 54
End: 2024-05-30

## 2024-05-30 DIAGNOSIS — E66.09 CLASS 1 OBESITY DUE TO EXCESS CALORIES WITHOUT SERIOUS COMORBIDITY WITH BODY MASS INDEX (BMI) OF 33.0 TO 33.9 IN ADULT: ICD-10-CM

## 2024-05-30 DIAGNOSIS — E66.9 OBESITY, CLASS I, BMI 30-34.9: ICD-10-CM

## 2024-05-30 RX ORDER — TIRZEPATIDE 10 MG/.5ML
10 INJECTION, SOLUTION SUBCUTANEOUS WEEKLY
Qty: 6 ML | Refills: 0 | Status: SHIPPED | OUTPATIENT
Start: 2024-05-30 | End: 2024-06-07 | Stop reason: SDUPTHER

## 2024-05-30 RX ORDER — TIRZEPATIDE 10 MG/.5ML
10 INJECTION, SOLUTION SUBCUTANEOUS WEEKLY
Qty: 6 ML | Refills: 0 | Status: SHIPPED | OUTPATIENT
Start: 2024-05-30 | End: 2024-08-22

## 2024-05-30 NOTE — TELEPHONE ENCOUNTER
Regarding: VV w/ symptoms  ----- Message from Jewell BARAJAS sent at 5/30/2024  9:41 AM EDT -----  Pt is c/o extremely itchy vv, to the point where they are waking her up at night. Pt described the itching as under the skin, burning. Pt is using compression but stated it seems to be helping the flow, but making the feeling worse. Pt is hoping for a cream prescription or any suggestions to alleviate the itching. For prescription pt [facility] [place] would be the pharmacy of her choice.    Pt l/s 5/20/24 lmd. scheduled for LEVDR 7/15 and f/u 7/31. Pt okay'd a call or message through [employer].

## 2024-05-30 NOTE — TELEPHONE ENCOUNTER
"Answer Assessment - Initial Assessment Questions  1. DESCRIPTION: \"Describe the itching you are having.\" \"Where is it located?\"      R leg from ankle to mid thigh, mostly below the knee  2. SEVERITY: \"How bad is it?\"     - MILD - doesn't interfere with normal activities    - MODERATE-SEVERE: interferes with work, school, sleep, or other activities       Severe, pt unable to sleep  3. SCRATCHING: \"Are there any scratch marks? Bleeding?\"      Does not see rash, no warmth, no wounds or bleeding from intense scratching  4. ONSET: \"When did the itching begin?\"       She had at initial OV but it has become severe  5. CAUSE: \"What do you think is causing the itching?\"       Varicose veins, it has worsened since searing compression stockings  6. OTHER SYMPTOMS: \"Do you have any other symptoms?\"       No swelling  7. PREGNANCY: \"Is there any chance you are pregnant?\" \"When was your last menstrual period?\"      N/a    Protocols used: Itching - Localized-ADULT-[place]    "

## 2024-05-30 NOTE — TELEPHONE ENCOUNTER
Pt states she has had issues w/ comp hose in the past, they caused pain. She is wearing 20-30 compression stockings as prescribed at OV 5/20/24. The itching was present at last OV, it was occasional mostly occurring before bed.  Now it is constant, she is unable to sleep. She does not see rash or redness. She describes feeling as an army of ants crawling through her veins.  She is ? If she should continue the compression stockings. Also if there is any cream/med that can be prescribed for itching.  If so she would like it sent to [facility] in [place].     Per pt it is ok to leave vm or send message in my chart if she does not answer phone.

## 2024-05-30 NOTE — TELEPHONE ENCOUNTER
Chart reviewed  To confirm patient is not wearing compression stockings at night correct?   Are the compression stockings appropriate fit?  Was she measured?    Recommend keeping legs moisturized.    Can try OTC antihistamine, non- drowsy during the day such as Claritin or Benadryl at night.  If there was an area that appeared as rash or dryness, could also try OTC hydrocortisone cream.  No prescription necessary.  Encourage adequate hydration with water.

## 2024-06-03 ENCOUNTER — TELEPHONE (OUTPATIENT)
Dept: BARIATRICS | Facility: CLINIC | Age: 54
End: 2024-06-03

## 2024-06-03 NOTE — TELEPHONE ENCOUNTER
Can try breaks from compression  ?? if she feels this is exacerbating the symptoms, however given suspected reflux, would continue to wear as mush as tolerated.   .    Can consider Atarax  (hydroxyzine) script, however also has possibility of drowsiness.     Ice?    Does not sound as though acute SVT symptoms which she has experienced in past. Recs for that made at time of office visit.      Will await imaging and and recommendations based on findings.

## 2024-06-03 NOTE — TELEPHONE ENCOUNTER
Patient returned call , I read her yobani Marie recommendation.  Patient said she is doing all those things and the itching is not a superficial itching, but it is the veins that itch.   She will discuss at her visit after the doppler with [facility].

## 2024-06-03 NOTE — TELEPHONE ENCOUNTER
Pt returned the call. Informed her of below message from EMA Maya. Pt stated she is not wearing the compression stockings at night. She stated she has been properly fitted for the stockings a couple of times. Pt stated she has been applying moisturizer which does not help. She stated she cannot take Claritin nor Benadryl because it has made her very drowsy in the past and she cannot take these with the other medications she is currently taking. She also stated she has tried Hydrocortisone which did not help either and she has been increasing her fluid intake. Informed pt will send a message to our triage provider to review and advise.    Please either call or send a [employer] message to pt with new recommendations per pt request.

## 2024-06-05 ENCOUNTER — PATIENT MESSAGE (OUTPATIENT)
Dept: BARIATRICS | Facility: CLINIC | Age: 54
End: 2024-06-05

## 2024-06-07 ENCOUNTER — PATIENT MESSAGE (OUTPATIENT)
Dept: BARIATRICS | Facility: CLINIC | Age: 54
End: 2024-06-07

## 2024-06-07 DIAGNOSIS — E66.3 OVERWEIGHT: Primary | ICD-10-CM

## 2024-06-07 DIAGNOSIS — E66.09 CLASS 1 OBESITY DUE TO EXCESS CALORIES WITHOUT SERIOUS COMORBIDITY WITH BODY MASS INDEX (BMI) OF 33.0 TO 33.9 IN ADULT: ICD-10-CM

## 2024-06-07 DIAGNOSIS — Z86.39 HISTORY OF OBESITY: ICD-10-CM

## 2024-06-07 RX ORDER — TIRZEPATIDE 10 MG/.5ML
10 INJECTION, SOLUTION SUBCUTANEOUS WEEKLY
Qty: 2 ML | Refills: 2 | Status: SHIPPED | OUTPATIENT
Start: 2024-06-07 | End: 2024-08-30

## 2024-06-07 RX ORDER — TIRZEPATIDE 5 MG/.5ML
5 INJECTION, SOLUTION SUBCUTANEOUS WEEKLY
Qty: 2 ML | Refills: 2 | Status: SHIPPED | OUTPATIENT
Start: 2024-06-07 | End: 2024-08-30

## 2024-06-25 ENCOUNTER — NURSE TRIAGE (OUTPATIENT)
Dept: BARIATRICS | Facility: CLINIC | Age: 54
End: 2024-06-25

## 2024-06-25 NOTE — TELEPHONE ENCOUNTER
Patient requesting a script be sent to [facility] in [place] for Zepbound 12.5 mg as they will have that in stock tomorrow.     Patient wanted it clearly stated that none of the other scripts (in different doses) on file at [facility] should be deleted.     Patient is requesting that this be processed today.    Thank you.

## 2024-06-26 NOTE — TELEPHONE ENCOUNTER
"Pt called in following up on script for Zepbound 12.5mg and requesting it be sent to [facility] in [facility].     Denies any adverse side effects on 10mg and unsure of how much weight she has lost.   Reason for Disposition   Nursing judgment    Answer Assessment - Initial Assessment Questions  1. REASON FOR CALL or QUESTION: \"What is your reason for calling today?\" or \"How can I best help you?\" or \"What question do you have that I can help answer?\"      Zepbound 12.5mg script    Protocols used: Information Only Call - No Triage-ADULT-[place]    "

## 2024-06-26 NOTE — TELEPHONE ENCOUNTER
Patient called in to check status, advised her we are just waiting on the provider to sign off on it.

## 2024-06-27 ENCOUNTER — NURSE TRIAGE (OUTPATIENT)
Age: 54
End: 2024-06-27

## 2024-06-27 DIAGNOSIS — E78.2 MIXED HYPERLIPIDEMIA: ICD-10-CM

## 2024-06-27 DIAGNOSIS — E66.3 OVERWEIGHT: Primary | ICD-10-CM

## 2024-06-27 RX ORDER — TIRZEPATIDE 12.5 MG/.5ML
12.5 INJECTION, SOLUTION SUBCUTANEOUS WEEKLY
Qty: 2 ML | Refills: 1 | Status: SHIPPED | OUTPATIENT
Start: 2024-06-27 | End: 2024-08-22

## 2024-06-27 NOTE — TELEPHONE ENCOUNTER
"Patient calling regarding Zepbound 12.5mg being sent to [facility].  Notified patient that script was sent at 807 this am and verified receipt was attached.  Patient will call [facility] to verify.  Advised patient to call back if any problems.        Answer Assessment - Initial Assessment Questions  1. REASON FOR CALL or QUESTION: \"What is your reason for calling today?\" or \"How can I best help you?\" or \"What question do you have that I can help answer?\"      Patient calling regarding script being sent to [facility].    Protocols used: Information Only Call - No Triage-ADULT-[place]    "

## 2024-07-12 ENCOUNTER — TELEPHONE (OUTPATIENT)
Age: 54
End: 2024-07-12

## 2024-07-12 DIAGNOSIS — E78.2 MIXED HYPERLIPIDEMIA: ICD-10-CM

## 2024-07-12 DIAGNOSIS — E66.3 OVERWEIGHT: Primary | ICD-10-CM

## 2024-07-12 RX ORDER — TIRZEPATIDE 15 MG/.5ML
15 INJECTION, SOLUTION SUBCUTANEOUS WEEKLY
Qty: 6 ML | Refills: 0 | Status: SHIPPED | OUTPATIENT
Start: 2024-07-12 | End: 2024-07-15 | Stop reason: SDUPTHER

## 2024-07-12 NOTE — TELEPHONE ENCOUNTER
"Called patient to inform her of Cary's message. She is currently on 10mg Zepbound and is requesting 15mg before August 1st due to her insurance rules changing. She stated she wasn't sure if she should come in to her nurse visit next week because her BMI is almost less than 27. I put her on hold and spoke to Cary. She stated she will put in the 15mg Zepbound for 3 months until her next visit with Cary in October, but that she must be seen or else no refills will be sent. I transferred the call to Esther and she reviewed what I told her previously. She stated she will go to the \"higher-ups\" if the prior authorization for the Zepbound 15mg is not completed before July 31st.   "

## 2024-07-12 NOTE — PROGRESS NOTES
Sent in 15mg of zepbound 3 month supply.  Patient is currently on 10mg and will be increasing to 12.5.  Patient did not respond back to prior message letting provider know what dose she is on.  She does have enough medication until followup in October and will not get refills until that time.

## 2024-07-12 NOTE — TELEPHONE ENCOUNTER
Pt called in explaining her frustration with the way the situation has been handling. Pt wants to speak with the manager. Pt was told will receive a call from the practice on Monday.

## 2024-07-12 NOTE — TELEPHONE ENCOUNTER
Pt of Doris Melendez-  Calling in concern with upcoming prior auths for maintenance doses. Pt states [facility] says starting in August BMI >32 or >27 with 2 comorbid conditions. Pt states her current BMI reads 26 at home and she is unsure if should delay or cancel weight check scheduled 7/18 for insurance purposes. She would like a call regarding how she will be approved for maintenance doses with those requirements.   Pt also would like a 15mg dose script sent in as she states she is trying to prepare for August.     Pt would like a call today to discuss.

## 2024-07-15 ENCOUNTER — HOSPITAL ENCOUNTER (OUTPATIENT)
Dept: NON INVASIVE DIAGNOSTICS | Age: 54
Discharge: HOME | End: 2024-07-15
Payer: COMMERCIAL

## 2024-07-15 ENCOUNTER — TELEPHONE (OUTPATIENT)
Dept: BARIATRICS | Facility: CLINIC | Age: 54
End: 2024-07-15

## 2024-07-15 DIAGNOSIS — I87.2 VENOUS INSUFFICIENCY OF BOTH LOWER EXTREMITIES: ICD-10-CM

## 2024-07-15 DIAGNOSIS — E66.3 OVERWEIGHT: ICD-10-CM

## 2024-07-15 DIAGNOSIS — E78.2 MIXED HYPERLIPIDEMIA: ICD-10-CM

## 2024-07-15 PROCEDURE — 93970 EXTREMITY STUDY: CPT

## 2024-07-15 PROCEDURE — 93970 EXTREMITY STUDY: CPT | Performed by: SURGERY

## 2024-07-15 RX ORDER — TIRZEPATIDE 15 MG/.5ML
15 INJECTION, SOLUTION SUBCUTANEOUS WEEKLY
Qty: 6 ML | Refills: 0 | Status: SHIPPED | OUTPATIENT
Start: 2024-07-15 | End: 2024-07-25

## 2024-07-15 NOTE — TELEPHONE ENCOUNTER
Spoke to pt she did not need to r/s her 7/18/24 nurse appt she spoke to office last week and was told she did not need to keep the nurse appt

## 2024-07-15 NOTE — TELEPHONE ENCOUNTER
Spoke with patient regarding concerns and frustrations while interacting with office staff.  Patient states she felt rushed with a recent interaction with staff and feels that she has been ' shuffled around' due to her previous provider leaving the practice.  Pt also states that she feels there has been some miscommunication in notes sent regarding care. Apologized to patient that she had an interaction like this and that it would be addressed with employee in question.     Pt reports needing to use [facility] for any medications order, however, states that they have sent her a letter with specific guidelines that she will need to abide to, ensuring that she continues to receive Zepbound. Patient voiced frustrations with [facility] and uses [facility] as a back up.  Patient also expressed that she wants to 'stay on top of' this situation as she feels that the medication has been working extremely well.  Patient is requesting that the provider send a 90 day supply of the 15mg Zepbound to [facility] and keep the order at [facility] as back up.    Patient will send me the letter received from [facility].  Upon receipt, I will share with provider and director as to next steps.  Patient was given my direct line and email to contact with any further issues. Patient offered no additional questions at this time.

## 2024-07-17 ENCOUNTER — TELEPHONE (OUTPATIENT)
Dept: BARIATRICS | Facility: CLINIC | Age: 54
End: 2024-07-17

## 2024-07-17 NOTE — TELEPHONE ENCOUNTER
Spoke with patient and advised to reach out to [facility] to initiate the new program that [employer] has in place for members on this medication.  Per representative at [employer], the member must reach out to [facility] prior to an initiation of a prior authorization, or the medication will be denied.    The above was explained to patient; no additional questions offered.  Cindy will reach out to me once this is complete and I will make our MA staff aware.    [facility]- [phone]

## 2024-07-22 ENCOUNTER — TELEPHONE (OUTPATIENT)
Dept: BARIATRICS | Facility: CLINIC | Age: 54
End: 2024-07-22

## 2024-07-24 ENCOUNTER — TELEPHONE (OUTPATIENT)
Dept: BARIATRICS | Facility: CLINIC | Age: 54
End: 2024-07-24

## 2024-07-24 NOTE — TELEPHONE ENCOUNTER
Patient is requesting to stay on the Zepbound 10mg. Can you please send in a new script in to the [facility] pharmacy on file already.

## 2024-07-25 DIAGNOSIS — E66.09 CLASS 1 OBESITY DUE TO EXCESS CALORIES WITHOUT SERIOUS COMORBIDITY WITH BODY MASS INDEX (BMI) OF 33.0 TO 33.9 IN ADULT: Primary | ICD-10-CM

## 2024-07-25 RX ORDER — TIRZEPATIDE 10 MG/.5ML
10 INJECTION, SOLUTION SUBCUTANEOUS WEEKLY
Qty: 2 ML | Refills: 1 | Status: SHIPPED | OUTPATIENT
Start: 2024-07-25 | End: 2024-09-19

## 2024-07-25 NOTE — TELEPHONE ENCOUNTER
Zepbound dose was decreased to 10mg.  She was not able to get 15mg due to supply shortage and insurance only allows 12.5mg as step therapy

## 2024-07-29 ENCOUNTER — CLINICAL SUPPORT (OUTPATIENT)
Dept: BARIATRICS | Facility: CLINIC | Age: 54
End: 2024-07-29

## 2024-07-29 VITALS
TEMPERATURE: 96.6 F | BODY MASS INDEX: 25.59 KG/M2 | RESPIRATION RATE: 16 BRPM | HEIGHT: 65 IN | HEART RATE: 76 BPM | SYSTOLIC BLOOD PRESSURE: 100 MMHG | WEIGHT: 153.6 LBS | DIASTOLIC BLOOD PRESSURE: 60 MMHG

## 2024-07-29 DIAGNOSIS — R63.5 ABNORMAL WEIGHT GAIN: Primary | ICD-10-CM

## 2024-07-29 PROCEDURE — RECHECK

## 2024-07-29 NOTE — PROGRESS NOTES
Patient last visit weight: 164 lb 9.6 oz  Patient current visit weight: 153.6 lb    If you are taking phentermine or other oral weight loss medications, are you experiencing any of the following symptoms:  Headache:   Blurred Vision:   Chest Pain:   Palpitations:  Insomnia:   SPECIFY ORAL MEDICATION AND DOSAGE:     If you are taking an injectable medication,  are you experiencing any of the following symptoms:  Bloating: NO  Nausea:NO  Vomiting: NO  Constipation: NO  Diarrhea:NO  SPECIFY INJECTABLE MEDICATION AND CURRENT DOSAGE: ZEPBOUND 10MG      Vitals:    Is BP less than 100/60?NO  Is BP greater than 140/90?NO  Is HR greater than 100?NO  **If yes to any of the above, have patient relax and repeat in 5-10 minutes**    Repeat values:    Is BP less than 100/60?  Is BP greater than 140/90?  Is HR greater than 100?  **If values remain outside of ranges above, please consult provider for next steps**

## 2024-07-31 ENCOUNTER — OFFICE VISIT (OUTPATIENT)
Dept: VASCULAR SURGERY | Facility: CLINIC | Age: 54
End: 2024-07-31
Payer: COMMERCIAL

## 2024-07-31 VITALS
HEIGHT: 65 IN | DIASTOLIC BLOOD PRESSURE: 72 MMHG | WEIGHT: 153 LBS | SYSTOLIC BLOOD PRESSURE: 100 MMHG | HEART RATE: 84 BPM | OXYGEN SATURATION: 97 % | BODY MASS INDEX: 25.49 KG/M2

## 2024-07-31 DIAGNOSIS — I26.99 PULMONARY EMBOLISM WITHOUT ACUTE COR PULMONALE, UNSPECIFIED CHRONICITY, UNSPECIFIED PULMONARY EMBOLISM TYPE (HCC): Primary | ICD-10-CM

## 2024-07-31 DIAGNOSIS — I87.2 VENOUS INSUFFICIENCY OF BOTH LOWER EXTREMITIES: ICD-10-CM

## 2024-07-31 DIAGNOSIS — I80.01 THROMBOPHLEBITIS OF SUPERFICIAL VEINS OF RIGHT LOWER EXTREMITY: ICD-10-CM

## 2024-07-31 DIAGNOSIS — I83.811 VARICOSE VEINS OF LEG WITH PAIN, RIGHT: ICD-10-CM

## 2024-07-31 PROBLEM — E66.09 CLASS 1 OBESITY DUE TO EXCESS CALORIES WITHOUT SERIOUS COMORBIDITY WITH BODY MASS INDEX (BMI) OF 33.0 TO 33.9 IN ADULT: Status: RESOLVED | Noted: 2023-08-15 | Resolved: 2024-07-31

## 2024-07-31 PROBLEM — E66.811 CLASS 1 OBESITY DUE TO EXCESS CALORIES WITHOUT SERIOUS COMORBIDITY WITH BODY MASS INDEX (BMI) OF 33.0 TO 33.9 IN ADULT: Status: RESOLVED | Noted: 2023-08-15 | Resolved: 2024-07-31

## 2024-07-31 PROCEDURE — 99214 OFFICE O/P EST MOD 30 MIN: CPT | Performed by: SURGERY

## 2024-07-31 NOTE — PROGRESS NOTES
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, migraine, hypothyroidism, anxiety, HLD, venous insufficiency    Pulmonary embolism without acute cor pulmonale, unspecified chronicity, unspecified pulmonary embolism type (HCC)  -maybe provoked DVT/PE (after travel, 4hr plane ride)  -single episode, treated with anticoagulation x 9mos and now on ASA ppx; saw heme and had hypercoag panel as well    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  -hx of multiple SVT in her varicose veins  -advised to use warm compresses and NSAIDs if this occurs again    Varicose veins of leg with pain, right  Venous insufficiency  -R>LLE varicose veins w/ aching, itching, mild edema  -reviewed reflux study from Jan '23 which shows reflux of the R GSV at the inguinal only and L GSV inguinal through mid thigh; she had AASV B with reflux tudy unclear if measurements are for the AASV or true GSV;  -on exam, varicosities appear to originate from the AASV B  -reviewed reflux study which shows reflux in the B CFV, R GSV only at the inguinal level, L GSV inguinal to mid thigh, and B AASV, R>L  -discussed that we need to optimize medical management with compression and elevation as well as the good work she had done with weight loss and activity; also discussed that given that her pain is severe and the medical measures such as compression and elevation do not seem to relieve her symptoms, there is a high likelihood that there is a secondary source of pain other than mild-moderate venous insufficiency; as far as her insufficiency goes, this is unfortunately very dispersed across her venous system and I do not think there is a venous intervention that is going to significantly relieve her symptoms; she is interested in cosmetic treatment of her varicose veins and I have advised her to seek consultation for this elsewhere because we do not offer cosmetic-only varicose vein surgery at our practice  -f/u  PRN    Subjective:     Patient ID: Cindy Gilbert is a 54 y.o. female.    HPI:    Patient returns to discuss venous disease and review testing.    Patient complains of B varicose veins.  She has severe itching of the legs.  She has some pain in the bulging veins.    She has seen Dr. Bill and Dr Thacker in the past.    Over the past year, she has lost about 40lbs and has increased her exercise.  She is walking several hours a day with her dog daily.  She does not wear compression (tried initially but thinks they don't help and not wearing currently) and noted this is very uncomfortable for her.  Does not elevate.    Patient has a history of DVT/PE .  She has also had hx of SVT.    She is taking ASA daily        Pt presents to review LEVDR on 07/15/2024. Pt c/o bulging varicose veins MISBAH LE R>L w/ heaviness, fatigue. Pt wears compression intermittently. Pt is taking ASA 81 MG and is a nonsmoker.    Review of Systems   Constitutional: Negative.    HENT: Negative.     Eyes: Negative.    Respiratory: Negative.     Cardiovascular: Negative.    Gastrointestinal: Negative.    Endocrine: Negative.    Genitourinary: Negative.    Musculoskeletal: Negative.    Skin: Negative.    Allergic/Immunologic: Negative.    Neurological: Negative.    Hematological: Negative.    Psychiatric/Behavioral: Negative.           Objective:     Physical Exam  Cardiovascular:      Rate and Rhythm: Normal rate and regular rhythm.      Pulses:           Radial pulses are 2+ on the right side and 2+ on the left side.        Dorsalis pedis pulses are 2+ on the right side and 2+ on the left side.        Posterior tibial pulses are 2+ on the right side and 2+ on the left side.      Heart sounds: No murmur heard.  Pulmonary:      Effort: No respiratory distress.      Breath sounds: No wheezing or rales.   Musculoskeletal:      Right lower le+ Edema present.      Left lower le+ Edema present.   Skin:     Comments: No stasis changes; no  "wounds  There is a large varicosity on the R anterior thigh, lateral knee, lateral calf; there is a smaller varicosity on the L medial thigh           I have reviewed and made appropriate changes to the review of systems input by the medical assistant.    Vitals:    24 1535   BP: 100/72   BP Location: Left arm   Patient Position: Sitting   Cuff Size: Standard   Pulse: 84   SpO2: 97%   Weight: 69.4 kg (153 lb)   Height: 5' 4.5\" (1.638 m)       Patient Active Problem List   Diagnosis   • Anxiety   • Keratosis pilaris   • Primary insomnia   • Varicose veins of leg with pain, right   • 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   • Pulmonary emboli (HCC)   • Migraines   • Borderline abnormal thyroid function test   • Mixed hyperlipidemia   • Hypothyroidism   • Left lower quadrant abdominal pain   • Chest pain syndrome   • Abnormal EKG   • Overweight   • Venous insufficiency of both lower extremities       Past Surgical History:   Procedure Laterality Date   •  SECTION  2005    2nd son's birth   • COLONOSCOPY     • TUBAL LIGATION      after second son was born in    • UPPER GASTROINTESTINAL ENDOSCOPY         Family History   Problem Relation Age of Onset   • Other Mother         brain tumor   • Other Father         lung mass   • Hypertension Father    • Breast cancer Paternal Grandmother    • Diabetes Paternal Grandmother    • Substance Abuse Neg Hx    • Mental illness Neg Hx        Social History     Socioeconomic History   • Marital status: /Civil Union     Spouse name: Not on file   • Number of children: Not on file   • Years of education: Not on file   • Highest education level: Not on file   Occupational History   • Not on file   Tobacco Use   • Smoking status: Former     Current packs/day: 0.00     Average packs/day: 0.3 packs/day for 30.0 years (7.5 ttl pk-yrs)     Types: Cigarettes, E-Cigarettes     Start date:      Quit date: 2022     Years since " quittin.9   • Smokeless tobacco: Never   • Tobacco comments:     havent smoked in a long time   Vaping Use   • Vaping status: Former   • Substances: Flavoring   Substance and Sexual Activity   • Alcohol use: Not Currently   • Drug use: Never   • Sexual activity: Yes     Partners: Male     Birth control/protection: Surgical     Comment: Tubal ligation   Other Topics Concern   • Not on file   Social History Narrative   • Not on file     Social Determinants of Health     Financial Resource Strain: Not on file   Food Insecurity: Not on file   Transportation Needs: Not on file   Physical Activity: Not on file   Stress: Not on file   Social Connections: Not on file   Intimate Partner Violence: Not on file   Housing Stability: Not on file       Allergies   Allergen Reactions   • Iodine I 131 Tositumomab Rash     OK for CT per Dr Alcantar   • Doxycycline GI Intolerance and Vomiting   • Latex    • Other Hives         Current Outpatient Medications:   •  ALPRAZolam (XANAX) 0.5 mg tablet, Take 1 tablet (0.5 mg total) by mouth 4 (four) times a day as needed for anxiety, Disp: 120 tablet, Rfl: 0  •  aspirin (ECOTRIN LOW STRENGTH) 81 mg EC tablet, Take 1 tablet (81 mg total) by mouth in the morning, Disp: , Rfl:   •  clobetasol (TEMOVATE) 0.05 % ointment, if needed, Disp: , Rfl:   •  dicyclomine (BENTYL) 20 mg tablet, take 1 tablet by mouth every 6 hours (Patient taking differently: Take 20 mg by mouth if needed), Disp: 60 tablet, Rfl: 2  •  levothyroxine (Euthyrox) 75 mcg tablet, Take 1 tablet (75 mcg total) by mouth daily, Disp: 90 tablet, Rfl: 3  •  omeprazole (PriLOSEC) 20 mg delayed release capsule, take 1 capsule by mouth once daily, Disp: 30 capsule, Rfl: 5  •  tirzepatide (Zepbound) 10 mg/0.5 mL auto-injector, Inject 0.5 mL (10 mg total) under the skin once a week, Disp: 2 mL, Rfl: 1  •  tirzepatide (Zepbound) 12.5 mg/0.5 mL auto-injector, Inject 0.5 mL (12.5 mg total) under the skin once a week (Patient not taking:  Reported on 7/29/2024), Disp: 2 mL, Rfl: 1

## 2024-08-06 ENCOUNTER — TELEPHONE (OUTPATIENT)
Age: 54
End: 2024-08-06

## 2024-08-06 DIAGNOSIS — E66.09 CLASS 1 OBESITY DUE TO EXCESS CALORIES WITHOUT SERIOUS COMORBIDITY WITH BODY MASS INDEX (BMI) OF 33.0 TO 33.9 IN ADULT: Primary | ICD-10-CM

## 2024-08-06 RX ORDER — TIRZEPATIDE 15 MG/.5ML
15 INJECTION, SOLUTION SUBCUTANEOUS WEEKLY
Qty: 6 ML | Refills: 0 | Status: SHIPPED | OUTPATIENT
Start: 2024-08-06 | End: 2024-10-29

## 2024-08-06 NOTE — TELEPHONE ENCOUNTER
Cindy Petersen    Patient is asking for the Next dose of medication  tirzepatide (Zepbound) 15 mg/0.5 mL auto-injector     Pharmacy:  [facility] [address] - [address], [place] [address]  Phone: [phone]  Fax: [phone]  AYDEE #: --     CB: [phone]

## 2024-08-07 DIAGNOSIS — F41.9 ANXIETY: ICD-10-CM

## 2024-08-08 ENCOUNTER — OFFICE VISIT (OUTPATIENT)
Dept: FAMILY MEDICINE CLINIC | Facility: CLINIC | Age: 54
End: 2024-08-08
Payer: COMMERCIAL

## 2024-08-08 VITALS
BODY MASS INDEX: 25.16 KG/M2 | SYSTOLIC BLOOD PRESSURE: 98 MMHG | OXYGEN SATURATION: 94 % | TEMPERATURE: 97.2 F | DIASTOLIC BLOOD PRESSURE: 54 MMHG | RESPIRATION RATE: 18 BRPM | WEIGHT: 151 LBS | HEART RATE: 94 BPM | HEIGHT: 65 IN

## 2024-08-08 DIAGNOSIS — I26.02 ACUTE SADDLE PULMONARY EMBOLISM WITH ACUTE COR PULMONALE (HCC): Primary | ICD-10-CM

## 2024-08-08 DIAGNOSIS — E03.8 OTHER SPECIFIED HYPOTHYROIDISM: ICD-10-CM

## 2024-08-08 DIAGNOSIS — I80.01 THROMBOPHLEBITIS OF SUPERFICIAL VEINS OF RIGHT LOWER EXTREMITY: ICD-10-CM

## 2024-08-08 DIAGNOSIS — E78.2 MIXED HYPERLIPIDEMIA: ICD-10-CM

## 2024-08-08 DIAGNOSIS — F41.9 ANXIETY: ICD-10-CM

## 2024-08-08 PROCEDURE — 99214 OFFICE O/P EST MOD 30 MIN: CPT | Performed by: FAMILY MEDICINE

## 2024-08-08 RX ORDER — ALPRAZOLAM 0.5 MG/1
0.5 TABLET ORAL 4 TIMES DAILY PRN
Qty: 120 TABLET | Refills: 5 | Status: SHIPPED | OUTPATIENT
Start: 2024-08-08

## 2024-08-08 NOTE — PROGRESS NOTES
"Assessment/Plan:    No problem-specific Assessment & Plan notes found for this encounter.       Diagnoses and all orders for this visit:    Acute saddle pulmonary embolism with acute cor pulmonale (HCC)    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    Mixed hyperlipidemia          Subjective:   Chief Complaint   Patient presents with    Medication Management        Patient ID: Cindy Gilbert is a 54 y.o. female.    Pt developed red/sore veins--bilat---3 days ago--pain 7/10---started motrin/heat/full leg support        The following portions of the patient's history were reviewed and updated as appropriate: allergies, current medications, past family history, past medical history, past social history, past surgical history and problem list.    Review of Systems   Constitutional:  Negative for fatigue, fever and unexpected weight change.   HENT:  Negative for congestion, sinus pain and sore throat.    Eyes:  Negative for visual disturbance.   Respiratory:  Negative for shortness of breath and wheezing.    Cardiovascular:  Negative for chest pain and palpitations.   Gastrointestinal:  Negative for abdominal pain, nausea and vomiting.   Musculoskeletal: Negative.  Negative for arthralgias and myalgias.   Neurological:  Negative for syncope, weakness and numbness.   Psychiatric/Behavioral: Negative.  Negative for confusion, dysphoric mood and suicidal ideas.          Objective:  Vitals:    08/08/24 1121   BP: 98/54   BP Location: Left arm   Patient Position: Sitting   Cuff Size: Standard   Pulse: 94   Resp: 18   Temp: (!) 97.2 °F (36.2 °C)   TempSrc: Tympanic   SpO2: 94%   Weight: 68.5 kg (151 lb)   Height: 5' 4.5\" (1.638 m)      Physical Exam  Constitutional:       Appearance: She is well-developed.   HENT:      Right Ear: Ear canal normal. Tympanic membrane is not injected.      Left Ear: Ear canal normal. Tympanic membrane is not injected.      Nose: Nose normal.   Eyes:      General:         " Right eye: No discharge.         Left eye: No discharge.      Conjunctiva/sclera: Conjunctivae normal.      Pupils: Pupils are equal, round, and reactive to light.   Neck:      Thyroid: No thyromegaly.   Cardiovascular:      Rate and Rhythm: Normal rate and regular rhythm.      Heart sounds: Normal heart sounds. No murmur heard.  Pulmonary:      Effort: Pulmonary effort is normal. No respiratory distress.      Breath sounds: Normal breath sounds. No wheezing.   Abdominal:      General: Bowel sounds are normal. There is no distension.      Palpations: Abdomen is soft.      Tenderness: There is no abdominal tenderness.   Musculoskeletal:         General: Normal range of motion.      Cervical back: Normal range of motion and neck supple.   Lymphadenopathy:      Cervical: No cervical adenopathy.   Skin:     General: Skin is warm and dry.   Neurological:      Mental Status: She is alert and oriented to person, place, and time. She is not disoriented.      Sensory: No sensory deficit.      Motor: No weakness.      Coordination: Coordination normal.      Gait: Gait normal.      Deep Tendon Reflexes: Reflexes are normal and symmetric.   Psychiatric:         Speech: Speech normal.         Behavior: Behavior normal.         Thought Content: Thought content normal.         Judgment: Judgment normal.       Bilat superficial phlebitis R/L leg

## 2024-08-09 LAB
ALBUMIN SERPL-MCNC: 4.1 G/DL (ref 3.6–5.1)
ALBUMIN/GLOB SERPL: 1.5 (CALC) (ref 1–2.5)
ALP SERPL-CCNC: 67 U/L (ref 37–153)
ALT SERPL-CCNC: 9 U/L (ref 6–29)
AST SERPL-CCNC: 12 U/L (ref 10–35)
BILIRUB SERPL-MCNC: 0.4 MG/DL (ref 0.2–1.2)
BUN SERPL-MCNC: 15 MG/DL (ref 7–25)
BUN/CREAT SERPL: NORMAL (CALC) (ref 6–22)
CALCIUM SERPL-MCNC: 9.3 MG/DL (ref 8.6–10.4)
CHLORIDE SERPL-SCNC: 105 MMOL/L (ref 98–110)
CO2 SERPL-SCNC: 27 MMOL/L (ref 20–32)
CREAT SERPL-MCNC: 0.67 MG/DL (ref 0.5–1.03)
GFR/BSA.PRED SERPLBLD CYS-BASED-ARV: 105 ML/MIN/1.73M2
GLOBULIN SER CALC-MCNC: 2.8 G/DL (CALC) (ref 1.9–3.7)
GLUCOSE SERPL-MCNC: 89 MG/DL (ref 65–99)
POTASSIUM SERPL-SCNC: 4.6 MMOL/L (ref 3.5–5.3)
PROT SERPL-MCNC: 6.9 G/DL (ref 6.1–8.1)
SODIUM SERPL-SCNC: 138 MMOL/L (ref 135–146)
T4 FREE SERPL-MCNC: 1.4 NG/DL (ref 0.8–1.8)
TSH SERPL-ACNC: 1.87 MIU/L

## 2024-08-26 DIAGNOSIS — E66.01 CLASS 3 OBESITY (HCC): Primary | ICD-10-CM

## 2024-08-26 RX ORDER — TIRZEPATIDE 10 MG/.5ML
10 INJECTION, SOLUTION SUBCUTANEOUS WEEKLY
Qty: 2 ML | Refills: 5 | Status: SHIPPED | OUTPATIENT
Start: 2024-08-26

## 2024-08-26 RX ORDER — TIRZEPATIDE 5 MG/.5ML
5 INJECTION, SOLUTION SUBCUTANEOUS WEEKLY
Qty: 2 ML | Refills: 5 | Status: SHIPPED | OUTPATIENT
Start: 2024-08-26

## 2024-09-09 ENCOUNTER — TELEPHONE (OUTPATIENT)
Dept: BARIATRICS | Facility: CLINIC | Age: 54
End: 2024-09-09

## 2024-09-09 ENCOUNTER — TELEPHONE (OUTPATIENT)
Age: 54
End: 2024-09-09

## 2024-09-09 NOTE — TELEPHONE ENCOUNTER
PA for SUBMITTED - Zepbound 5MG/0.5ML pen-injectors    via    [x]CMM-KEY: [id number]  []Surescripts-Case ID #   []Faxed to plan   []Other website   []Phone call Case ID #     Office notes sent, clinical questions answered. Awaiting determination    Turnaround time for your insurance to make a decision on your Prior Authorization can take 7-21 business days.

## 2024-09-09 NOTE — TELEPHONE ENCOUNTER
Reason for call:   [x] Prior Auth  [] Other:     Caller:  [x] Patient  [] Pharmacy  Name:   Address:   Callback Number:     Medication: Zepbound 5 mg    Dose/Frequency: inject 0.5 mg under skin once a week    Quantity: 2 ml    Ordering Provider:   [x] PCP/Provider -   [] Speciality/Provider -     Has the patient tried other medications and failed? If failed, which medications did they fail?    [] No   [] Yes - has been approved in past for this medication    Is the patient's insurance updated in EPIC?   [x] Yes   [] No     Is a copy of the patient's insurance scanned in EPIC?   [x] Yes   [] No     Reason for call:   [x] Prior Auth  [] Other:     Caller:  [x] Patient  [] Pharmacy  Name:   Address:   Callback Number:     Medication: Zepbound 10 mg    Dose/Frequency: inject 0.5 ml under the skin once a week    Quantity: 2 ml    Ordering Provider:   [x] PCP/Provider -   [] Speciality/Provider -     Has the patient tried other medications and failed? If failed, which medications did they fail?    [] No   [x] Yes - previous prior auth for same medication     Is the patient's insurance updated in EPIC?   [x] Yes   [] No     Is a copy of the patient's insurance scanned in EPIC?   [x] Yes   [] No

## 2024-09-09 NOTE — TELEPHONE ENCOUNTER
PA for Zepbound 15 mg SUBMITTED     via    [x]CMM-KEY: [id number]  [][employer]-Case ID #    []Faxed to plan   []Other website    []Phone call Case ID #      Office notes sent, clinical questions answered. Awaiting determination    Turnaround time for your insurance to make a decision on your Prior Authorization can take 7-21 business days.

## 2024-09-10 NOTE — TELEPHONE ENCOUNTER
PA for Zepbound 15 mg  APPROVED     Date(s) approved 8/10/2024-9/9/2025    Case #    Patient advised by          [x]Scrippedt Message  []Phone call   []LMOM  []L/M to call office as no active Communication consent on file  []Unable to leave detailed message as VM not approved on Communication consent       Pharmacy advised by    []Fax  [x]Phone call    Approval letter scanned into Media No - On covermy meds

## 2024-09-12 ENCOUNTER — TELEPHONE (OUTPATIENT)
Age: 54
End: 2024-09-12

## 2024-09-12 NOTE — TELEPHONE ENCOUNTER
Patient calling stating her 10/9/2024 would have to rescheduled due to starting a new job and would need something early am or after 4:30. No slots availability prior to March in the system.  Please assit with scheduling told by patient she's to follow up with the doctor only.

## 2024-09-26 ENCOUNTER — TELEPHONE (OUTPATIENT)
Dept: FAMILY MEDICINE CLINIC | Facility: CLINIC | Age: 54
End: 2024-09-26

## 2024-09-27 ENCOUNTER — OFFICE VISIT (OUTPATIENT)
Dept: FAMILY MEDICINE CLINIC | Facility: CLINIC | Age: 54
End: 2024-09-27
Payer: COMMERCIAL

## 2024-09-27 VITALS
DIASTOLIC BLOOD PRESSURE: 72 MMHG | BODY MASS INDEX: 23.83 KG/M2 | OXYGEN SATURATION: 99 % | HEART RATE: 107 BPM | SYSTOLIC BLOOD PRESSURE: 94 MMHG | TEMPERATURE: 98.1 F | WEIGHT: 141 LBS

## 2024-09-27 DIAGNOSIS — E66.813 CLASS 3 OBESITY: ICD-10-CM

## 2024-09-27 DIAGNOSIS — E06.3 HYPOTHYROIDISM DUE TO HASHIMOTO'S THYROIDITIS: Primary | ICD-10-CM

## 2024-09-27 DIAGNOSIS — Z00.00 WELLNESS EXAMINATION: ICD-10-CM

## 2024-09-27 DIAGNOSIS — F41.9 ANXIETY: ICD-10-CM

## 2024-09-27 DIAGNOSIS — F51.01 PRIMARY INSOMNIA: ICD-10-CM

## 2024-09-27 DIAGNOSIS — E66.01 CLASS 3 OBESITY: ICD-10-CM

## 2024-09-27 DIAGNOSIS — E03.8 HYPOTHYROIDISM DUE TO HASHIMOTO'S THYROIDITIS: Primary | ICD-10-CM

## 2024-09-27 DIAGNOSIS — E66.3 OVERWEIGHT: ICD-10-CM

## 2024-09-27 DIAGNOSIS — I83.811 VARICOSE VEINS OF LEG WITH PAIN, RIGHT: ICD-10-CM

## 2024-09-27 PROCEDURE — 99396 PREV VISIT EST AGE 40-64: CPT | Performed by: FAMILY MEDICINE

## 2024-09-27 PROCEDURE — 99214 OFFICE O/P EST MOD 30 MIN: CPT | Performed by: FAMILY MEDICINE

## 2024-09-27 RX ORDER — TIRZEPATIDE 10 MG/.5ML
10 INJECTION, SOLUTION SUBCUTANEOUS WEEKLY
Qty: 6 ML | Refills: 3 | Status: SHIPPED | OUTPATIENT
Start: 2024-09-27

## 2024-09-27 NOTE — PROGRESS NOTES
Adult Annual Physical  Name: Cindy Gilbert      : 1970      MRN: [id number]  Encounter Provider: Dandre Flores MD  Encounter Date: 2024   Encounter department: [facility]    Assessment & Plan  Hypothyroidism due to Hashimoto's thyroiditis         Anxiety         Primary insomnia         Overweight  At goal wt --pt will cont Zep 10 for maint         Class 3 obesity    Orders:    tirzepatide (Zepbound) 10 mg/0.5 mL auto-injector; Inject 0.5 mL (10 mg total) under the skin once a week    Wellness examination         Varicose veins of leg with pain, right  C/o ongoing pain R lateral lower leg---also on L  but R leg pain worse--intermittent superficial phlebitis         Immunizations and preventive care screenings were discussed with patient today. Appropriate education was printed on patient's after visit summary.    Counseling:  Alcohol/drug use: discussed moderation in alcohol intake, the recommendations for healthy alcohol use, and avoidance of illicit drug use.      Depression Screening and Follow-up Plan: Patient was screened for depression during today's encounter. They screened negative with a PHQ-2 score of 0.        History of Present Illness     Adult Annual Physical:  Patient presents for annual physical.     Diet and Physical Activity:  - Diet/Nutrition: well balanced diet and portion control.  - Exercise: moderate cardiovascular exercise and 30-60 minutes on average.    Depression Screening:  - PHQ-2 Score: 0    General Health:  - Sleep: 7-8 hours of sleep on average.  - Hearing: normal hearing bilateral ears.  - Vision: no vision problems and goes for regular eye exams.  - Dental: regular dental visits and brushes teeth twice daily.    /GYN Health:  - Follows with GYN: yes.   - Menopause: perimenopausal.   - History of STDs: no    Advanced Care Planning:  - Has an advanced directive?: no    - Has a durable medical POA?: no    - ACP document given to  patient?: no      Review of Systems   Constitutional:  Negative for appetite change and fatigue.   HENT:  Negative for ear pain, postnasal drip, sinus pressure and sore throat.    Eyes:  Negative for redness and visual disturbance.   Respiratory:  Negative for cough, chest tightness, shortness of breath and wheezing.    Cardiovascular:  Negative for chest pain, palpitations and leg swelling.   Gastrointestinal:  Negative for abdominal pain, blood in stool, constipation, diarrhea, nausea and vomiting.   Genitourinary:  Negative for difficulty urinating, flank pain, hematuria and urgency.   Musculoskeletal:  Negative for arthralgias, back pain, joint swelling and myalgias.   Skin:  Negative for rash and wound.        No suspicious skin lesions   Neurological:  Negative for light-headedness, numbness and headaches.   Psychiatric/Behavioral:  Negative for confusion, decreased concentration, sleep disturbance and suicidal ideas. The patient is not nervous/anxious.          Objective     BP 94/72 (BP Location: Left arm, Patient Position: Sitting, Cuff Size: Standard)   Pulse (!) 107   Temp 98.1 °F (36.7 °C) (Tympanic)   Wt 64 kg (141 lb)   SpO2 99%   BMI 23.83 kg/m²     Physical Exam  Constitutional:       Appearance: She is well-developed.   HENT:      Right Ear: Ear canal normal. Tympanic membrane is not injected.      Left Ear: Ear canal normal. Tympanic membrane is not injected.      Nose: Nose normal.   Eyes:      General:         Right eye: No discharge.         Left eye: No discharge.      Conjunctiva/sclera: Conjunctivae normal.      Pupils: Pupils are equal, round, and reactive to light.   Neck:      Thyroid: No thyromegaly.   Cardiovascular:      Rate and Rhythm: Normal rate and regular rhythm.      Heart sounds: Normal heart sounds. No murmur heard.  Pulmonary:      Effort: Pulmonary effort is normal. No respiratory distress.      Breath sounds: Normal breath sounds. No wheezing.   Abdominal:      General:  Bowel sounds are normal. There is no distension.      Palpations: Abdomen is soft.      Tenderness: There is no abdominal tenderness.   Musculoskeletal:         General: Normal range of motion.      Cervical back: Normal range of motion and neck supple.   Lymphadenopathy:      Cervical: No cervical adenopathy.   Skin:     General: Skin is warm and dry.   Neurological:      Mental Status: She is alert and oriented to person, place, and time. She is not disoriented.      Sensory: No sensory deficit.      Motor: No weakness.      Coordination: Coordination normal.      Gait: Gait normal.      Deep Tendon Reflexes: Reflexes are normal and symmetric.   Psychiatric:         Speech: Speech normal.         Behavior: Behavior normal.         Thought Content: Thought content normal.         Judgment: Judgment normal.

## 2024-09-27 NOTE — PROGRESS NOTES
Assessment/Plan:    Overweight  At goal wt --pt will cont Zep 10 for maint     Diagnoses and all orders for this visit:    Hypothyroidism due to Hashimoto's thyroiditis    Anxiety    Primary insomnia    Overweight    Class 3 obesity (HCC)  -     tirzepatide (Zepbound) 10 mg/0.5 mL auto-injector; Inject 0.5 mL (10 mg total) under the skin once a week          Subjective:   Chief Complaint   Patient presents with    Medication Management     zepbound    Skin Problem     Wants to check out some skin tags        Patient ID: Cindy Gilbert is a 54 y.o. female.    HPI    The following portions of the patient's history were reviewed and updated as appropriate: allergies, current medications, past family history, past medical history, past social history, past surgical history and problem list.    Review of Systems   Constitutional:  Negative for fatigue, fever and unexpected weight change.   HENT:  Negative for congestion, sinus pain and sore throat.    Eyes:  Negative for visual disturbance.   Respiratory:  Negative for shortness of breath and wheezing.    Cardiovascular:  Negative for chest pain and palpitations.   Gastrointestinal:  Negative for abdominal pain, nausea and vomiting.   Musculoskeletal: Negative.  Negative for arthralgias and myalgias.   Neurological:  Negative for syncope, weakness and numbness.   Psychiatric/Behavioral: Negative.  Negative for confusion, dysphoric mood and suicidal ideas.          Objective:  Vitals:    09/27/24 1305   BP: 94/72   BP Location: Left arm   Patient Position: Sitting   Cuff Size: Standard   Pulse: (!) 107   Temp: 98.1 °F (36.7 °C)   TempSrc: Tympanic   SpO2: 99%   Weight: 64 kg (141 lb)      Physical Exam  Constitutional:       Appearance: She is well-developed.   HENT:      Right Ear: Ear canal normal. Tympanic membrane is not injected.      Left Ear: Ear canal normal. Tympanic membrane is not injected.      Nose: Nose normal.   Eyes:      General:         Right eye: No  discharge.         Left eye: No discharge.      Conjunctiva/sclera: Conjunctivae normal.      Pupils: Pupils are equal, round, and reactive to light.   Neck:      Thyroid: No thyromegaly.   Cardiovascular:      Rate and Rhythm: Normal rate and regular rhythm.      Heart sounds: Normal heart sounds. No murmur heard.  Pulmonary:      Effort: Pulmonary effort is normal. No respiratory distress.      Breath sounds: Normal breath sounds. No wheezing.   Abdominal:      General: Bowel sounds are normal. There is no distension.      Palpations: Abdomen is soft.      Tenderness: There is no abdominal tenderness.   Musculoskeletal:         General: Normal range of motion.      Cervical back: Normal range of motion and neck supple.   Lymphadenopathy:      Cervical: No cervical adenopathy.   Skin:     General: Skin is warm and dry.   Neurological:      Mental Status: She is alert and oriented to person, place, and time. She is not disoriented.      Sensory: No sensory deficit.      Motor: No weakness.      Coordination: Coordination normal.      Gait: Gait normal.      Deep Tendon Reflexes: Reflexes are normal and symmetric.   Psychiatric:         Speech: Speech normal.         Behavior: Behavior normal.         Thought Content: Thought content normal.         Judgment: Judgment normal.

## 2024-09-27 NOTE — ASSESSMENT & PLAN NOTE
C/o ongoing pain R lateral lower leg---also on L  but R leg pain worse--intermittent superficial phlebitis

## 2024-10-03 DIAGNOSIS — E66.813 CLASS 3 OBESITY: ICD-10-CM

## 2024-10-04 DIAGNOSIS — E66.813 CLASS 3 OBESITY: ICD-10-CM

## 2024-10-04 RX ORDER — TIRZEPATIDE 10 MG/.5ML
10 INJECTION, SOLUTION SUBCUTANEOUS WEEKLY
Qty: 6 ML | Refills: 3 | Status: SHIPPED | OUTPATIENT
Start: 2024-10-04

## 2024-10-04 RX ORDER — TIRZEPATIDE 10 MG/.5ML
10 INJECTION, SOLUTION SUBCUTANEOUS WEEKLY
Qty: 6 ML | Refills: 0 | Status: SHIPPED | OUTPATIENT
Start: 2024-10-04

## 2024-10-15 ENCOUNTER — TELEPHONE (OUTPATIENT)
Dept: FAMILY MEDICINE CLINIC | Facility: CLINIC | Age: 54
End: 2024-10-15

## 2024-10-15 DIAGNOSIS — E66.813 CLASS 3 OBESITY: ICD-10-CM

## 2024-10-15 RX ORDER — TIRZEPATIDE 10 MG/.5ML
10 INJECTION, SOLUTION SUBCUTANEOUS WEEKLY
Qty: 6 ML | Refills: 3 | Status: SHIPPED | OUTPATIENT
Start: 2024-10-15

## 2024-10-15 NOTE — TELEPHONE ENCOUNTER
Pt would like Zepbound Pen 0.5ML 4'S 10 MG refilled and sent through [facility] [phone].

## 2024-10-17 ENCOUNTER — APPOINTMENT (OUTPATIENT)
Dept: URGENT CARE | Facility: CLINIC | Age: 54
End: 2024-10-17

## 2024-11-06 ENCOUNTER — OFFICE VISIT (OUTPATIENT)
Dept: FAMILY MEDICINE CLINIC | Facility: CLINIC | Age: 54
End: 2024-11-06
Payer: COMMERCIAL

## 2024-11-06 VITALS — BODY MASS INDEX: 24.34 KG/M2 | OXYGEN SATURATION: 99 % | WEIGHT: 144 LBS

## 2024-11-06 DIAGNOSIS — S03.00XA DISLOCATION OF TEMPOROMANDIBULAR JOINT, INITIAL ENCOUNTER: Primary | ICD-10-CM

## 2024-11-06 DIAGNOSIS — M54.50 CHRONIC BILATERAL LOW BACK PAIN WITHOUT SCIATICA: ICD-10-CM

## 2024-11-06 DIAGNOSIS — G89.29 CHRONIC BILATERAL LOW BACK PAIN WITHOUT SCIATICA: ICD-10-CM

## 2024-11-06 PROCEDURE — 99214 OFFICE O/P EST MOD 30 MIN: CPT | Performed by: FAMILY MEDICINE

## 2024-11-06 RX ORDER — METAXALONE 800 MG/1
800 TABLET ORAL 3 TIMES DAILY
Qty: 90 TABLET | Refills: 5 | Status: SHIPPED | OUTPATIENT
Start: 2024-11-06

## 2024-11-06 NOTE — PROGRESS NOTES
Assessment/Plan:    No problem-specific Assessment & Plan notes found for this encounter.       Diagnoses and all orders for this visit:    Dislocation of temporomandibular joint, initial encounter  Comments:  traumatic          Subjective:   No chief complaint on file.       Patient ID: Cindy Gilbert is a 54 y.o. female.    HPI    The following portions of the patient's history were reviewed and updated as appropriate: allergies, current medications, past family history, past medical history, past social history, past surgical history and problem list.    Review of Systems   Constitutional:  Negative for fatigue, fever and unexpected weight change.   HENT:  Negative for congestion, sinus pain and sore throat.    Eyes:  Negative for visual disturbance.   Respiratory:  Negative for shortness of breath and wheezing.    Cardiovascular:  Negative for chest pain and palpitations.   Gastrointestinal:  Negative for abdominal pain, nausea and vomiting.   Musculoskeletal: Negative.  Negative for arthralgias and myalgias.   Neurological:  Negative for syncope, weakness and numbness.   Psychiatric/Behavioral: Negative.  Negative for confusion, dysphoric mood and suicidal ideas.          Objective:  Vitals:    11/06/24 1139   SpO2: 99%   Weight: 65.3 kg (144 lb)      Physical Exam  Constitutional:       Appearance: She is well-developed.   HENT:      Right Ear: Ear canal normal. Tympanic membrane is not injected.      Left Ear: Ear canal normal. Tympanic membrane is not injected.      Nose: Nose normal.   Eyes:      General:         Right eye: No discharge.         Left eye: No discharge.      Conjunctiva/sclera: Conjunctivae normal.      Pupils: Pupils are equal, round, and reactive to light.   Neck:      Thyroid: No thyromegaly.   Cardiovascular:      Rate and Rhythm: Normal rate and regular rhythm.      Heart sounds: Normal heart sounds. No murmur heard.  Pulmonary:      Effort: Pulmonary effort is normal. No respiratory  distress.      Breath sounds: Normal breath sounds. No wheezing.   Abdominal:      General: Bowel sounds are normal. There is no distension.      Palpations: Abdomen is soft.      Tenderness: There is no abdominal tenderness.   Musculoskeletal:         General: Normal range of motion.      Cervical back: Normal range of motion and neck supple.   Lymphadenopathy:      Cervical: No cervical adenopathy.   Skin:     General: Skin is warm and dry.   Neurological:      Mental Status: She is alert and oriented to person, place, and time. She is not disoriented.      Sensory: No sensory deficit.      Motor: No weakness.      Coordination: Coordination normal.      Gait: Gait normal.      Deep Tendon Reflexes: Reflexes are normal and symmetric.   Psychiatric:         Speech: Speech normal.         Behavior: Behavior normal.         Thought Content: Thought content normal.         Judgment: Judgment normal.       TMJ well aligned/no crepitus/Tms intact

## 2025-01-06 DIAGNOSIS — R53.83 FATIGUE, UNSPECIFIED TYPE: Primary | ICD-10-CM

## 2025-01-06 DIAGNOSIS — E03.9 HYPOTHYROIDISM, UNSPECIFIED TYPE: ICD-10-CM

## 2025-01-07 RX ORDER — LEVOTHYROXINE SODIUM 75 UG/1
75 TABLET ORAL DAILY
Qty: 90 TABLET | Refills: 1 | Status: SHIPPED | OUTPATIENT
Start: 2025-01-07

## 2025-01-08 ENCOUNTER — TELEPHONE (OUTPATIENT)
Age: 55
End: 2025-01-08

## 2025-01-08 NOTE — ADDENDUM NOTE
Addended by: EARLENE REED on: 1/8/2025 10:20 AM     Modules accepted: Orders     October 5, 2022     Patient: Maria Eugenia Rosen   YOB: 2013   Date of Visit: 10/5/2022       To Whom it May Concern:    Maria Eugenia Rosen was seen in my clinic on 10/5/2022 at 5:00 pm.     Please excuse Maria Eugenia for her absence from school from 10/3-10/5 due to her illness. She was seen in my office and tested negative for COVID and influenza.     Sincerely,         Mary Hua MD    Medical information is confidential and cannot be disclosed without the written consent of the patient or her representative.

## 2025-01-08 NOTE — TELEPHONE ENCOUNTER
Pt called looking to get scheduled either later this afternoon or tomorrow morning; there has been sickness going around her work and she is feeling chest/throat tightness along with congestion and watery, running eyes and is a bit concerned due to her history of DVT and pulmonary embolism. Transferred to [facility] in office to review schedule.

## 2025-01-08 NOTE — TELEPHONE ENCOUNTER
Spoke with Dr Flores, patient can be placed on scheduled for 1/9/25 or go to Urgent Care.   Advised patient of Dr Flores message, patient asking if there was anyone else for today, unfortunately there is not.. patient reports that she has chest tightness along with cold symptoms and is worried due to hx of DVT and PE, I advised patient any chest discomfort or pain should be seen in ED, patient verbalized understanding and thanked me for the advice but due to financial reasons she needs to come to PCP.   Placed with Dolly 1/9/2025, patient aware of date and time.

## 2025-01-09 ENCOUNTER — OFFICE VISIT (OUTPATIENT)
Dept: FAMILY MEDICINE CLINIC | Facility: CLINIC | Age: 55
End: 2025-01-09
Payer: COMMERCIAL

## 2025-01-09 VITALS
TEMPERATURE: 98.2 F | WEIGHT: 137 LBS | HEART RATE: 100 BPM | BODY MASS INDEX: 23.15 KG/M2 | DIASTOLIC BLOOD PRESSURE: 60 MMHG | OXYGEN SATURATION: 95 % | SYSTOLIC BLOOD PRESSURE: 96 MMHG

## 2025-01-09 DIAGNOSIS — E78.2 MIXED HYPERLIPIDEMIA: ICD-10-CM

## 2025-01-09 DIAGNOSIS — J40 BRONCHITIS: Primary | ICD-10-CM

## 2025-01-09 PROCEDURE — 99213 OFFICE O/P EST LOW 20 MIN: CPT

## 2025-01-09 PROCEDURE — 87636 SARSCOV2 & INF A&B AMP PRB: CPT

## 2025-01-09 RX ORDER — BENZONATATE 200 MG/1
200 CAPSULE ORAL 3 TIMES DAILY PRN
Qty: 50 CAPSULE | Refills: 1 | Status: SHIPPED | OUTPATIENT
Start: 2025-01-09 | End: 2025-01-09

## 2025-01-09 RX ORDER — BENZONATATE 200 MG/1
200 CAPSULE ORAL 3 TIMES DAILY PRN
Qty: 50 CAPSULE | Refills: 1 | Status: SHIPPED | OUTPATIENT
Start: 2025-01-09

## 2025-01-09 RX ORDER — AZITHROMYCIN 250 MG/1
TABLET, FILM COATED ORAL
Qty: 6 TABLET | Refills: 0 | Status: SHIPPED | OUTPATIENT
Start: 2025-01-09 | End: 2025-01-13

## 2025-01-09 NOTE — PATIENT INSTRUCTIONS
Discussed viral vs bacterial etiology.  Take entire course of azithromycin if you start. Would wait and only start if symptoms are getting worse or remain after 10 days.  Tessalon perles as needed for cough.  Return if symptoms fail to improve or worsen.   Go to ER with worsening CP, SOB, dizziness, racing heart, leg pain/swelling.

## 2025-01-09 NOTE — PROGRESS NOTES
"Name: Cindy Gilbert      : 1970      MRN: [id number]  Encounter Provider: EMA Wright  Encounter Date: 2025   Encounter department: [facility]  :  Assessment & Plan  Bronchitis  Patient reports that she is mostly concerned due to the chest tightness/SOB due to hx of PE. States that she believes some of these symptoms may be related to anxiety. Due to associated symptoms, likely related to URI. Encouraged to return if symptoms fail to improve or worsen. Patient verbalized understanding.   Orders:    azithromycin (ZITHROMAX) 250 mg tablet; 2 tabs Day 1, then 1 tab daily X 4 days    benzonatate (TESSALON) 200 MG capsule; Take 1 capsule (200 mg total) by mouth 3 (three) times a day as needed for cough    Covid/Flu- Office Collect Normal    Mixed hyperlipidemia    Orders:    Lipid Panel with Direct LDL reflex; Future          Depression Screening and Follow-up Plan: Patient was screened for depression during today's encounter. They screened negative with a PHQ-2 score of 0.      History of Present Illness     URI   This is a new problem. The current episode started in the past 7 days. The problem has been gradually worsening. Associated symptoms include congestion, coughing, ear pain, headaches, a plugged ear sensation, rhinorrhea and a sore throat. Pertinent negatives include no abdominal pain, chest pain, diarrhea, dysuria, joint pain, joint swelling, nausea, neck pain, rash, sinus pain, sneezing, swollen glands, vomiting or wheezing. Treatments tried: cloves/honey, cough drops, advil. The treatment provided moderate relief.     Review of Systems   Constitutional:  Positive for fatigue. Negative for activity change, chills and fever.   HENT:  Positive for congestion, ear pain, rhinorrhea and sore throat. Negative for sinus pain and sneezing.    Eyes:  Positive for discharge (\"watery eyes\"). Negative for pain and visual disturbance.   Respiratory:  " Positive for cough and chest tightness. Negative for shortness of breath and wheezing.    Cardiovascular:  Negative for chest pain, palpitations and leg swelling.   Gastrointestinal:  Negative for abdominal pain, diarrhea, nausea and vomiting.   Genitourinary:  Negative for dysuria and hematuria.   Musculoskeletal:  Negative for arthralgias, back pain, joint pain, myalgias and neck pain.   Skin:  Negative for color change and rash.   Neurological:  Positive for headaches. Negative for dizziness, seizures, syncope, weakness and numbness.   All other systems reviewed and are negative.      Objective   BP 96/60 (BP Location: Left arm, Patient Position: Sitting, Cuff Size: Standard)   Pulse 100   Temp 98.2 °F (36.8 °C) (Tympanic)   Wt 62.1 kg (137 lb)   SpO2 95%   BMI 23.15 kg/m²      Physical Exam  Vitals and nursing note reviewed.   Constitutional:       General: She is not in acute distress.     Appearance: Normal appearance. She is well-developed. She is not ill-appearing.   HENT:      Head: Normocephalic and atraumatic.      Right Ear: Tympanic membrane, ear canal and external ear normal. There is no impacted cerumen.      Left Ear: Tympanic membrane, ear canal and external ear normal. There is no impacted cerumen.      Nose: Nose normal.      Mouth/Throat:      Mouth: Mucous membranes are moist.      Pharynx: Posterior oropharyngeal erythema present.   Cardiovascular:      Rate and Rhythm: Normal rate and regular rhythm.      Heart sounds: Normal heart sounds. No murmur heard.  Pulmonary:      Effort: Pulmonary effort is normal. No respiratory distress.      Breath sounds: Normal breath sounds and air entry. No decreased air movement. No wheezing, rhonchi or rales.   Abdominal:      General: Bowel sounds are normal. There is no distension.      Palpations: Abdomen is soft.      Tenderness: There is no abdominal tenderness.   Musculoskeletal:         General: No swelling.      Cervical back: Neck supple.    Lymphadenopathy:      Cervical: No cervical adenopathy.   Skin:     General: Skin is warm and dry.      Capillary Refill: Capillary refill takes less than 2 seconds.   Neurological:      Mental Status: She is alert and oriented to person, place, and time. Mental status is at baseline.   Psychiatric:         Mood and Affect: Mood normal.         Behavior: Behavior normal.       Administrative Statements   I have spent a total time of 30 minutes in caring for this patient on the day of the visit/encounter including Risks and benefits of tx options, Instructions for management, Patient and family education, Importance of tx compliance, Risk factor reductions, Impressions, Documenting in the medical record, Reviewing / ordering tests, medicine, procedures  , and Obtaining or reviewing history  .

## 2025-01-10 ENCOUNTER — TELEPHONE (OUTPATIENT)
Dept: FAMILY MEDICINE CLINIC | Facility: CLINIC | Age: 55
End: 2025-01-10

## 2025-01-10 LAB
FLUAV RNA RESP QL NAA+PROBE: NEGATIVE
FLUBV RNA RESP QL NAA+PROBE: NEGATIVE
SARS-COV-2 RNA RESP QL NAA+PROBE: POSITIVE

## 2025-01-16 ENCOUNTER — RESULTS FOLLOW-UP (OUTPATIENT)
Dept: FAMILY MEDICINE CLINIC | Facility: CLINIC | Age: 55
End: 2025-01-16

## 2025-01-16 DIAGNOSIS — D64.9 ANEMIA, UNSPECIFIED TYPE: ICD-10-CM

## 2025-01-16 DIAGNOSIS — I10 PRIMARY HYPERTENSION: Primary | ICD-10-CM

## 2025-01-16 DIAGNOSIS — E03.9 HYPOTHYROIDISM, UNSPECIFIED TYPE: ICD-10-CM

## 2025-01-16 LAB
ALBUMIN SERPL-MCNC: 3.8 G/DL (ref 3.6–5.1)
ALBUMIN/GLOB SERPL: 1.4 (CALC) (ref 1–2.5)
ALP SERPL-CCNC: 62 U/L (ref 37–153)
ALT SERPL-CCNC: 30 U/L (ref 6–29)
AST SERPL-CCNC: 22 U/L (ref 10–35)
BASOPHILS # BLD AUTO: 49 CELLS/UL (ref 0–200)
BASOPHILS NFR BLD AUTO: 1.3 %
BILIRUB SERPL-MCNC: 0.4 MG/DL (ref 0.2–1.2)
BUN SERPL-MCNC: 15 MG/DL (ref 7–25)
BUN/CREAT SERPL: ABNORMAL (CALC) (ref 6–22)
CALCIUM SERPL-MCNC: 8.6 MG/DL (ref 8.6–10.4)
CHLORIDE SERPL-SCNC: 105 MMOL/L (ref 98–110)
CHOLEST SERPL-MCNC: 156 MG/DL
CHOLEST/HDLC SERPL: 3.5 (CALC)
CO2 SERPL-SCNC: 28 MMOL/L (ref 20–32)
CREAT SERPL-MCNC: 0.57 MG/DL (ref 0.5–1.03)
EOSINOPHIL # BLD AUTO: 110 CELLS/UL (ref 15–500)
EOSINOPHIL NFR BLD AUTO: 2.9 %
ERYTHROCYTE [DISTWIDTH] IN BLOOD BY AUTOMATED COUNT: 15.6 % (ref 11–15)
GFR/BSA.PRED SERPLBLD CYS-BASED-ARV: 109 ML/MIN/1.73M2
GLOBULIN SER CALC-MCNC: 2.7 G/DL (CALC) (ref 1.9–3.7)
GLUCOSE SERPL-MCNC: 77 MG/DL (ref 65–99)
HCT VFR BLD AUTO: 33.8 % (ref 35–45)
HDLC SERPL-MCNC: 45 MG/DL
HGB BLD-MCNC: 10.1 G/DL (ref 11.7–15.5)
LDLC SERPL CALC-MCNC: 91 MG/DL (CALC)
LYMPHOCYTES # BLD AUTO: 965 CELLS/UL (ref 850–3900)
LYMPHOCYTES NFR BLD AUTO: 25.4 %
MCH RBC QN AUTO: 22.6 PG (ref 27–33)
MCHC RBC AUTO-ENTMCNC: 29.9 G/DL (ref 32–36)
MCV RBC AUTO: 75.8 FL (ref 80–100)
MONOCYTES # BLD AUTO: 422 CELLS/UL (ref 200–950)
MONOCYTES NFR BLD AUTO: 11.1 %
NEUTROPHILS # BLD AUTO: 2253 CELLS/UL (ref 1500–7800)
NEUTROPHILS NFR BLD AUTO: 59.3 %
NONHDLC SERPL-MCNC: 111 MG/DL (CALC)
PLATELET # BLD AUTO: 322 THOUSAND/UL (ref 140–400)
PMV BLD REES-ECKER: 10.5 FL (ref 7.5–12.5)
POTASSIUM SERPL-SCNC: 4.2 MMOL/L (ref 3.5–5.3)
PROT SERPL-MCNC: 6.5 G/DL (ref 6.1–8.1)
RBC # BLD AUTO: 4.46 MILLION/UL (ref 3.8–5.1)
SODIUM SERPL-SCNC: 138 MMOL/L (ref 135–146)
TRIGL SERPL-MCNC: 108 MG/DL
TSH SERPL-ACNC: 3.09 MIU/L
WBC # BLD AUTO: 3.8 THOUSAND/UL (ref 3.8–10.8)

## 2025-01-16 NOTE — TELEPHONE ENCOUNTER
----- Message from Dandre Flores MD sent at 1/16/2025  8:30 AM EST -----  Slight anemia---OV to disc

## 2025-01-16 NOTE — TELEPHONE ENCOUNTER
Please review patient's lab results and call her back . Gave her Dolly's message but she has questions about her thyroid.  Please recipes and call. Detailed message can be left on the phone as well or my chart message is appreciated. She wondering with changing thyroid number if her Levothyroxine needs to be adjusted she is getting ready for her 90 day refill.    Thank you!

## 2025-01-16 NOTE — TELEPHONE ENCOUNTER
Patient returned phone call, patient is asking about what will be accomplished if she comes in to discuss slight anemic, patient states its been low for a while.     Patient asking about her levothyroxine, if dosage needs to be changed, or if she is good at 75MCG?

## 2025-01-20 NOTE — TELEPHONE ENCOUNTER
Patient concerns on Thyroid medication , patient hard recent labs completed and asking if she should continue 75 MCG of levothyroxine?  Why she need to sched appointment for Sl anemic ?   Lipids questions stable ?? What does it mean by stable are they good ? Should she watch for something?  Appointment offered to answer all question que may have, refused.   Why no one has gotten back to her ??      Please see other task for other questions     SANGITA Michel    1/16/25  3:43 PM  Note      Patient returned phone call, patient is asking about what will be accomplished if she comes in to discuss slight anemic, patient states its been low for a while.      Patient asking about her levothyroxine, if dosage needs to be changed, or if she is good at 75MCG?             Patient will start work at 10 in the morning ok to leave detail message with respond, patient advise office will try to reach out on 01/21/2025 before 10 AM

## 2025-01-20 NOTE — TELEPHONE ENCOUNTER
Patient said she been calling since last week and no one has return her call about the question she need answered about her medication. Can someone please assist the patient and give her a call back. Thank you

## 2025-01-21 NOTE — TELEPHONE ENCOUNTER
Called patient with Dr Flores's message, LMOM for pt to CB and [facility] Sl anemia----take OTC iron and RP 2mos---thyroid ok----CBC/T4/TSH 2 mos

## 2025-01-21 NOTE — TELEPHONE ENCOUNTER
Patient called back, advised patient that message was sent to Dr Flores for review   Iron pills are rough/sick on stomach.   Patient reports her hair is still falling out, asking for advise on why and what she can do/check.    Weight loss management was checking A1c, patient requests for it to be checked-ok to order?    Patient requests for labs to be mailed.

## 2025-02-11 DIAGNOSIS — E66.813 CLASS 3 OBESITY: ICD-10-CM

## 2025-02-12 ENCOUNTER — TELEPHONE (OUTPATIENT)
Age: 55
End: 2025-02-12

## 2025-02-12 ENCOUNTER — PATIENT MESSAGE (OUTPATIENT)
Dept: FAMILY MEDICINE CLINIC | Facility: CLINIC | Age: 55
End: 2025-02-12

## 2025-02-12 DIAGNOSIS — E66.813 CLASS 3 OBESITY: ICD-10-CM

## 2025-02-12 DIAGNOSIS — E66.813 CLASS 3 OBESITY: Primary | ICD-10-CM

## 2025-02-12 RX ORDER — TIRZEPATIDE 12.5 MG/.5ML
12.5 INJECTION, SOLUTION SUBCUTANEOUS WEEKLY
Qty: 6 ML | Refills: 3 | Status: SHIPPED | OUTPATIENT
Start: 2025-02-12

## 2025-02-12 RX ORDER — TIRZEPATIDE 12.5 MG/.5ML
12.5 INJECTION, SOLUTION SUBCUTANEOUS WEEKLY
Qty: 2 ML | Refills: 0 | Status: SHIPPED | OUTPATIENT
Start: 2025-02-12 | End: 2025-02-12 | Stop reason: SDUPTHER

## 2025-02-12 RX ORDER — TIRZEPATIDE 10 MG/.5ML
10 INJECTION, SOLUTION SUBCUTANEOUS WEEKLY
Qty: 6 ML | Refills: 0 | Status: SHIPPED | OUTPATIENT
Start: 2025-02-12

## 2025-02-12 NOTE — TELEPHONE ENCOUNTER
Pt called in to correct and refill.  Pt asked for 12.5 mg of tirzepatide (Zepbound) 12.5 mg/0.5 mL auto-injector.    Pt was advised that the new order has been placed.

## 2025-02-12 NOTE — PATIENT COMMUNICATION
Spoke to patient and [facility] original 28 day script received in error for the 12.5 mg dose of zepbound has been cancelled, they have received the correct 12.5 mg 6 ml qty script for the 90 day supply and are processing the script as we speak. Patient is aware.

## 2025-02-24 ENCOUNTER — OFFICE VISIT (OUTPATIENT)
Dept: FAMILY MEDICINE CLINIC | Facility: CLINIC | Age: 55
End: 2025-02-24
Payer: COMMERCIAL

## 2025-02-24 ENCOUNTER — HOSPITAL ENCOUNTER (OUTPATIENT)
Dept: NON INVASIVE DIAGNOSTICS | Age: 55
Discharge: HOME | End: 2025-02-24
Payer: COMMERCIAL

## 2025-02-24 ENCOUNTER — TELEPHONE (OUTPATIENT)
Age: 55
End: 2025-02-24

## 2025-02-24 VITALS
SYSTOLIC BLOOD PRESSURE: 116 MMHG | OXYGEN SATURATION: 100 % | HEART RATE: 88 BPM | HEIGHT: 65 IN | TEMPERATURE: 97.1 F | DIASTOLIC BLOOD PRESSURE: 68 MMHG | WEIGHT: 136 LBS | BODY MASS INDEX: 22.66 KG/M2

## 2025-02-24 DIAGNOSIS — R06.02 SOB (SHORTNESS OF BREATH): ICD-10-CM

## 2025-02-24 DIAGNOSIS — I83.893 VARICOSE VEINS OF LEG WITH SWELLING, BILATERAL: ICD-10-CM

## 2025-02-24 DIAGNOSIS — I83.893 VARICOSE VEINS OF LEG WITH SWELLING, BILATERAL: Primary | ICD-10-CM

## 2025-02-24 PROCEDURE — 99214 OFFICE O/P EST MOD 30 MIN: CPT | Performed by: FAMILY MEDICINE

## 2025-02-24 PROCEDURE — 93970 EXTREMITY STUDY: CPT

## 2025-02-24 PROCEDURE — 93970 EXTREMITY STUDY: CPT | Performed by: SURGERY

## 2025-02-24 NOTE — PROGRESS NOTES
"Name: Cindy Gilbert      : 1970      MRN: [id number]  Encounter Provider: Dandre Flores MD  Encounter Date: 2025   Encounter department: [facility]  :  Assessment & Plan  Varicose veins of leg with swelling, bilateral    Orders:    VAS VENOUS DUPLEX -LOWER LIMB UNILATERAL; Future    D-dimer, quantitative; Future    SOB (shortness of breath)  Hx PE ----D dimer ordered              History of Present Illness   HPI  Review of Systems   Constitutional:  Negative for fatigue, fever and unexpected weight change.   HENT:  Negative for congestion, sinus pain and sore throat.    Eyes:  Negative for visual disturbance.   Respiratory:  Negative for shortness of breath and wheezing.    Cardiovascular:  Negative for chest pain and palpitations.   Gastrointestinal:  Negative for abdominal pain, nausea and vomiting.   Musculoskeletal: Negative.  Negative for arthralgias and myalgias.   Neurological:  Negative for syncope, weakness and numbness.   Psychiatric/Behavioral: Negative.  Negative for confusion, dysphoric mood and suicidal ideas.        Objective   /68 (BP Location: Left arm, Patient Position: Sitting, Cuff Size: Standard)   Pulse 88   Temp (!) 97.1 °F (36.2 °C) (Tympanic)   Ht 5' 4.5\" (1.638 m)   Wt 61.7 kg (136 lb)   SpO2 100%   BMI 22.98 kg/m²      Physical Exam  Vitals and nursing note reviewed.   Constitutional:       General: She is not in acute distress.     Appearance: She is well-developed.   HENT:      Head: Normocephalic and atraumatic.   Eyes:      Conjunctiva/sclera: Conjunctivae normal.   Cardiovascular:      Rate and Rhythm: Normal rate and regular rhythm.      Heart sounds: No murmur heard.  Pulmonary:      Effort: Pulmonary effort is normal. No respiratory distress.      Breath sounds: Normal breath sounds.   Abdominal:      Palpations: Abdomen is soft.      Tenderness: There is no abdominal tenderness.   Musculoskeletal:         General: " No swelling.      Cervical back: Neck supple.   Skin:     General: Skin is warm and dry.      Capillary Refill: Capillary refill takes less than 2 seconds.   Neurological:      Mental Status: She is alert.   Psychiatric:         Mood and Affect: Mood normal.

## 2025-02-24 NOTE — TELEPHONE ENCOUNTER
Pt was seen this morning , needs a note for work to be able to return on Wednesday 02/26/25. Please advise and contact pt once note is ready for

## 2025-02-24 NOTE — ASSESSMENT & PLAN NOTE
Hx PE 2022----D dimer ordered        ER Physician:  Annmarie Director  CHEST XRAY INTERPRETATION: lungs clear, heart shadow normal, bony structures intact

## 2025-02-24 NOTE — LETTER
February 24, 2025     Patient: Cindy Gilbert  YOB: 1970  Date of Visit: 2/24/2025      To Whom it May Concern:    Cindy Gilbert is under my professional care. Cindy was seen in my office on 2/24/2025. Cindy may return to work on 2/26/2025 .    If you have any questions or concerns, please don't hesitate to call.         Sincerely,        Dandre Flores MD

## 2025-02-25 ENCOUNTER — APPOINTMENT (OUTPATIENT)
Dept: LAB | Facility: HOSPITAL | Age: 55
End: 2025-02-25
Payer: COMMERCIAL

## 2025-02-25 DIAGNOSIS — I83.893 VARICOSE VEINS OF LEG WITH SWELLING, BILATERAL: ICD-10-CM

## 2025-02-25 LAB — D DIMER PPP FEU-MCNC: <0.27 UG/ML FEU

## 2025-02-25 PROCEDURE — 85379 FIBRIN DEGRADATION QUANT: CPT

## 2025-02-25 PROCEDURE — 36415 COLL VENOUS BLD VENIPUNCTURE: CPT

## 2025-02-26 ENCOUNTER — RESULTS FOLLOW-UP (OUTPATIENT)
Dept: FAMILY MEDICINE CLINIC | Facility: CLINIC | Age: 55
End: 2025-02-26

## 2025-03-03 ENCOUNTER — TELEPHONE (OUTPATIENT)
Age: 55
End: 2025-03-03

## 2025-03-06 ENCOUNTER — OFFICE VISIT (OUTPATIENT)
Dept: VASCULAR SURGERY | Facility: CLINIC | Age: 55
End: 2025-03-06
Payer: COMMERCIAL

## 2025-03-06 VITALS
OXYGEN SATURATION: 98 % | WEIGHT: 135 LBS | TEMPERATURE: 98.1 F | BODY MASS INDEX: 22.49 KG/M2 | SYSTOLIC BLOOD PRESSURE: 100 MMHG | DIASTOLIC BLOOD PRESSURE: 70 MMHG | HEART RATE: 71 BPM | HEIGHT: 65 IN

## 2025-03-06 DIAGNOSIS — I87.2 VENOUS INSUFFICIENCY OF BOTH LOWER EXTREMITIES: Primary | ICD-10-CM

## 2025-03-06 PROCEDURE — 99214 OFFICE O/P EST MOD 30 MIN: CPT | Performed by: NURSE PRACTITIONER

## 2025-03-06 NOTE — PROGRESS NOTES
Name: Cindy Gilbert      : 1970      MRN: [id number]  Encounter Provider: EMA Marrero  Encounter Date: 3/6/2025   [facility] department[facility]    53 yo F w/ hx of DVT/PE '22, migraine, hypothyroidism, anxiety, HLD, venous insufficiency returns to the office with varicose veins concerns  Assessment & Plan  Venous insufficiency of both lower extremities  Pt returns to the office with varicose veins and pain in the b/l lower extremities    -Bulging truncal varicosities of the lower legs. Reports extensive b/l leg pain (heaviness, cramping and pulling sensation) in the calves is effecting her everyday life. She states she is in pain everyday and it is getting worse.  -Mostly sitting for majority of her day for her job, continues to have pain while sitting.  -Reports use of compression she has 2 strength's 15-20 and 20-30 and is unsure which one she is wearing. She has been wearing them on and off for one year. Reports the higher the compression the more intolerable they are to wear. She does not feel these help with her symptoms. She reports b/l leg pain symptoms occur with exercise as well. Reports that nothing really alleviates her pain symptoms.   -No hx of vein surgeries. Prior hx of DVT/ PE.   -Reviewed pathophysiology of venous insuffiencey and varicose veins and treatment with conservative measures at this time with leg compression, elevation and exercise. We discussed at length that her b/l leg pain symptoms are likely due to multifactor issues and not solely from vascular etiology. She should return to her PCP for further work up and evaluation, also provided with referral to rheumatology for further work for the cause of her pain symptoms. Pt verbalized understanding and is agreeable to this plan.     Recommendations   -Return to the office as needed  -Continue wearing compression. RX given today with education on how to use.  -Leg elevation. Goal of 3-4 times a  day 15 minutes at a time.  -Increase exercise and ambulation.Goal of 3-4 times a week for 30 min.  -Apply moisturizing cream to the b/l legs to maintain skin integrity and prevent breakdown.  -Referral provided to Rheumatology  -Call the office with any new or worsening symptoms.     Orders:    Ambulatory Referral to Rheumatology; Future    Compression Stocking        History of Present Illness   HPI  Cindy Gilbert is a 54 y.o. female who presents DVT/PE '22, migraine, hypothyroidism, anxiety, HLD, venous insufficiency returns to the office with varicose veins concerns.  Presents to the office with her . Pt reports she has been utilizing compression on her legs and does not feel that this is helping with her b/l leg pain symptoms. She reports she continues to exercise daily on her treadmill and stationary bicycle and had excruciating pain while doing her exercise and while legs are at rest in seated position. We discussed that varicose vein pain is typically alleviated with elevation and compression and improves with walking. Discussed the probability that her pain etiology is multifactorial and that she should have other etiology ruled out, vein surgery is not without risk and she is at increased risk for developing DVT and further vein intervention may not cure her symptoms. Recommended having further workup with PCP or possibly rheumatology. Pt is agreeable to this plan. All questions answered.     She does her exercise daily on her treadmill/ bike.    Patient is here to RR DAR 02/24/25. PT is having severe pain in legs. Patient stated she does wear compression and does  elevation. Patient complains of of veins getting bigger on both legs. Patient stated bilateral leg pain in the back of legs, thighs and calf.  Patient stated she has claudication,    History obtained from: patient and patient's POA    Review of Systems  Current Outpatient Medications on File Prior to Visit   Medication Sig Dispense  "Refill    ALPRAZolam (XANAX) 0.5 mg tablet Take 1 tablet (0.5 mg total) by mouth 4 (four) times a day as needed for anxiety 120 tablet 5    aspirin (ECOTRIN LOW STRENGTH) 81 mg EC tablet Take 1 tablet (81 mg total) by mouth in the morning      benzonatate (TESSALON) 200 MG capsule Take 1 capsule (200 mg total) by mouth 3 (three) times a day as needed for cough 50 capsule 1    clobetasol (TEMOVATE) 0.05 % ointment if needed      dicyclomine (BENTYL) 20 mg tablet take 1 tablet by mouth every 6 hours 60 tablet 2    levothyroxine (Euthyrox) 75 mcg tablet Take 1 tablet (75 mcg total) by mouth daily 90 tablet 1    metaxalone (SKELAXIN) 800 mg tablet Take 1 tablet (800 mg total) by mouth 3 (three) times a day prn 90 tablet 5    omeprazole (PriLOSEC) 20 mg delayed release capsule take 1 capsule by mouth once daily 30 capsule 5    tirzepatide (Zepbound) 10 mg/0.5 mL auto-injector Inject 0.5 mL (10 mg total) under the skin once a week 6 mL 0    tirzepatide (Zepbound) 10 mg/0.5 mL auto-injector Inject 0.5 mL (10 mg total) under the skin once a week 6 mL 0    tirzepatide (Zepbound) 12.5 mg/0.5 mL auto-injector Inject 0.5 mL (12.5 mg total) under the skin once a week 6 mL 3    [DISCONTINUED] famotidine (PEPCID) 20 mg tablet Take 1 tablet (20 mg total) by mouth 2 (two) times a day 30 tablet 5    [DISCONTINUED] QUEtiapine (SEROquel) 25 mg tablet Take 1 tablet (25 mg total) by mouth daily at bedtime 90 tablet 1     No current facility-administered medications on file prior to visit.         Objective   /70 (BP Location: Right arm, Patient Position: Sitting, Cuff Size: Adult)   Pulse 71   Temp 98.1 °F (36.7 °C) (Temporal)   Ht 5' 4.5\" (1.638 m)   Wt 61.2 kg (135 lb)   SpO2 98%   BMI 22.81 kg/m²      Physical Exam  Vitals reviewed.   Constitutional:       General: She is not in acute distress.     Appearance: Normal appearance. She is not ill-appearing.   HENT:      Head: Normocephalic and atraumatic.   Cardiovascular:    "   Rate and Rhythm: Normal rate.      Pulses: Normal pulses.           Dorsalis pedis pulses are 2+ on the right side and 2+ on the left side.        Posterior tibial pulses are 2+ on the right side and 2+ on the left side.      Heart sounds: Normal heart sounds. No murmur heard.  Pulmonary:      Effort: Pulmonary effort is normal. No respiratory distress.      Breath sounds: Normal breath sounds.   Musculoskeletal:      Right lower leg: No edema.      Left lower leg: No edema.   Skin:     General: Skin is warm and dry.      Capillary Refill: Capillary refill takes less than 2 seconds.      Findings: No erythema or rash.      Comments: B/l thigh varicosities   Neurological:      General: No focal deficit present.      Mental Status: She is alert and oriented to person, place, and time.      Sensory: No sensory deficit.   Psychiatric:         Mood and Affect: Mood normal.         Behavior: Behavior normal.         Administrative Statements   I have spent a total time of 30 minutes in caring for this patient on the day of the visit/encounter including Diagnostic results, Risks and benefits of tx options, Instructions for management, Patient and family education, Importance of tx compliance, Counseling / Coordination of care, Documenting in the medical record, and Obtaining or reviewing history  .

## 2025-03-06 NOTE — ASSESSMENT & PLAN NOTE
Pt returns to the office with varicose veins and pain in the b/l lower extremities    -Bulging truncal varicosities of the lower legs. Reports extensive b/l leg pain (heaviness, cramping and pulling sensation) in the calves is effecting her everyday life. She states she is in pain everyday and it is getting worse.  -Mostly sitting for majority of her day for her job, continues to have pain while sitting.  -Reports use of compression she has 2 strength's 15-20 and 20-30 and is unsure which one she is wearing. She has been wearing them on and off for one year. Reports the higher the compression the more intolerable they are to wear. She does not feel these help with her symptoms. She reports b/l leg pain symptoms occur with exercise as well. Reports that nothing really alleviates her pain symptoms.   -No hx of vein surgeries. Prior hx of DVT/ PE.   -Reviewed pathophysiology of venous insuffiencey and varicose veins and treatment with conservative measures at this time with leg compression, elevation and exercise. We discussed at length that her b/l leg pain symptoms are likely due to multifactor issues and not solely from vascular etiology. She should return to her PCP for further work up and evaluation, also provided with referral to rheumatology for further work for the cause of her pain symptoms. Pt verbalized understanding and is agreeable to this plan.     Recommendations   -Return to the office as needed  -Continue wearing compression. RX given today with education on how to use.  -Leg elevation. Goal of 3-4 times a day 15 minutes at a time.  -Increase exercise and ambulation.Goal of 3-4 times a week for 30 min.  -Apply moisturizing cream to the b/l legs to maintain skin integrity and prevent breakdown.  -Referral provided to Rheumatology  -Call the office with any new or worsening symptoms.     Orders:    Ambulatory Referral to Rheumatology; Future    Compression Stocking

## 2025-03-16 LAB
BASOPHILS # BLD AUTO: 68 CELLS/UL (ref 0–200)
BASOPHILS NFR BLD AUTO: 1.8 %
EOSINOPHIL # BLD AUTO: 110 CELLS/UL (ref 15–500)
EOSINOPHIL NFR BLD AUTO: 2.9 %
ERYTHROCYTE [DISTWIDTH] IN BLOOD BY AUTOMATED COUNT: 20.7 % (ref 11–15)
HCT VFR BLD AUTO: 42.7 % (ref 35–45)
HGB BLD-MCNC: 13.2 G/DL (ref 11.7–15.5)
LYMPHOCYTES # BLD AUTO: 1224 CELLS/UL (ref 850–3900)
LYMPHOCYTES NFR BLD AUTO: 32.2 %
MCH RBC QN AUTO: 26.3 PG (ref 27–33)
MCHC RBC AUTO-ENTMCNC: 30.9 G/DL (ref 32–36)
MCV RBC AUTO: 85.2 FL (ref 80–100)
MONOCYTES # BLD AUTO: 361 CELLS/UL (ref 200–950)
MONOCYTES NFR BLD AUTO: 9.5 %
NEUTROPHILS # BLD AUTO: 2037 CELLS/UL (ref 1500–7800)
NEUTROPHILS NFR BLD AUTO: 53.6 %
PLATELET # BLD AUTO: 254 THOUSAND/UL (ref 140–400)
PMV BLD REES-ECKER: 10.9 FL (ref 7.5–12.5)
RBC # BLD AUTO: 5.01 MILLION/UL (ref 3.8–5.1)
T4 SERPL-MCNC: 8.8 MCG/DL (ref 5.1–11.9)
TSH SERPL-ACNC: 2.4 MIU/L
WBC # BLD AUTO: 3.8 THOUSAND/UL (ref 3.8–10.8)

## 2025-03-27 ENCOUNTER — OFFICE VISIT (OUTPATIENT)
Dept: FAMILY MEDICINE CLINIC | Facility: CLINIC | Age: 55
End: 2025-03-27
Payer: COMMERCIAL

## 2025-03-27 VITALS
SYSTOLIC BLOOD PRESSURE: 100 MMHG | HEIGHT: 65 IN | RESPIRATION RATE: 18 BRPM | HEART RATE: 79 BPM | OXYGEN SATURATION: 100 % | WEIGHT: 137 LBS | DIASTOLIC BLOOD PRESSURE: 64 MMHG | BODY MASS INDEX: 22.82 KG/M2

## 2025-03-27 DIAGNOSIS — M25.50 ARTHRALGIA, UNSPECIFIED JOINT: Primary | ICD-10-CM

## 2025-03-27 DIAGNOSIS — F41.9 ANXIETY: ICD-10-CM

## 2025-03-27 DIAGNOSIS — E53.8 B12 DEFICIENCY: ICD-10-CM

## 2025-03-27 PROCEDURE — 99214 OFFICE O/P EST MOD 30 MIN: CPT | Performed by: FAMILY MEDICINE

## 2025-03-27 RX ORDER — ALPRAZOLAM 0.5 MG
0.5 TABLET ORAL 4 TIMES DAILY PRN
Qty: 120 TABLET | Refills: 5 | Status: SHIPPED | OUTPATIENT
Start: 2025-03-27

## 2025-03-27 NOTE — PROGRESS NOTES
"Name: Cindy Gilbert      : 1970      MRN: [id number]  Encounter Provider: Dandre Flores MD  Encounter Date: 3/27/2025   Encounter department: [facility]  :  Assessment & Plan  Arthralgia, unspecified joint    Orders:  •  C-reactive protein; Future  •  Sedimentation rate, automated; Future  •  LUIS ANTONIO Screen w/Reflex Cascade; Future  •  LUIS ANTONIO by IFA Reflex for Rheumatologist; Future  •  LUIS ANTONIO by IFA (No Reflex)for Rheumatologist; Future  •  Sjogren's Antibodies; Future  •  RHEUMATOID FACTOR; Future  •  Lyme Total AB W Reflex to IGM/IGG; Future    B12 deficiency    Orders:  •  Vitamin B12; Future    Anxiety    Orders:  •  ALPRAZolam (XANAX) 0.5 mg tablet; Take 1 tablet (0.5 mg total) by mouth 4 (four) times a day as needed for anxiety           History of Present Illness   HPI  Review of Systems   Constitutional:  Negative for fatigue, fever and unexpected weight change.   HENT:  Negative for congestion, sinus pain and sore throat.    Eyes:  Negative for visual disturbance.   Respiratory:  Negative for shortness of breath and wheezing.    Cardiovascular:  Negative for chest pain and palpitations.   Gastrointestinal:  Negative for abdominal pain, nausea and vomiting.   Musculoskeletal: Negative.  Negative for arthralgias and myalgias.   Neurological:  Negative for syncope, weakness and numbness.   Psychiatric/Behavioral: Negative.  Negative for confusion, dysphoric mood and suicidal ideas.        Objective   /64   Pulse 79   Resp 18   Ht 5' 4.5\" (1.638 m)   Wt 62.1 kg (137 lb)   SpO2 100%   BMI 23.15 kg/m²      Physical Exam  Vitals and nursing note reviewed.   Constitutional:       General: She is not in acute distress.     Appearance: She is well-developed.   HENT:      Head: Normocephalic and atraumatic.   Eyes:      Conjunctiva/sclera: Conjunctivae normal.   Cardiovascular:      Rate and Rhythm: Normal rate and regular rhythm.      Heart sounds: No murmur " heard.  Pulmonary:      Effort: Pulmonary effort is normal. No respiratory distress.      Breath sounds: Normal breath sounds.   Abdominal:      Palpations: Abdomen is soft.      Tenderness: There is no abdominal tenderness.   Musculoskeletal:         General: No swelling.      Cervical back: Neck supple.   Skin:     General: Skin is warm and dry.      Capillary Refill: Capillary refill takes less than 2 seconds.   Neurological:      Mental Status: She is alert.   Psychiatric:         Mood and Affect: Mood normal.

## 2025-03-27 NOTE — ASSESSMENT & PLAN NOTE
Orders:    ALPRAZolam (XANAX) 0.5 mg tablet; Take 1 tablet (0.5 mg total) by mouth 4 (four) times a day as needed for anxiety

## 2025-03-28 NOTE — PROGRESS NOTES
Name: Cindy Gilbert      : 1970      MRN: [id number]  Encounter Provider: Юлия Alejo DO  Encounter Date: 3/31/2025   Encounter department: [facility] RHEUMATOLOGY [facility]  :  Assessment & Plan  Venous insufficiency of both lower extremities  Patient is a 54-year-old female presenting for further evaluation of chronic pain in her lower extremities, worse in the right calf.  Patient reports that the pain is constant and sometimes exacerbated with activity.  Patient reports that her calf sometimes feels swollen.  DVT was ruled out.  Otherwise, patient reports pain in her shoulders which comes and goes, however sometimes constant.  No prolonged morning stiffness.  Also reports tenderness to palpation of the back with chronic pain.  No obvious joint swelling.  No signs or symptoms of a connective tissue disease.  Workup shows negative LUIS ANTONIO, RF and normal inflammatory markers.  Overall, no objective evidence for a systemic autoimmune process.  Patient has full muscle strength on exam, low suspicion for myositis.  Discussed with patient that there could be a component of amplified pain syndrome.  -Discussed trying physical therapy or aqua therapy to help with amplified pain  -Can also consider discussing medication such as duloxetine with PCP if not contraindicated and feasible  -Continue to follow with PCP and vascular  Orders:    Ambulatory Referral to Rheumatology    Chronic midline back pain, unspecified back location  Patient reports chronic pain in her back located in the thoracic spine down to the lumbar spine.  Previous x-rays from 2012 report some degenerative changes.  Patient recently also experiencing pain shooting down her neck.  Will obtain x-rays to evaluate for any degenerative diseases.  Based on results can consider referral to comprehensive spine.  Orders:    XR spine cervical 2 or 3 vw injury; Future    XR spine thoracic 2 vw; Future    XR spine lumbar minimum 4 views non injury;  Future    RTC as needed    Pertinent Medical History   Reviewed        History of Present Illness   Cindy Gilbert is a 54 y.o. female with a history of hypothyroidism, migraines, venous insufficiency, PE and hyperlipidemia who presents for further evaluation of bilateral leg pain.      HPI   Patient reports that for quite some time now she has been experiencing pain in her bilateral lower extremities, right is worse than left.  Reports that she has varicose veins in her legs and has been following with vascular surgery.  No surgical interventions is recommended at this time.  Patient states that the most painful location is her calves.  The pain is constant, however has certain days that she is without any pain.  Sometimes the pain is exacerbated with ambulation.  Patient states that sometimes her calves are very itchy but there is no rash.  Patient states that she had a duplex done to rule out DVT and it was negative.    Patient states she has a history of a DVT and PE in 2023.  Patient states she had a workup for hypercoagulability which was all negative.  Patient states she was traveling prior to this occurring and that may have been the etiology.    Patient reports occasional stiffness in the shoulders.  When it occurs it is present all the time.  It hurts to sleep.  Pain does not get better with use.    Patient reports that the past few days she has been experiencing pain behind the right ear.  Patient was seen by her dentist and ENT with unremarkable workup.  Patient denies pain with chewing.  Patient states it feels like a burning sensation.  The pain also shoots down her neck.    Patient also reports chronic pain starting in her thoracic region down to her lumbar spine.  The pain is worse with walking.  Patient states that sometimes her back is tender to touch.  Reports she has trouble tolerating massages.  Has tried Voltaren gel and Advil which does provide some relief.  Patient states that heat really  helps with her pain.    Patient endorses brain fog and chronic fatigue.      Review of Systems  Complete ROS conducted as per HPI. In addition, denies:  Fever  Photosensitive rash  Sicca symptoms  Recurrent oral ulcers  Muscle weakness  Uveitis  Dactylitis  Chest pain  SOB  Pleurisy  Gross hematuria  Foamy urine  Raynaud's  Joint issues other than noted above    Current Outpatient Medications on File Prior to Visit   Medication Sig Dispense Refill    azithromycin (ZITHROMAX) 250 mg tablet TAKE 2 TABLETS BY MOUTH ON DAY 1, AND THEN TAKE 1 TABLET BY MOUTH ONCE A DAY ON DAY 2 THROUGH DAY 5      Sodium Fluoride 5000 Sensitive 1.1-5 % GEL BRUSH ONCE BEFORE BEDTIME IN PLACE OF REGULAR TOOTHPASTE, SPIT AFTER USE, DO NOT RINSE WITH WATER, EAT OR DRINK FOR 30 MINUTES AFTER USE      ALPRAZolam (XANAX) 0.5 mg tablet Take 1 tablet (0.5 mg total) by mouth 4 (four) times a day as needed for anxiety 120 tablet 5    aspirin (ECOTRIN LOW STRENGTH) 81 mg EC tablet Take 1 tablet (81 mg total) by mouth in the morning      benzonatate (TESSALON) 200 MG capsule Take 1 capsule (200 mg total) by mouth 3 (three) times a day as needed for cough 50 capsule 1    clobetasol (TEMOVATE) 0.05 % ointment if needed      dicyclomine (BENTYL) 20 mg tablet take 1 tablet by mouth every 6 hours 60 tablet 2    levothyroxine (Euthyrox) 75 mcg tablet Take 1 tablet (75 mcg total) by mouth daily 90 tablet 1    metaxalone (SKELAXIN) 800 mg tablet Take 1 tablet (800 mg total) by mouth 3 (three) times a day prn 90 tablet 5    omeprazole (PriLOSEC) 20 mg delayed release capsule take 1 capsule by mouth once daily 30 capsule 5    tirzepatide (Zepbound) 10 mg/0.5 mL auto-injector Inject 0.5 mL (10 mg total) under the skin once a week 6 mL 0    tirzepatide (Zepbound) 10 mg/0.5 mL auto-injector Inject 0.5 mL (10 mg total) under the skin once a week 6 mL 0    tirzepatide (Zepbound) 12.5 mg/0.5 mL auto-injector Inject 0.5 mL (12.5 mg total) under the skin once a week 6  "mL 3    [DISCONTINUED] famotidine (PEPCID) 20 mg tablet Take 1 tablet (20 mg total) by mouth 2 (two) times a day 30 tablet 5    [DISCONTINUED] QUEtiapine (SEROquel) 25 mg tablet Take 1 tablet (25 mg total) by mouth daily at bedtime 90 tablet 1     No current facility-administered medications on file prior to visit.      Social History     Tobacco Use    Smoking status: Former     Current packs/day: 0.00     Average packs/day: 0.3 packs/day for 30.0 years (7.5 ttl pk-yrs)     Types: Cigarettes     Start date:      Quit date: 2019     Years since quittin.2    Smokeless tobacco: Never    Tobacco comments:     havent smoked in a long time   Vaping Use    Vaping status: Former    Substances: Flavoring   Substance and Sexual Activity    Alcohol use: Not Currently     Comment: hasn't drank in ab 2 years    Drug use: Never    Sexual activity: Yes     Partners: Male     Birth control/protection: Surgical     Comment: Tubal ligation         Objective   /72   Pulse 64   Ht 5' 4.5\" (1.638 m)   Wt 61.7 kg (136 lb)   SpO2 98%   BMI 22.98 kg/m²     Physical Exam  General appearance: normal appearing, no acute distress  Skin: normal, no rashes  HEENT: normal, moist oropharynx, no nasal or oral ulcers  Lymph nodes: no palpable adenopathy  Lungs: normal respiratory effort, comfortable on room air, lungs clear to auscultation b/l   Heart: normal heart sounds, normal rate, normal rhythm,  Abdomen: soft, normal bowel sounds, no tenderness  Neurologic: no obvious neurological deficits   Extremities: no edema, warm and well perfused     Musculoskeletal Exam:   - Observation: no obvious joint abnormalities    - Palpation: no joint tenderness  - Synovitis: absent  - Joint effusions: absent  - ROM: intact throughout  - Muscle Strength: 5/5 throughout     Recent labs:  Lab Results   Component Value Date/Time    SODIUM 138 01/15/2025 07:15 AM    K 4.2 01/15/2025 07:15 AM    BUN 15 01/15/2025 07:15 AM    CREATININE 0.57 " 01/15/2025 07:15 AM    CREATININE 0.67 06/05/2023 11:46 PM    GLUC 77 01/15/2025 07:15 AM    CALCIUM 8.6 01/15/2025 07:15 AM    AST 22 01/15/2025 07:15 AM    ALT 30 (H) 01/15/2025 07:15 AM    ALB 3.8 01/15/2025 07:15 AM    ALB 3.7 06/05/2023 11:46 PM    TP 6.5 01/15/2025 07:15 AM    EGFR 109 01/15/2025 07:15 AM    EGFR 102 06/05/2023 11:46 PM     Lab Results   Component Value Date/Time    HGB 13.2 03/15/2025 07:36 AM    HGB 10.7 (L) 06/05/2023 11:46 PM    WBC 3.8 03/15/2025 07:36 AM    WBC 8.36 06/05/2023 11:46 PM     03/15/2025 07:36 AM     06/05/2023 11:46 PM       LUIS ANTONIO negative  RF negative  SSA negative  SSB negative  ESR normal  CRP normal    I have personally reviewed notes, labs, and imaging available in the chart.     Юлия Alejo DO, CCD, [facility]

## 2025-03-29 ENCOUNTER — RESULTS FOLLOW-UP (OUTPATIENT)
Dept: FAMILY MEDICINE CLINIC | Facility: CLINIC | Age: 55
End: 2025-03-29

## 2025-03-29 LAB
ANA SER QL IF: NEGATIVE
ANA SER QL: NEGATIVE
B BURGDOR AB SER IA-ACNC: <0.9 INDEX
CRP SERPL-MCNC: <3 MG/L
ENA SS-A AB SER IA-ACNC: NORMAL AI
ENA SS-B AB SER IA-ACNC: NORMAL AI
ERYTHROCYTE [SEDIMENTATION RATE] IN BLOOD BY WESTERGREN METHOD: 2 MM/H
RHEUMATOID FACT SERPL-ACNC: <10 IU/ML
VIT B12 SERPL-MCNC: 692 PG/ML (ref 200–1100)

## 2025-03-31 ENCOUNTER — OFFICE VISIT (OUTPATIENT)
Dept: RHEUMATOLOGY | Facility: CLINIC | Age: 55
End: 2025-03-31
Payer: COMMERCIAL

## 2025-03-31 ENCOUNTER — HOSPITAL ENCOUNTER (OUTPATIENT)
Dept: RADIOLOGY | Facility: HOSPITAL | Age: 55
Discharge: HOME | End: 2025-03-31
Payer: COMMERCIAL

## 2025-03-31 VITALS
OXYGEN SATURATION: 98 % | SYSTOLIC BLOOD PRESSURE: 102 MMHG | DIASTOLIC BLOOD PRESSURE: 72 MMHG | HEIGHT: 65 IN | HEART RATE: 64 BPM | BODY MASS INDEX: 22.66 KG/M2 | WEIGHT: 136 LBS

## 2025-03-31 DIAGNOSIS — G89.29 CHRONIC MIDLINE BACK PAIN, UNSPECIFIED BACK LOCATION: ICD-10-CM

## 2025-03-31 DIAGNOSIS — M54.9 CHRONIC MIDLINE BACK PAIN, UNSPECIFIED BACK LOCATION: Primary | ICD-10-CM

## 2025-03-31 DIAGNOSIS — G89.29 CHRONIC MIDLINE BACK PAIN, UNSPECIFIED BACK LOCATION: Primary | ICD-10-CM

## 2025-03-31 DIAGNOSIS — M54.9 CHRONIC MIDLINE BACK PAIN, UNSPECIFIED BACK LOCATION: ICD-10-CM

## 2025-03-31 DIAGNOSIS — I87.2 VENOUS INSUFFICIENCY OF BOTH LOWER EXTREMITIES: ICD-10-CM

## 2025-03-31 PROCEDURE — 99204 OFFICE O/P NEW MOD 45 MIN: CPT | Performed by: STUDENT IN AN ORGANIZED HEALTH CARE EDUCATION/TRAINING PROGRAM

## 2025-03-31 PROCEDURE — 72040 X-RAY EXAM NECK SPINE 2-3 VW: CPT

## 2025-03-31 PROCEDURE — 72070 X-RAY EXAM THORAC SPINE 2VWS: CPT

## 2025-03-31 PROCEDURE — 72110 X-RAY EXAM L-2 SPINE 4/>VWS: CPT

## 2025-03-31 RX ORDER — SODIUM FLUORIDE1.1%, POTASSIUM NITRATE 5% 5.8; 57.5 MG/ML; MG/ML
GEL, DENTIFRICE DENTAL
COMMUNITY
Start: 2025-03-24

## 2025-03-31 RX ORDER — AZITHROMYCIN 250 MG/1
TABLET, FILM COATED ORAL
COMMUNITY
Start: 2025-03-01

## 2025-03-31 NOTE — ASSESSMENT & PLAN NOTE
Patient is a 54-year-old female presenting for further evaluation of chronic pain in her lower extremities, worse in the right calf.  Patient reports that the pain is constant and sometimes exacerbated with activity.  Patient reports that her calf sometimes feels swollen.  DVT was ruled out.  Otherwise, patient reports pain in her shoulders which comes and goes, however sometimes constant.  No prolonged morning stiffness.  Also reports tenderness to palpation of the back with chronic pain.  No obvious joint swelling.  No signs or symptoms of a connective tissue disease.  Workup shows negative LUIS ANTONIO, RF and normal inflammatory markers.  Overall, no objective evidence for a systemic autoimmune process.  Patient has full muscle strength on exam, low suspicion for myositis.  Discussed with patient that there could be a component of amplified pain syndrome.  -Discussed trying physical therapy or aqua therapy to help with amplified pain  -Can also consider discussing medication such as duloxetine with PCP if not contraindicated and feasible  -Continue to follow with PCP and vascular  Orders:    Ambulatory Referral to Rheumatology

## 2025-04-04 ENCOUNTER — RESULTS FOLLOW-UP (OUTPATIENT)
Age: 55
End: 2025-04-04

## 2025-04-10 NOTE — TELEPHONE ENCOUNTER
Faxed and sent to scanning.   HDL of 61 is protective but TW can Rx low dose statin if she wants--see TW if ??---?  VV

## 2025-05-14 ENCOUNTER — OFFICE VISIT (OUTPATIENT)
Dept: GASTROENTEROLOGY | Facility: CLINIC | Age: 55
End: 2025-05-14

## 2025-05-14 VITALS — SYSTOLIC BLOOD PRESSURE: 102 MMHG | DIASTOLIC BLOOD PRESSURE: 68 MMHG | TEMPERATURE: 97.6 F

## 2025-05-14 DIAGNOSIS — R13.10 DYSPHAGIA, UNSPECIFIED TYPE: Primary | ICD-10-CM

## 2025-05-14 DIAGNOSIS — Z86.0100 HISTORY OF COLON POLYPS: ICD-10-CM

## 2025-05-14 DIAGNOSIS — K22.70 BARRETT'S ESOPHAGUS WITHOUT DYSPLASIA: ICD-10-CM

## 2025-05-14 DIAGNOSIS — K59.03 DRUG-INDUCED CONSTIPATION: ICD-10-CM

## 2025-05-14 DIAGNOSIS — R10.32 LLQ PAIN: ICD-10-CM

## 2025-05-14 NOTE — PROGRESS NOTES
Name: Cindy Gilbert      : 1970      MRN: [id number]  Encounter Provider: Candace Baptiste DO  Encounter Date: 2025   Encounter department: [facility] [place]  :  Assessment & Plan  Dysphagia, unspecified type  Description consistent with either globus or mild esophageal dysphagia. Unremarkable EGD and no eosinophils on biopsies. Plan for barium to rule out other structural abnormalities; if worsens may consider manometry in the future.   Orders:  •  FL barium swallow; Future    Saavedra's esophagus without dysplasia  C3 Barretts. Due for repeat EGD in 5886-0450  Continue once daily PPI       LLQ pain  Improved in the setting of dietary changes/weight loss.        History of colon polyps  Due for repeat colonoscopy in        Drug-induced constipation  Likely Zepbound side effect. Well controlled with miralax and colace.       Follow up in 6 months    History of Present Illness   Cindy Gilbert is a 54 y.o. female who presents presenting for follow-up regarding history of erosive esophagitis, Saavedra's, GERD.      She has tolerated Zepbound very well and actually thinks it has helped her GI symptoms due to smaller meals. Her GERD is very controlled with daily PPI.   Her main complaint is mild intermittent dysphagia.  No history of food impactions or regurgitation of food.  This has been present for several years even prior to Zepbound.  She continues to move her bowels regularly and currently takes MiraLAX and Colace to help with this.     Summary of HPI from 2023 :  GI symptoms ongoing for prolonged period of time, recently worsened. She had a DVT leading to pulmonary embolism diagnosed in 2022 thought to be related to prolonged travel.  She was on anticoagulation but this has now been discontinued.  During her CT PE studies there was noted colonic diverticulosis as well as small hiatal hernia.   She does not move her bowels regularly,  although she does have some stool output roughly 5 days/week, she never feels like she is completely evacuating.  She denies blood in the stool.  No family history of colorectal cancer or other abnormalities in the colon.    She had negative Cologuard in 2022.  For follow-up EGD 12/26/2023 for assessment of healing of esophagitis.  Esophagitis had healed but she did have C3M3 segment of Saavedra's esophagus.  3 cm hiatal hernia noted.  Biopsies confirmed Saavedra's esophagus without dysplasia.  Initial EGD demonstrated grade C esophagitis, 4 cm hiatal hernia.  Gastric biopsies demonstrated focal intestinal metaplasia without dysplasia.  Colonoscopy revealed 1 subcentimeter polyp and left-sided diverticular disease.  HPI    Review of Systems A complete review of systems is negative other than that noted above in the HPI.      Current Medications[1]  Objective   /68 (BP Location: Right arm, Patient Position: Sitting, Cuff Size: Standard)   Temp 97.6 °F (36.4 °C) (Tympanic)     Physical Exam  Constitutional:       Appearance: Normal appearance.     Cardiovascular:      Rate and Rhythm: Normal rate.   Pulmonary:      Effort: Pulmonary effort is normal.   Abdominal:      General: Abdomen is flat. There is no distension.      Palpations: Abdomen is soft.      Tenderness: There is no abdominal tenderness.     Neurological:      General: No focal deficit present.      Mental Status: She is alert.     Psychiatric:         Mood and Affect: Mood normal.            Lab Results: I personally reviewed relevant lab results.       Results for orders placed during the hospital encounter of 12/26/23    EGD    Impression  C3 Saavedra's esophagus; performed cold forceps biopsy  3 cm type I hiatal hernia  Erythematous mucosa with erosion in the antrum; performed cold forceps biopsy  The duodenum appeared normal.      RECOMMENDATION:  Await pathology results    Change PPI to omeprazole 20 mg daily given side effects to Nexium; if  recurrence of GERD, increase to BID  Follow up in GI office for ongoing care        Candace Baptiste DO               [1]  Current Outpatient Medications   Medication Sig Dispense Refill   • ALPRAZolam (XANAX) 0.5 mg tablet Take 1 tablet (0.5 mg total) by mouth 4 (four) times a day as needed for anxiety 120 tablet 5   • aspirin (ECOTRIN LOW STRENGTH) 81 mg EC tablet Take 1 tablet (81 mg total) by mouth in the morning     • clobetasol (TEMOVATE) 0.05 % ointment if needed     • dicyclomine (BENTYL) 20 mg tablet take 1 tablet by mouth every 6 hours 60 tablet 2   • levothyroxine (Euthyrox) 75 mcg tablet Take 1 tablet (75 mcg total) by mouth daily 90 tablet 1   • metaxalone (SKELAXIN) 800 mg tablet Take 1 tablet (800 mg total) by mouth 3 (three) times a day prn 90 tablet 5   • omeprazole (PriLOSEC) 20 mg delayed release capsule take 1 capsule by mouth once daily 30 capsule 5   • Sodium Fluoride 5000 Sensitive 1.1-5 % GEL      • tirzepatide (Zepbound) 10 mg/0.5 mL auto-injector Inject 0.5 mL (10 mg total) under the skin once a week 6 mL 0   • tirzepatide (Zepbound) 10 mg/0.5 mL auto-injector Inject 0.5 mL (10 mg total) under the skin once a week 6 mL 0   • tirzepatide (Zepbound) 12.5 mg/0.5 mL auto-injector Inject 0.5 mL (12.5 mg total) under the skin once a week 6 mL 3   • azithromycin (ZITHROMAX) 250 mg tablet      • benzonatate (TESSALON) 200 MG capsule Take 1 capsule (200 mg total) by mouth 3 (three) times a day as needed for cough 50 capsule 1     No current facility-administered medications for this visit.

## 2025-05-16 NOTE — TELEPHONE ENCOUNTER
Left message on [facility] number [phone] to discuss requirements for this patient.   
Left message to discuss requirements for patients currently on weight loss medications and the need to remain on maintenance doses.  Direct  number given for [facility] to call back.   
Spoke with Max at [facility].  Per Max, guidelines set forth for patients on weight loss medications were determined by [employer] and their physicians; these guidelines are what [facility] is to follow for a patient who is taking/would like to take a weight loss prescription.  Inquired to Max who we/the patient would need to speak to regarding BMI requirements as patient has been successful with this medication and additional lifestyle changes.  The recommendation is for us to call the [employer] line and speak with a provider about an exception for the patient.  Number given was [phone].  
intact

## 2025-05-27 ENCOUNTER — HOSPITAL ENCOUNTER (OUTPATIENT)
Dept: RADIOLOGY | Facility: HOSPITAL | Age: 55
Discharge: HOME | End: 2025-05-27
Attending: INTERNAL MEDICINE
Payer: COMMERCIAL

## 2025-05-27 DIAGNOSIS — R13.10 DYSPHAGIA, UNSPECIFIED TYPE: ICD-10-CM

## 2025-05-27 PROCEDURE — 74220 X-RAY XM ESOPHAGUS 1CNTRST: CPT

## 2025-05-28 ENCOUNTER — RESULTS FOLLOW-UP (OUTPATIENT)
Age: 55
End: 2025-05-28

## 2025-07-07 DIAGNOSIS — E03.9 HYPOTHYROIDISM, UNSPECIFIED TYPE: ICD-10-CM

## 2025-07-09 DIAGNOSIS — E03.9 HYPOTHYROIDISM, UNSPECIFIED TYPE: ICD-10-CM

## 2025-07-10 RX ORDER — LEVOTHYROXINE SODIUM 75 UG/1
75 TABLET ORAL DAILY
Qty: 90 TABLET | Refills: 0 | Status: SHIPPED | OUTPATIENT
Start: 2025-07-10

## 2025-07-10 RX ORDER — LEVOTHYROXINE SODIUM 75 UG/1
75 TABLET ORAL DAILY
Qty: 90 TABLET | Refills: 3 | OUTPATIENT
Start: 2025-07-10

## 2025-07-21 ENCOUNTER — OFFICE VISIT (OUTPATIENT)
Dept: FAMILY MEDICINE CLINIC | Facility: CLINIC | Age: 55
End: 2025-07-21
Payer: COMMERCIAL

## 2025-07-21 VITALS
OXYGEN SATURATION: 99 % | BODY MASS INDEX: 21.97 KG/M2 | DIASTOLIC BLOOD PRESSURE: 68 MMHG | HEART RATE: 67 BPM | SYSTOLIC BLOOD PRESSURE: 108 MMHG | WEIGHT: 130 LBS

## 2025-07-21 DIAGNOSIS — L03.032 CELLULITIS OF TOE OF LEFT FOOT: Primary | ICD-10-CM

## 2025-07-21 PROCEDURE — 99213 OFFICE O/P EST LOW 20 MIN: CPT | Performed by: FAMILY MEDICINE

## 2025-07-21 RX ORDER — CEPHALEXIN 500 MG/1
CAPSULE ORAL
Qty: 21 CAPSULE | Refills: 0 | Status: SHIPPED | OUTPATIENT
Start: 2025-07-21 | End: 2025-07-21 | Stop reason: SDUPTHER

## 2025-07-21 RX ORDER — CEPHALEXIN 500 MG/1
CAPSULE ORAL
Qty: 21 CAPSULE | Refills: 0 | Status: SHIPPED | OUTPATIENT
Start: 2025-07-21 | End: 2025-07-28

## 2025-07-21 RX ORDER — FLUCONAZOLE 150 MG/1
150 TABLET ORAL ONCE
Qty: 2 TABLET | Refills: 3 | Status: SHIPPED | OUTPATIENT
Start: 2025-07-21 | End: 2025-07-21

## 2025-07-21 NOTE — PROGRESS NOTES
Name: Cindy Gilbert      : 1970      MRN: [id number]  Encounter Provider: Dandre Flores MD  Encounter Date: 2025   Encounter department: [facility]  :  Assessment & Plan  Cellulitis of toe of left foot    Orders:    cephalexin (KEFLEX) 500 mg capsule; 1 TID for 1 wk    fluconazole (DIFLUCAN) 150 mg tablet; Take 1 tablet (150 mg total) by mouth once for 1 dose           History of Present Illness   HPI  Review of Systems   Constitutional:  Negative for fatigue, fever and unexpected weight change.   HENT:  Negative for congestion, sinus pain and sore throat.    Eyes:  Negative for visual disturbance.   Respiratory:  Negative for shortness of breath and wheezing.    Cardiovascular:  Negative for chest pain and palpitations.   Gastrointestinal:  Negative for abdominal pain, nausea and vomiting.   Musculoskeletal: Negative.  Negative for arthralgias and myalgias.   Neurological:  Negative for syncope, weakness and numbness.   Psychiatric/Behavioral: Negative.  Negative for confusion, dysphoric mood and suicidal ideas.        Objective   /68 (BP Location: Left arm, Patient Position: Sitting, Cuff Size: Standard)   Pulse 67   Wt 59 kg (130 lb)   SpO2 99%   BMI 21.97 kg/m²      Physical Exam  Vitals and nursing note reviewed.   Constitutional:       General: She is not in acute distress.     Appearance: She is well-developed.   HENT:      Head: Normocephalic and atraumatic.     Eyes:      Conjunctiva/sclera: Conjunctivae normal.       Cardiovascular:      Rate and Rhythm: Normal rate and regular rhythm.      Heart sounds: No murmur heard.  Pulmonary:      Effort: Pulmonary effort is normal. No respiratory distress.      Breath sounds: Normal breath sounds.   Abdominal:      Palpations: Abdomen is soft.      Tenderness: There is no abdominal tenderness.     Musculoskeletal:         General: No swelling.      Cervical back: Neck supple.     Skin:     General: Skin  is warm and dry.      Capillary Refill: Capillary refill takes less than 2 seconds.     Neurological:      Mental Status: She is alert.     Psychiatric:         Mood and Affect: Mood normal.

## 2025-07-30 DIAGNOSIS — E03.9 HYPOTHYROIDISM, UNSPECIFIED TYPE: Primary | ICD-10-CM

## 2025-07-30 DIAGNOSIS — E83.51 HYPOCALCEMIA: ICD-10-CM

## 2025-07-30 DIAGNOSIS — N95.1 PERIMENOPAUSAL: ICD-10-CM

## 2025-07-30 DIAGNOSIS — R53.83 FATIGUE, UNSPECIFIED TYPE: ICD-10-CM

## 2025-07-30 DIAGNOSIS — D64.9 ANEMIA, UNSPECIFIED TYPE: ICD-10-CM

## 2025-07-30 DIAGNOSIS — F51.01 PRIMARY INSOMNIA: ICD-10-CM

## 2025-07-30 DIAGNOSIS — E55.9 VITAMIN D DEFICIENCY: ICD-10-CM

## 2025-07-30 DIAGNOSIS — E53.8 B12 DEFICIENCY: ICD-10-CM

## 2025-08-02 LAB
25(OH)D3 SERPL-MCNC: 61 NG/ML (ref 30–100)
ALBUMIN SERPL-MCNC: 4.1 G/DL (ref 3.6–5.1)
ALBUMIN/GLOB SERPL: 2 (CALC) (ref 1–2.5)
ALP SERPL-CCNC: 47 U/L (ref 37–153)
ALT SERPL-CCNC: 45 U/L (ref 6–29)
AST SERPL-CCNC: 30 U/L (ref 10–35)
BASOPHILS # BLD AUTO: 80 CELLS/UL (ref 0–200)
BASOPHILS NFR BLD AUTO: 2 %
BILIRUB SERPL-MCNC: 0.5 MG/DL (ref 0.2–1.2)
BUN SERPL-MCNC: 15 MG/DL (ref 7–25)
BUN/CREAT SERPL: ABNORMAL (CALC) (ref 6–22)
CALCIUM SERPL-MCNC: 8.9 MG/DL (ref 8.6–10.4)
CHLORIDE SERPL-SCNC: 107 MMOL/L (ref 98–110)
CO2 SERPL-SCNC: 30 MMOL/L (ref 20–32)
CREAT SERPL-MCNC: 0.57 MG/DL (ref 0.5–1.03)
EOSINOPHIL # BLD AUTO: 508 CELLS/UL (ref 15–500)
EOSINOPHIL NFR BLD AUTO: 12.7 %
ERYTHROCYTE [DISTWIDTH] IN BLOOD BY AUTOMATED COUNT: 13.1 % (ref 11–15)
ESTRADIOL SERPL-MCNC: <15 PG/ML
FSH SERPL-ACNC: 96.3 MIU/ML
GFR/BSA.PRED SERPLBLD CYS-BASED-ARV: 109 ML/MIN/1.73M2
GLOBULIN SER CALC-MCNC: 2.1 G/DL (CALC) (ref 1.9–3.7)
GLUCOSE SERPL-MCNC: 81 MG/DL (ref 65–99)
HCT VFR BLD AUTO: 42.2 % (ref 35–45)
HGB BLD-MCNC: 13.9 G/DL (ref 11.7–15.5)
LYMPHOCYTES # BLD AUTO: 1116 CELLS/UL (ref 850–3900)
LYMPHOCYTES NFR BLD AUTO: 27.9 %
MCH RBC QN AUTO: 31.3 PG (ref 27–33)
MCHC RBC AUTO-ENTMCNC: 32.9 G/DL (ref 32–36)
MCV RBC AUTO: 95 FL (ref 80–100)
MONOCYTES # BLD AUTO: 400 CELLS/UL (ref 200–950)
MONOCYTES NFR BLD AUTO: 10 %
NEUTROPHILS # BLD AUTO: 1896 CELLS/UL (ref 1500–7800)
NEUTROPHILS NFR BLD AUTO: 47.4 %
PLATELET # BLD AUTO: 219 THOUSAND/UL (ref 140–400)
PMV BLD REES-ECKER: 10.1 FL (ref 7.5–12.5)
POTASSIUM SERPL-SCNC: 4.1 MMOL/L (ref 3.5–5.3)
PROT SERPL-MCNC: 6.2 G/DL (ref 6.1–8.1)
RBC # BLD AUTO: 4.44 MILLION/UL (ref 3.8–5.1)
SODIUM SERPL-SCNC: 142 MMOL/L (ref 135–146)
T3FREE SERPL-MCNC: 3.2 PG/ML (ref 2.3–4.2)
T4 FREE SERPL-MCNC: 1.6 NG/DL (ref 0.8–1.8)
TSH SERPL-ACNC: 1.65 MIU/L
VIT B12 SERPL-MCNC: 738 PG/ML (ref 200–1100)
WBC # BLD AUTO: 4 THOUSAND/UL (ref 3.8–10.8)

## 2025-08-04 DIAGNOSIS — E66.813 CLASS 3 OBESITY: ICD-10-CM

## 2025-08-05 RX ORDER — TIRZEPATIDE 10 MG/.5ML
3 INJECTION, SOLUTION SUBCUTANEOUS WEEKLY
Qty: 6 ML | Refills: 3 | Status: SHIPPED | OUTPATIENT
Start: 2025-08-05

## 2025-08-15 ENCOUNTER — OFFICE VISIT (OUTPATIENT)
Dept: FAMILY MEDICINE CLINIC | Facility: CLINIC | Age: 55
End: 2025-08-15
Payer: COMMERCIAL

## 2025-08-15 VITALS
WEIGHT: 127 LBS | HEIGHT: 65 IN | OXYGEN SATURATION: 98 % | SYSTOLIC BLOOD PRESSURE: 100 MMHG | BODY MASS INDEX: 21.16 KG/M2 | DIASTOLIC BLOOD PRESSURE: 62 MMHG | HEART RATE: 82 BPM | TEMPERATURE: 98.9 F

## 2025-08-15 DIAGNOSIS — N95.1 PERIMENOPAUSE: Primary | ICD-10-CM

## 2025-08-15 DIAGNOSIS — Z51.81 ENCOUNTER FOR MONITORING LONG-TERM PROTON PUMP INHIBITOR THERAPY: ICD-10-CM

## 2025-08-15 DIAGNOSIS — E03.9 HYPOTHYROIDISM, UNSPECIFIED TYPE: ICD-10-CM

## 2025-08-15 DIAGNOSIS — I26.99 PULMONARY EMBOLISM WITHOUT ACUTE COR PULMONALE, UNSPECIFIED CHRONICITY, UNSPECIFIED PULMONARY EMBOLISM TYPE (HCC): ICD-10-CM

## 2025-08-15 DIAGNOSIS — E28.39 PRIMARY OVARIAN FAILURE: ICD-10-CM

## 2025-08-15 DIAGNOSIS — Z79.899 ENCOUNTER FOR MONITORING LONG-TERM PROTON PUMP INHIBITOR THERAPY: ICD-10-CM

## 2025-08-15 PROCEDURE — 99214 OFFICE O/P EST MOD 30 MIN: CPT

## 2025-08-18 ENCOUNTER — HOSPITAL ENCOUNTER (OUTPATIENT)
Facility: HOSPITAL | Age: 55
Discharge: HOME | End: 2025-08-18
Payer: COMMERCIAL

## 2025-08-18 VITALS — HEIGHT: 65 IN | WEIGHT: 128 LBS | BODY MASS INDEX: 21.33 KG/M2

## 2025-08-18 DIAGNOSIS — Z79.899 ENCOUNTER FOR MONITORING LONG-TERM PROTON PUMP INHIBITOR THERAPY: ICD-10-CM

## 2025-08-18 DIAGNOSIS — Z51.81 ENCOUNTER FOR MONITORING LONG-TERM PROTON PUMP INHIBITOR THERAPY: ICD-10-CM

## 2025-08-18 DIAGNOSIS — E28.39 PRIMARY OVARIAN FAILURE: ICD-10-CM

## 2025-08-18 PROCEDURE — 77080 DXA BONE DENSITY AXIAL: CPT

## 2025-08-21 ENCOUNTER — OFFICE VISIT (OUTPATIENT)
Dept: OBGYN CLINIC | Facility: CLINIC | Age: 55
End: 2025-08-21
Payer: COMMERCIAL

## 2025-08-21 VITALS
DIASTOLIC BLOOD PRESSURE: 58 MMHG | BODY MASS INDEX: 21.96 KG/M2 | SYSTOLIC BLOOD PRESSURE: 94 MMHG | HEIGHT: 65 IN | WEIGHT: 131.8 LBS

## 2025-08-21 DIAGNOSIS — Z78.0 MENOPAUSE: Primary | ICD-10-CM

## 2025-08-21 PROCEDURE — 99203 OFFICE O/P NEW LOW 30 MIN: CPT | Performed by: OBSTETRICS & GYNECOLOGY

## 2025-08-24 LAB
ALBUMIN SERPL-MCNC: 4.1 G/DL (ref 3.6–5.1)
ALBUMIN/GLOB SERPL: 2 (CALC) (ref 1–2.5)
ALP SERPL-CCNC: 58 U/L (ref 37–153)
ALT SERPL-CCNC: 58 U/L (ref 6–29)
AST SERPL-CCNC: 29 U/L (ref 10–35)
BILIRUB SERPL-MCNC: 0.5 MG/DL (ref 0.2–1.2)
BUN SERPL-MCNC: 17 MG/DL (ref 7–25)
BUN/CREAT SERPL: ABNORMAL (CALC) (ref 6–22)
CALCIUM SERPL-MCNC: 9.3 MG/DL (ref 8.6–10.4)
CHLORIDE SERPL-SCNC: 106 MMOL/L (ref 98–110)
CO2 SERPL-SCNC: 29 MMOL/L (ref 20–32)
CREAT SERPL-MCNC: 0.59 MG/DL (ref 0.5–1.03)
GFR/BSA.PRED SERPLBLD CYS-BASED-ARV: 108 ML/MIN/1.73M2
GLOBULIN SER CALC-MCNC: 2.1 G/DL (CALC) (ref 1.9–3.7)
GLUCOSE SERPL-MCNC: 77 MG/DL (ref 65–99)
POTASSIUM SERPL-SCNC: 4 MMOL/L (ref 3.5–5.3)
PROT SERPL-MCNC: 6.2 G/DL (ref 6.1–8.1)
SODIUM SERPL-SCNC: 142 MMOL/L (ref 135–146)